# Patient Record
Sex: MALE | Race: WHITE | NOT HISPANIC OR LATINO | Employment: OTHER | URBAN - METROPOLITAN AREA
[De-identification: names, ages, dates, MRNs, and addresses within clinical notes are randomized per-mention and may not be internally consistent; named-entity substitution may affect disease eponyms.]

---

## 2017-01-05 ENCOUNTER — COMMUNICATION - HEALTHEAST (OUTPATIENT)
Dept: INTERNAL MEDICINE | Facility: CLINIC | Age: 77
End: 2017-01-05

## 2017-01-27 ENCOUNTER — RECORDS - HEALTHEAST (OUTPATIENT)
Dept: ADMINISTRATIVE | Facility: OTHER | Age: 77
End: 2017-01-27

## 2017-04-18 ENCOUNTER — COMMUNICATION - HEALTHEAST (OUTPATIENT)
Dept: INTERNAL MEDICINE | Facility: CLINIC | Age: 77
End: 2017-04-18

## 2017-04-25 ENCOUNTER — OFFICE VISIT - HEALTHEAST (OUTPATIENT)
Dept: INTERNAL MEDICINE | Facility: CLINIC | Age: 77
End: 2017-04-25

## 2017-04-25 DIAGNOSIS — B19.10 HEPATITIS B: ICD-10-CM

## 2017-04-25 DIAGNOSIS — J20.9 ACUTE BRONCHITIS: ICD-10-CM

## 2017-04-25 LAB — HBV SURFACE AG SERPL QL IA: NEGATIVE

## 2017-04-25 ASSESSMENT — MIFFLIN-ST. JEOR: SCORE: 1635.25

## 2017-04-26 LAB — HBV CORE AB SERPL QL IA: POSITIVE

## 2017-05-05 ENCOUNTER — RECORDS - HEALTHEAST (OUTPATIENT)
Dept: ADMINISTRATIVE | Facility: OTHER | Age: 77
End: 2017-05-05

## 2017-05-15 ENCOUNTER — RECORDS - HEALTHEAST (OUTPATIENT)
Dept: ADMINISTRATIVE | Facility: OTHER | Age: 77
End: 2017-05-15

## 2017-06-05 ENCOUNTER — RECORDS - HEALTHEAST (OUTPATIENT)
Dept: ADMINISTRATIVE | Facility: OTHER | Age: 77
End: 2017-06-05

## 2017-06-07 ENCOUNTER — OFFICE VISIT - HEALTHEAST (OUTPATIENT)
Dept: INTERNAL MEDICINE | Facility: CLINIC | Age: 77
End: 2017-06-07

## 2017-06-07 ENCOUNTER — AMBULATORY - HEALTHEAST (OUTPATIENT)
Dept: INTERNAL MEDICINE | Facility: CLINIC | Age: 77
End: 2017-06-07
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
## 2017-06-07 ASSESSMENT — MIFFLIN-ST. JEOR: SCORE: 1608.04

## 2017-06-12 ENCOUNTER — COMMUNICATION - HEALTHEAST (OUTPATIENT)
Dept: INTERNAL MEDICINE | Facility: CLINIC | Age: 77
End: 2017-06-12

## 2017-06-21 ENCOUNTER — AMBULATORY - HEALTHEAST (OUTPATIENT)
Dept: LAB | Facility: CLINIC | Age: 77
End: 2017-06-21
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
## 2017-06-22 ENCOUNTER — COMMUNICATION - HEALTHEAST (OUTPATIENT)
Dept: INTERNAL MEDICINE | Facility: CLINIC | Age: 77
End: 2017-06-22

## 2017-06-26 ENCOUNTER — RECORDS - HEALTHEAST (OUTPATIENT)
Dept: ADMINISTRATIVE | Facility: OTHER | Age: 77
End: 2017-06-26

## 2017-07-10 ENCOUNTER — COMMUNICATION - HEALTHEAST (OUTPATIENT)
Dept: INTERNAL MEDICINE | Facility: CLINIC | Age: 77
End: 2017-07-10

## 2017-07-17 ENCOUNTER — RECORDS - HEALTHEAST (OUTPATIENT)
Dept: ADMINISTRATIVE | Facility: OTHER | Age: 77
End: 2017-07-17

## 2017-07-29 ENCOUNTER — RECORDS - HEALTHEAST (OUTPATIENT)
Dept: ADMINISTRATIVE | Facility: OTHER | Age: 77
End: 2017-07-29

## 2017-10-02 ENCOUNTER — RECORDS - HEALTHEAST (OUTPATIENT)
Dept: ADMINISTRATIVE | Facility: OTHER | Age: 77
End: 2017-10-02

## 2017-10-03 ENCOUNTER — RECORDS - HEALTHEAST (OUTPATIENT)
Dept: ADMINISTRATIVE | Facility: OTHER | Age: 77
End: 2017-10-03

## 2017-10-25 ENCOUNTER — COMMUNICATION - HEALTHEAST (OUTPATIENT)
Dept: INTERNAL MEDICINE | Facility: CLINIC | Age: 77
End: 2017-10-25

## 2017-10-25 DIAGNOSIS — E78.2 MIXED HYPERLIPIDEMIA: ICD-10-CM

## 2017-11-07 ENCOUNTER — COMMUNICATION - HEALTHEAST (OUTPATIENT)
Dept: INTERNAL MEDICINE | Facility: CLINIC | Age: 77
End: 2017-11-07

## 2017-11-07 ENCOUNTER — RECORDS - HEALTHEAST (OUTPATIENT)
Dept: ADMINISTRATIVE | Facility: OTHER | Age: 77
End: 2017-11-07

## 2017-11-08 ENCOUNTER — OFFICE VISIT - HEALTHEAST (OUTPATIENT)
Dept: INTERNAL MEDICINE | Facility: CLINIC | Age: 77
End: 2017-11-08

## 2017-11-08 DIAGNOSIS — I10 ESSENTIAL HYPERTENSION: ICD-10-CM

## 2017-11-08 ASSESSMENT — MIFFLIN-ST. JEOR: SCORE: 1608.04

## 2017-11-13 ENCOUNTER — OFFICE VISIT - HEALTHEAST (OUTPATIENT)
Dept: INTERNAL MEDICINE | Facility: CLINIC | Age: 77
End: 2017-11-13

## 2017-11-13 DIAGNOSIS — I10 ESSENTIAL HYPERTENSION: ICD-10-CM

## 2017-11-13 ASSESSMENT — MIFFLIN-ST. JEOR
SCORE: 1603.5
SCORE: 1603.5

## 2017-12-13 ENCOUNTER — OFFICE VISIT - HEALTHEAST (OUTPATIENT)
Dept: INTERNAL MEDICINE | Facility: CLINIC | Age: 77
End: 2017-12-13

## 2017-12-13 DIAGNOSIS — E78.2 MIXED HYPERLIPIDEMIA: ICD-10-CM

## 2017-12-13 DIAGNOSIS — I10 ESSENTIAL HYPERTENSION: ICD-10-CM

## 2017-12-13 ASSESSMENT — MIFFLIN-ST. JEOR: SCORE: 1621.65

## 2017-12-20 ENCOUNTER — RECORDS - HEALTHEAST (OUTPATIENT)
Dept: ADMINISTRATIVE | Facility: OTHER | Age: 77
End: 2017-12-20

## 2017-12-23 ENCOUNTER — COMMUNICATION - HEALTHEAST (OUTPATIENT)
Dept: INTERNAL MEDICINE | Facility: CLINIC | Age: 77
End: 2017-12-23

## 2017-12-23 DIAGNOSIS — K21.9 GERD (GASTROESOPHAGEAL REFLUX DISEASE): ICD-10-CM

## 2018-01-03 ENCOUNTER — OFFICE VISIT - HEALTHEAST (OUTPATIENT)
Dept: INTERNAL MEDICINE | Facility: CLINIC | Age: 78
End: 2018-01-03

## 2018-01-03 DIAGNOSIS — E78.2 MIXED HYPERLIPIDEMIA: ICD-10-CM

## 2018-01-03 DIAGNOSIS — I10 ESSENTIAL HYPERTENSION: ICD-10-CM

## 2018-01-03 ASSESSMENT — MIFFLIN-ST. JEOR: SCORE: 1617.11

## 2018-01-15 ENCOUNTER — COMMUNICATION - HEALTHEAST (OUTPATIENT)
Dept: INTERNAL MEDICINE | Facility: CLINIC | Age: 78
End: 2018-01-15

## 2018-01-15 ENCOUNTER — AMBULATORY - HEALTHEAST (OUTPATIENT)
Dept: INTERNAL MEDICINE | Facility: CLINIC | Age: 78
End: 2018-01-15

## 2018-01-15 ENCOUNTER — OFFICE VISIT - HEALTHEAST (OUTPATIENT)
Dept: INTERNAL MEDICINE | Facility: CLINIC | Age: 78
End: 2018-01-15

## 2018-01-15 DIAGNOSIS — R63.5 WEIGHT GAIN: ICD-10-CM

## 2018-01-15 DIAGNOSIS — E78.2 MIXED HYPERLIPIDEMIA: ICD-10-CM

## 2018-01-15 DIAGNOSIS — I10 ESSENTIAL HYPERTENSION: ICD-10-CM

## 2018-01-15 DIAGNOSIS — G31.84 MILD COGNITIVE IMPAIRMENT: ICD-10-CM

## 2018-01-15 DIAGNOSIS — Z12.5 PROSTATE CANCER SCREENING: ICD-10-CM

## 2018-01-15 DIAGNOSIS — Z00.00 ROUTINE GENERAL MEDICAL EXAMINATION AT A HEALTH CARE FACILITY: ICD-10-CM

## 2018-01-15 DIAGNOSIS — C76.0 HEAD AND NECK CANCER (H): ICD-10-CM

## 2018-01-15 DIAGNOSIS — Z79.899 MEDICATION MANAGEMENT: ICD-10-CM

## 2018-01-15 DIAGNOSIS — D72.819 LEUKOPENIA: ICD-10-CM

## 2018-01-15 LAB
ALBUMIN SERPL-MCNC: 3.9 G/DL (ref 3.5–5)
ALBUMIN UR-MCNC: NEGATIVE MG/DL
ALP SERPL-CCNC: 67 U/L (ref 45–120)
ALT SERPL W P-5'-P-CCNC: 29 U/L (ref 0–45)
ANION GAP SERPL CALCULATED.3IONS-SCNC: 12 MMOL/L (ref 5–18)
APPEARANCE UR: CLEAR
AST SERPL W P-5'-P-CCNC: 22 U/L (ref 0–40)
ATRIAL RATE - MUSE: 72 BPM
BASOPHILS # BLD AUTO: 0 THOU/UL (ref 0–0.2)
BASOPHILS NFR BLD AUTO: 1 % (ref 0–2)
BILIRUB SERPL-MCNC: 0.7 MG/DL (ref 0–1)
BILIRUB UR QL STRIP: NEGATIVE
BUN SERPL-MCNC: 15 MG/DL (ref 8–28)
CALCIUM SERPL-MCNC: 9.4 MG/DL (ref 8.5–10.5)
CHLORIDE BLD-SCNC: 107 MMOL/L (ref 98–107)
CHOLEST SERPL-MCNC: 181 MG/DL
CO2 SERPL-SCNC: 24 MMOL/L (ref 22–31)
COLOR UR AUTO: YELLOW
CREAT SERPL-MCNC: 1.01 MG/DL (ref 0.7–1.3)
DIASTOLIC BLOOD PRESSURE - MUSE: NORMAL MMHG
EOSINOPHIL # BLD AUTO: 0.1 THOU/UL (ref 0–0.4)
EOSINOPHIL NFR BLD AUTO: 2 % (ref 0–6)
ERYTHROCYTE [DISTWIDTH] IN BLOOD BY AUTOMATED COUNT: 13.4 % (ref 11–14.5)
FASTING STATUS PATIENT QL REPORTED: YES
GFR SERPL CREATININE-BSD FRML MDRD: >60 ML/MIN/1.73M2
GLUCOSE BLD-MCNC: 99 MG/DL (ref 70–125)
GLUCOSE UR STRIP-MCNC: NEGATIVE MG/DL
HCT VFR BLD AUTO: 48.7 % (ref 40–54)
HDLC SERPL-MCNC: 53 MG/DL
HGB BLD-MCNC: 15.7 G/DL (ref 14–18)
HGB UR QL STRIP: NEGATIVE
INTERPRETATION ECG - MUSE: NORMAL
KETONES UR STRIP-MCNC: NEGATIVE MG/DL
LDLC SERPL CALC-MCNC: 96 MG/DL
LEUKOCYTE ESTERASE UR QL STRIP: NEGATIVE
LYMPHOCYTES # BLD AUTO: 0.5 THOU/UL (ref 0.8–4.4)
LYMPHOCYTES NFR BLD AUTO: 16 % (ref 20–40)
MCH RBC QN AUTO: 31.1 PG (ref 27–34)
MCHC RBC AUTO-ENTMCNC: 32.2 G/DL (ref 32–36)
MCV RBC AUTO: 96 FL (ref 80–100)
MONOCYTES # BLD AUTO: 0.3 THOU/UL (ref 0–0.9)
MONOCYTES NFR BLD AUTO: 10 % (ref 2–10)
NEUTROPHILS # BLD AUTO: 2.3 THOU/UL (ref 2–7.7)
NEUTROPHILS NFR BLD AUTO: 70 % (ref 50–70)
NITRATE UR QL: NEGATIVE
P AXIS - MUSE: 33 DEGREES
PH UR STRIP: 5.5 [PH] (ref 5–8)
PLATELET # BLD AUTO: 130 THOU/UL (ref 140–440)
PMV BLD AUTO: 11.2 FL (ref 8.5–12.5)
POTASSIUM BLD-SCNC: 4.3 MMOL/L (ref 3.5–5)
PR INTERVAL - MUSE: 206 MS
PROT SERPL-MCNC: 7 G/DL (ref 6–8)
PSA SERPL-MCNC: 1.4 NG/ML (ref 0–6.5)
QRS DURATION - MUSE: 84 MS
QT - MUSE: 386 MS
QTC - MUSE: 422 MS
R AXIS - MUSE: -25 DEGREES
RBC # BLD AUTO: 5.05 MILL/UL (ref 4.4–6.2)
SODIUM SERPL-SCNC: 143 MMOL/L (ref 136–145)
SP GR UR STRIP: 1.02 (ref 1–1.03)
SYSTOLIC BLOOD PRESSURE - MUSE: NORMAL MMHG
T AXIS - MUSE: 14 DEGREES
T4 FREE SERPL-MCNC: 0.7 NG/DL (ref 0.7–1.8)
TRIGL SERPL-MCNC: 160 MG/DL
TSH SERPL DL<=0.005 MIU/L-ACNC: 9.24 UIU/ML (ref 0.3–5)
UROBILINOGEN UR STRIP-ACNC: NORMAL
VENTRICULAR RATE- MUSE: 72 BPM
WBC: 3.3 THOU/UL (ref 4–11)

## 2018-01-15 ASSESSMENT — MIFFLIN-ST. JEOR: SCORE: 1612.57

## 2018-01-22 ENCOUNTER — RECORDS - HEALTHEAST (OUTPATIENT)
Dept: ADMINISTRATIVE | Facility: OTHER | Age: 78
End: 2018-01-22

## 2018-04-11 ENCOUNTER — COMMUNICATION - HEALTHEAST (OUTPATIENT)
Dept: INTERNAL MEDICINE | Facility: CLINIC | Age: 78
End: 2018-04-11

## 2018-04-11 ENCOUNTER — OFFICE VISIT - HEALTHEAST (OUTPATIENT)
Dept: INTERNAL MEDICINE | Facility: CLINIC | Age: 78
End: 2018-04-11

## 2018-04-11 DIAGNOSIS — D72.819 LEUKOPENIA, UNSPECIFIED TYPE: ICD-10-CM

## 2018-04-11 DIAGNOSIS — L72.0 EPIDERMAL CYST: ICD-10-CM

## 2018-04-11 DIAGNOSIS — I10 ESSENTIAL HYPERTENSION: ICD-10-CM

## 2018-04-11 DIAGNOSIS — E03.4 HYPOTHYROIDISM DUE TO ACQUIRED ATROPHY OF THYROID: ICD-10-CM

## 2018-04-11 LAB
BASOPHILS # BLD AUTO: 0 THOU/UL (ref 0–0.2)
BASOPHILS NFR BLD AUTO: 1 % (ref 0–2)
EOSINOPHIL # BLD AUTO: 0.1 THOU/UL (ref 0–0.4)
EOSINOPHIL NFR BLD AUTO: 3 % (ref 0–6)
ERYTHROCYTE [DISTWIDTH] IN BLOOD BY AUTOMATED COUNT: 14.2 % (ref 11–14.5)
HCT VFR BLD AUTO: 45.1 % (ref 40–54)
HGB BLD-MCNC: 15 G/DL (ref 14–18)
LYMPHOCYTES # BLD AUTO: 0.7 THOU/UL (ref 0.8–4.4)
LYMPHOCYTES NFR BLD AUTO: 22 % (ref 20–40)
MCH RBC QN AUTO: 32 PG (ref 27–34)
MCHC RBC AUTO-ENTMCNC: 33.3 G/DL (ref 32–36)
MCV RBC AUTO: 96 FL (ref 80–100)
MONOCYTES # BLD AUTO: 0.3 THOU/UL (ref 0–0.9)
MONOCYTES NFR BLD AUTO: 9 % (ref 2–10)
NEUTROPHILS # BLD AUTO: 2.2 THOU/UL (ref 2–7.7)
NEUTROPHILS NFR BLD AUTO: 66 % (ref 50–70)
PLATELET # BLD AUTO: 132 THOU/UL (ref 140–440)
PMV BLD AUTO: 11.6 FL (ref 8.5–12.5)
RBC # BLD AUTO: 4.69 MILL/UL (ref 4.4–6.2)
TSH SERPL DL<=0.005 MIU/L-ACNC: 3.12 UIU/ML (ref 0.3–5)
WBC: 3.3 THOU/UL (ref 4–11)

## 2018-04-11 ASSESSMENT — MIFFLIN-ST. JEOR: SCORE: 1617.11

## 2018-05-02 ENCOUNTER — RECORDS - HEALTHEAST (OUTPATIENT)
Dept: ADMINISTRATIVE | Facility: OTHER | Age: 78
End: 2018-05-02

## 2018-05-08 ENCOUNTER — RECORDS - HEALTHEAST (OUTPATIENT)
Dept: ADMINISTRATIVE | Facility: OTHER | Age: 78
End: 2018-05-08

## 2018-05-23 ENCOUNTER — RECORDS - HEALTHEAST (OUTPATIENT)
Dept: ADMINISTRATIVE | Facility: OTHER | Age: 78
End: 2018-05-23

## 2018-07-08 ENCOUNTER — COMMUNICATION - HEALTHEAST (OUTPATIENT)
Dept: INTERNAL MEDICINE | Facility: CLINIC | Age: 78
End: 2018-07-08

## 2018-07-08 DIAGNOSIS — E03.4 HYPOTHYROIDISM DUE TO ACQUIRED ATROPHY OF THYROID: ICD-10-CM

## 2018-08-07 ENCOUNTER — RECORDS - HEALTHEAST (OUTPATIENT)
Dept: ADMINISTRATIVE | Facility: OTHER | Age: 78
End: 2018-08-07

## 2018-08-08 ENCOUNTER — RECORDS - HEALTHEAST (OUTPATIENT)
Dept: ADMINISTRATIVE | Facility: OTHER | Age: 78
End: 2018-08-08

## 2018-08-15 ENCOUNTER — OFFICE VISIT - HEALTHEAST (OUTPATIENT)
Dept: INTERNAL MEDICINE | Facility: CLINIC | Age: 78
End: 2018-08-15

## 2018-08-15 ENCOUNTER — COMMUNICATION - HEALTHEAST (OUTPATIENT)
Dept: INTERNAL MEDICINE | Facility: CLINIC | Age: 78
End: 2018-08-15

## 2018-08-15 DIAGNOSIS — E78.2 MIXED HYPERLIPIDEMIA: ICD-10-CM

## 2018-08-15 DIAGNOSIS — I10 ESSENTIAL HYPERTENSION: ICD-10-CM

## 2018-08-15 DIAGNOSIS — E03.4 HYPOTHYROIDISM DUE TO ACQUIRED ATROPHY OF THYROID: ICD-10-CM

## 2018-08-15 LAB — TSH SERPL DL<=0.005 MIU/L-ACNC: 3.43 UIU/ML (ref 0.3–5)

## 2018-08-15 ASSESSMENT — MIFFLIN-ST. JEOR: SCORE: 1603.5

## 2018-08-30 ENCOUNTER — RECORDS - HEALTHEAST (OUTPATIENT)
Dept: ADMINISTRATIVE | Facility: OTHER | Age: 78
End: 2018-08-30

## 2018-09-17 ENCOUNTER — COMMUNICATION - HEALTHEAST (OUTPATIENT)
Dept: INTERNAL MEDICINE | Facility: CLINIC | Age: 78
End: 2018-09-17

## 2018-09-18 ENCOUNTER — AMBULATORY - HEALTHEAST (OUTPATIENT)
Dept: INTERNAL MEDICINE | Facility: CLINIC | Age: 78
End: 2018-09-18

## 2018-09-26 ENCOUNTER — RECORDS - HEALTHEAST (OUTPATIENT)
Dept: ADMINISTRATIVE | Facility: OTHER | Age: 78
End: 2018-09-26

## 2018-10-17 ENCOUNTER — COMMUNICATION - HEALTHEAST (OUTPATIENT)
Dept: INTERNAL MEDICINE | Facility: CLINIC | Age: 78
End: 2018-10-17

## 2018-10-18 ENCOUNTER — RECORDS - HEALTHEAST (OUTPATIENT)
Dept: ADMINISTRATIVE | Facility: OTHER | Age: 78
End: 2018-10-18

## 2018-10-19 ENCOUNTER — OFFICE VISIT - HEALTHEAST (OUTPATIENT)
Dept: INTERNAL MEDICINE | Facility: CLINIC | Age: 78
End: 2018-10-19

## 2018-10-19 ENCOUNTER — COMMUNICATION - HEALTHEAST (OUTPATIENT)
Dept: INTERNAL MEDICINE | Facility: CLINIC | Age: 78
End: 2018-10-19

## 2018-10-19 DIAGNOSIS — I82.409 DVT (DEEP VENOUS THROMBOSIS) (H): ICD-10-CM

## 2018-10-19 ASSESSMENT — MIFFLIN-ST. JEOR: SCORE: 1644.33

## 2018-10-23 ENCOUNTER — RECORDS - HEALTHEAST (OUTPATIENT)
Dept: ADMINISTRATIVE | Facility: OTHER | Age: 78
End: 2018-10-23

## 2018-10-28 ENCOUNTER — RECORDS - HEALTHEAST (OUTPATIENT)
Dept: ADMINISTRATIVE | Facility: OTHER | Age: 78
End: 2018-10-28

## 2018-10-30 ENCOUNTER — COMMUNICATION - HEALTHEAST (OUTPATIENT)
Dept: INTERNAL MEDICINE | Facility: CLINIC | Age: 78
End: 2018-10-30

## 2018-11-09 ENCOUNTER — OFFICE VISIT - HEALTHEAST (OUTPATIENT)
Dept: INTERNAL MEDICINE | Facility: CLINIC | Age: 78
End: 2018-11-09

## 2018-11-09 DIAGNOSIS — Z86.72 HISTORY OF DEEP VEIN THROMBOPHLEBITIS OF LOWER EXTREMITY: ICD-10-CM

## 2018-11-09 DIAGNOSIS — E78.2 MIXED HYPERLIPIDEMIA: ICD-10-CM

## 2018-11-09 DIAGNOSIS — I10 ESSENTIAL HYPERTENSION: ICD-10-CM

## 2018-11-09 DIAGNOSIS — E03.4 HYPOTHYROIDISM DUE TO ACQUIRED ATROPHY OF THYROID: ICD-10-CM

## 2018-11-09 DIAGNOSIS — K21.9 GASTROESOPHAGEAL REFLUX DISEASE WITHOUT ESOPHAGITIS: ICD-10-CM

## 2018-11-09 ASSESSMENT — MIFFLIN-ST. JEOR: SCORE: 1626.18

## 2018-11-21 ENCOUNTER — RECORDS - HEALTHEAST (OUTPATIENT)
Dept: ADMINISTRATIVE | Facility: OTHER | Age: 78
End: 2018-11-21

## 2018-11-29 ENCOUNTER — RECORDS - HEALTHEAST (OUTPATIENT)
Dept: ADMINISTRATIVE | Facility: OTHER | Age: 78
End: 2018-11-29

## 2018-12-05 ENCOUNTER — OFFICE VISIT - HEALTHEAST (OUTPATIENT)
Dept: INTERNAL MEDICINE | Facility: CLINIC | Age: 78
End: 2018-12-05

## 2018-12-05 DIAGNOSIS — I10 ESSENTIAL HYPERTENSION: ICD-10-CM

## 2018-12-05 DIAGNOSIS — Z86.72 HISTORY OF DEEP VEIN THROMBOPHLEBITIS OF LOWER EXTREMITY: ICD-10-CM

## 2018-12-05 ASSESSMENT — MIFFLIN-ST. JEOR: SCORE: 1639.79

## 2018-12-12 ENCOUNTER — RECORDS - HEALTHEAST (OUTPATIENT)
Dept: ADMINISTRATIVE | Facility: OTHER | Age: 78
End: 2018-12-12

## 2018-12-17 ENCOUNTER — PATIENT OUTREACH (OUTPATIENT)
Dept: CARE COORDINATION | Facility: CLINIC | Age: 78
End: 2018-12-17

## 2018-12-17 NOTE — LETTER
[place] CARE COORDINATION  [facility] [address]  [facility] [address]    December 18, 2018    Michael Coombs Joel  [address] [facility] [address]      Dear Michael,    I am a clinic care coordinator who works with Memo Martinez MD at [phone]. I wanted to thank you for spending the time to talk with me.  I wanted to introduce myself and provide you with my contact information so that you can call me with questions or concerns about your health care. Below is a description of clinic care coordination and how I can further assist you.     The clinic care coordinator is a registered nurse and/or  who understand the health care system. The goal of clinic care coordination is to help you manage your health and improve access to the [facility] system in the most efficient manner. The registered nurse can assist you in meeting your health care goals by providing education, coordinating services, and strengthening the communication among your providers. The  can assist you with financial, behavioral, psychosocial, chemical dependency, counseling, and/or psychiatric resources.    Please feel free to contact me at [phone], with any questions or concerns. We at [facility] are focused on providing you with the highest-quality healthcare experience possible and that all starts with you.     Sincerely,     Safia Wong

## 2018-12-18 ENCOUNTER — COMMUNICATION - HEALTHEAST (OUTPATIENT)
Dept: SCHEDULING | Facility: CLINIC | Age: 78
End: 2018-12-18

## 2018-12-18 ASSESSMENT — ACTIVITIES OF DAILY LIVING (ADL): DEPENDENT_IADLS:: INDEPENDENT

## 2018-12-18 NOTE — PROGRESS NOTES
Clinic Care Coordination Contact    Clinic Care Coordination Contact  OUTREACH    Referral Information:  Referral Source: ED Follow-Up   Afebrile.  Vital signs here unremarkable.  Patient is presenting with rash and itching.  This been ongoing for the last several days.  He did visit his dermatologist who told him that he has dermatitis in his leg and chest.  They did not give him any treatment however at home he had triamcinolone cream and he called them and they said to for him to use it.  Physical exam for him here he has erythematous, swollen right lower extremity.  He is known right DVT for which she is on Xarelto which has cleared since previous ultrasounds in this leg.  He is scheduled for a knee replacement.  He has erythematous flat maculopapular rash diffusely on his leg but primarily on the lateral aspect of his knee.  He has excoriations up and down his leg.  His skin appears very dry.  He has not been using any lotions or creams.  No oral medications.  His rash looks to be a dermatitis likely exacerbated by cold.  Discussed with him emollient cream usage, cold showers, pat dry, and Benadryl.  We will give him a dose of oral steroids here to see if this does help with his itching.  We will give Benadryl.  Of note patient's lower extremity is quite erythematous.  He has a known history of phlebitis in this leg in the past.  He did state that his leg is slightly more swollen this could be secondary to his scratching however given his history we will get an ultrasound to make sure he has no DVT.  In terms of her for his rash it is present on his abdomen and chest as well.  Again this rash does seem likely consistent with dermatitis.  Will reevaluate after treatments.  Was given lotions and creams in the emergency department here to try and use.  [JH]             Primary Diagnosis: Other(rash)    Chief Complaint   Patient presents with     Clinic Care Coordination - Post Hospital     [facility]        Universal Utilization: Appropriate.    Clinical Concerns:  Current Medical Concerns:  Has scheduled knee replacement in January.  Fell while mowing his lawn at his cabin this fall and developed a blood clot.  He had to cancel his scheduled knee replacement after that. They also had to cancel a trip to [place] they had planned.  His rash has improved each day. He completed the prednisone.  He took a shower and is not using a wash cloth.  Thinks he will be ok.     Current Behavioral Concerns: None noted.     Education Provided to patient: Reviewed role of care coordinator.   Pain  Pain (GOAL):: No  Health Maintenance Reviewed: Due/Overdue   Clinical Pathway: None    Medication Management:  Independent and no concerns.     Functional Status:  Dependent ADLs:: Independent, Ambulation-no assistive device  Dependent IADLs:: Independent  Bed or wheelchair confined:: No  Mobility Status: Independent  Fallen 2 or more times in the past year?: No  Any fall with injury in the past year?: Yes    Living Situation:  Current living arrangement:: I live in a private home with spouse  Type of residence:: [facility]    Diet/Exercise/Sleep:  Diet:: Regular  Inadequate nutrition (GOAL):: No  Food Insecurity: No  Tube Feeding: No  Exercise:: Currently not exercising. He and his wife used to go to the  in [place] to walk, but with his knee pain, he has not done it for a while.  He may try using a treadmill in their complex.    Inadequate activity/exercise (GOAL):: No  Significant changes in sleep pattern (GOAL): No    Transportation:  Transportation concerns (GOAL):: No  Transportation means:: Regular car     Psychosocial:  Mental health DX:: No  Mental health management concern (GOAL):: No  Informal Support system:: Spouse     Financial/Insurance:   Financial/Insurance concerns (GOAL):: No  UCARE for seniors, no financial concerns.      Resources and Interventions:  Current Resources:    ;   Community Resources:  None  Supplies used at home:: None  Equipment Currently Used at Home: none. He does have crutches from his previous knee replacement.     Advance Care Plan/Directive  Advanced Care Plans/Directives on file:: Yes  Type Advanced Care Plans/Directives: Advanced Directive - On File  Advanced Care Plan/Directive Status: Declined Further Information    Referrals Placed: None     Patient/Caregiver understanding: He hopes that he will discharge to home after knee replacement and not have to go to TCU.  No need for ongoing care coordination.  He appreciated the call.      Plan: Will send letter and no further outreach.    Safia Wong,   [facility]  [email or web address]  [phone]

## 2018-12-31 ENCOUNTER — OFFICE VISIT - HEALTHEAST (OUTPATIENT)
Dept: INTERNAL MEDICINE | Facility: CLINIC | Age: 78
End: 2018-12-31

## 2018-12-31 DIAGNOSIS — Z86.72 HISTORY OF DEEP VEIN THROMBOPHLEBITIS OF LOWER EXTREMITY: ICD-10-CM

## 2018-12-31 DIAGNOSIS — M17.11 PRIMARY OSTEOARTHRITIS OF RIGHT KNEE: ICD-10-CM

## 2018-12-31 DIAGNOSIS — I10 ESSENTIAL HYPERTENSION: ICD-10-CM

## 2018-12-31 DIAGNOSIS — Z01.818 PREOP GENERAL PHYSICAL EXAM: ICD-10-CM

## 2018-12-31 LAB
ANION GAP SERPL CALCULATED.3IONS-SCNC: 8 MMOL/L (ref 5–18)
ATRIAL RATE - MUSE: 66 BPM
BUN SERPL-MCNC: 15 MG/DL (ref 8–28)
CALCIUM SERPL-MCNC: 9.7 MG/DL (ref 8.5–10.5)
CHLORIDE BLD-SCNC: 105 MMOL/L (ref 98–107)
CO2 SERPL-SCNC: 27 MMOL/L (ref 22–31)
CREAT SERPL-MCNC: 1.16 MG/DL (ref 0.7–1.3)
DIASTOLIC BLOOD PRESSURE - MUSE: NORMAL MMHG
ERYTHROCYTE [DISTWIDTH] IN BLOOD BY AUTOMATED COUNT: 11.7 % (ref 11–14.5)
GFR SERPL CREATININE-BSD FRML MDRD: >60 ML/MIN/1.73M2
GLUCOSE BLD-MCNC: 99 MG/DL (ref 70–125)
HCT VFR BLD AUTO: 44.5 % (ref 40–54)
HGB BLD-MCNC: 14.9 G/DL (ref 14–18)
INTERPRETATION ECG - MUSE: NORMAL
MCH RBC QN AUTO: 30.7 PG (ref 27–34)
MCHC RBC AUTO-ENTMCNC: 33.6 G/DL (ref 32–36)
MCV RBC AUTO: 91 FL (ref 80–100)
P AXIS - MUSE: 39 DEGREES
PLATELET # BLD AUTO: 123 THOU/UL (ref 140–440)
PMV BLD AUTO: 8.6 FL (ref 7–10)
POTASSIUM BLD-SCNC: 4 MMOL/L (ref 3.5–5)
PR INTERVAL - MUSE: 218 MS
QRS DURATION - MUSE: 76 MS
QT - MUSE: 384 MS
QTC - MUSE: 402 MS
R AXIS - MUSE: -16 DEGREES
RBC # BLD AUTO: 4.87 MILL/UL (ref 4.4–6.2)
SODIUM SERPL-SCNC: 140 MMOL/L (ref 136–145)
SYSTOLIC BLOOD PRESSURE - MUSE: NORMAL MMHG
T AXIS - MUSE: 15 DEGREES
VENTRICULAR RATE- MUSE: 66 BPM
WBC: 4 THOU/UL (ref 4–11)

## 2018-12-31 ASSESSMENT — MIFFLIN-ST. JEOR: SCORE: 1655.66

## 2019-01-07 ENCOUNTER — SURGERY - HEALTHEAST (OUTPATIENT)
Dept: SURGERY | Facility: CLINIC | Age: 79
End: 2019-01-07

## 2019-01-07 ENCOUNTER — ANESTHESIA - HEALTHEAST (OUTPATIENT)
Dept: SURGERY | Facility: CLINIC | Age: 79
End: 2019-01-07

## 2019-01-11 ENCOUNTER — COMMUNICATION - HEALTHEAST (OUTPATIENT)
Dept: SCHEDULING | Facility: CLINIC | Age: 79
End: 2019-01-11

## 2019-01-11 ENCOUNTER — PATIENT OUTREACH (OUTPATIENT)
Dept: CARE COORDINATION | Facility: CLINIC | Age: 79
End: 2019-01-11

## 2019-01-11 ASSESSMENT — ACTIVITIES OF DAILY LIVING (ADL): DEPENDENT_IADLS:: TRANSPORTATION;CLEANING;COOKING;LAUNDRY;SHOPPING;MEAL PREPARATION;MEDICATION MANAGEMENT

## 2019-01-11 NOTE — PROGRESS NOTES
Clinic Care Coordination Contact    Clinic Care Coordination Contact  OUTREACH    Referral Information:  Referral Source: IP Report    Primary Diagnosis: Orthopedic    Chief Complaint   Patient presents with     Clinic Care Coordination - Post Hospital     DCd from [facility][1] on 1/10/19      From chart review:  Admission Date: 1/7/2019  Admission Diagnoses: OA (osteoarthritis) of knee [M17.10]   Discharge Date:  1/10/19  Post-operative Day:  3 Days Post-Op  Reason for Admission: The patient was admitted for the following:  RIGHT TOTAL KNEE  ARTHROPLASTY (Right)     Brief Hospital Course: This 78 y.o. male underwent the aforementioned procedure with Dr. Page on 1/7/19. There were no intraoperative complications and the patient was transferred to the recovery room and later the orthopedic unit in stable condition. Once the patient reached the orthopedic floor our orthopedic pain protocol was implemented along with the following:  Therapy: Physical Therapy and Occupational Therapy  Anticoagulation Medications: Xarelto      Clinical Concerns:  Current Medical Concerns: Called patient and he handed phone to spouse. He had outpatient therapy this morning and the therapist looked at his knee and no concerns. His pain is under control. Wife is giving pain medication.  This is the second knee replacement he has had, so they know what to expect. Has therapy sessions set up until March. Has OV with PCP and surgeon already scheduled.  He had a dizzy spell when he was up for breakfast, but it resolved and he ate some yogurt before therapy.  She will watch for constipation.        Current Behavioral Concerns: none identified.     Education Provided to patient: none provided  Pain  Pain (GOAL):: No  Health Maintenance Reviewed: Due/Overdue       Medication Management:  Wife is administering. No concerns.      Functional Status:  Dependent ADLs:: Independent, Ambulation-walker  Dependent IADLs:: Transportation, Cleaning,  Cooking, Laundry, Shopping, Meal Preparation, Medication Management  Bed or wheelchair confined:: No  Mobility Status: Independent w/Device  Fallen 2 or more times in the past year?: No  Any fall with injury in the past year?: Yes    Living Situation:  Current living arrangement:: I live in a private home with spouse  Type of residence:: [facility]    Diet/Exercise/Sleep:  Diet:: Regular  Inadequate nutrition (GOAL):: No  Food Insecurity: No  Tube Feeding: No  Exercise:: Currently not exercising  Inadequate activity/exercise (GOAL):: No  Significant changes in sleep pattern (GOAL): No    Transportation:  Transportation concerns (GOAL):: No  Transportation means:: Regular car, Family     Psychosocial:  Mental health DX:: No  Mental health management concern (GOAL):: No  Informal Support system:: Spouse     Financial/Insurance:   Financial/Insurance concerns (GOAL):: No  UCARE for Seniors, no financial concerns.      Resources and Interventions:  Current Resources:    ;   Community Resources: Acute Rehab  Supplies used at home:: None  Equipment Currently Used at Home: walker, rolling    Advance Care Plan/Directive  Advanced Care Plans/Directives on file:: Yes  Type Advanced Care Plans/Directives: Advanced Directive - On File  Advanced Care Plan/Directive Status: Declined Further Information    Referrals Placed: None    Patient/Caregiver understanding: Patient and his wife have things under control. They will call [place] or PCP with any concerns.  No need for ongoing care coordination.  They received letter from CC one month ago and still have it. Will call CC if needs arise.     Plan: No further outreach by this CC.     Safia Wong,   [facility]  [email or web address]  [phone]

## 2019-01-13 ENCOUNTER — RECORDS - HEALTHEAST (OUTPATIENT)
Dept: ADMINISTRATIVE | Facility: OTHER | Age: 79
End: 2019-01-13

## 2019-01-14 ENCOUNTER — OFFICE VISIT - HEALTHEAST (OUTPATIENT)
Dept: INTERNAL MEDICINE | Facility: CLINIC | Age: 79
End: 2019-01-14

## 2019-01-14 ENCOUNTER — COMMUNICATION - HEALTHEAST (OUTPATIENT)
Dept: CARE COORDINATION | Facility: CLINIC | Age: 79
End: 2019-01-14

## 2019-01-14 DIAGNOSIS — Z96.651 STATUS POST TOTAL KNEE REPLACEMENT, RIGHT: ICD-10-CM

## 2019-01-14 DIAGNOSIS — G89.18 POSTOPERATIVE PAIN: ICD-10-CM

## 2019-01-14 DIAGNOSIS — I10 ESSENTIAL HYPERTENSION: ICD-10-CM

## 2019-01-15 ENCOUNTER — RECORDS - HEALTHEAST (OUTPATIENT)
Dept: ADMINISTRATIVE | Facility: OTHER | Age: 79
End: 2019-01-15

## 2019-01-16 ENCOUNTER — COMMUNICATION - HEALTHEAST (OUTPATIENT)
Dept: PHARMACY | Facility: CLINIC | Age: 79
End: 2019-01-16

## 2019-01-16 DIAGNOSIS — E03.4 HYPOTHYROIDISM DUE TO ACQUIRED ATROPHY OF THYROID: ICD-10-CM

## 2019-01-16 DIAGNOSIS — Z96.651 STATUS POST TOTAL KNEE REPLACEMENT, RIGHT: ICD-10-CM

## 2019-01-16 DIAGNOSIS — I10 ESSENTIAL HYPERTENSION: ICD-10-CM

## 2019-01-16 DIAGNOSIS — E78.2 MIXED HYPERLIPIDEMIA: ICD-10-CM

## 2019-01-23 ENCOUNTER — HOME CARE/HOSPICE - HEALTHEAST (OUTPATIENT)
Dept: HOME HEALTH SERVICES | Facility: HOME HEALTH | Age: 79
End: 2019-01-23

## 2019-01-26 ENCOUNTER — HOME CARE/HOSPICE - HEALTHEAST (OUTPATIENT)
Dept: HOME HEALTH SERVICES | Facility: HOME HEALTH | Age: 79
End: 2019-01-26

## 2019-01-26 ENCOUNTER — COMMUNICATION - HEALTHEAST (OUTPATIENT)
Dept: INTERNAL MEDICINE | Facility: CLINIC | Age: 79
End: 2019-01-26

## 2019-01-26 ENCOUNTER — COMMUNICATION - HEALTHEAST (OUTPATIENT)
Dept: HOME HEALTH SERVICES | Facility: HOME HEALTH | Age: 79
End: 2019-01-26

## 2019-01-27 ENCOUNTER — COMMUNICATION - HEALTHEAST (OUTPATIENT)
Dept: INTERNAL MEDICINE | Facility: CLINIC | Age: 79
End: 2019-01-27

## 2019-01-27 ENCOUNTER — HOME CARE/HOSPICE - HEALTHEAST (OUTPATIENT)
Dept: HOME HEALTH SERVICES | Facility: HOME HEALTH | Age: 79
End: 2019-01-27

## 2019-01-28 ENCOUNTER — RECORDS - HEALTHEAST (OUTPATIENT)
Dept: ADMINISTRATIVE | Facility: OTHER | Age: 79
End: 2019-01-28

## 2019-01-28 ENCOUNTER — HOME CARE/HOSPICE - HEALTHEAST (OUTPATIENT)
Dept: HOME HEALTH SERVICES | Facility: HOME HEALTH | Age: 79
End: 2019-01-28

## 2019-01-28 ENCOUNTER — OFFICE VISIT - HEALTHEAST (OUTPATIENT)
Dept: INTERNAL MEDICINE | Facility: CLINIC | Age: 79
End: 2019-01-28

## 2019-01-28 ENCOUNTER — COMMUNICATION - HEALTHEAST (OUTPATIENT)
Dept: CARE COORDINATION | Facility: CLINIC | Age: 79
End: 2019-01-28

## 2019-01-28 DIAGNOSIS — Z96.651 STATUS POST TOTAL KNEE REPLACEMENT, RIGHT: ICD-10-CM

## 2019-01-28 DIAGNOSIS — I95.2 HYPOTENSION DUE TO DRUGS: ICD-10-CM

## 2019-01-28 DIAGNOSIS — F41.9 ANXIETY: ICD-10-CM

## 2019-01-28 ASSESSMENT — MIFFLIN-ST. JEOR: SCORE: 1610.3

## 2019-01-29 ENCOUNTER — HOME CARE/HOSPICE - HEALTHEAST (OUTPATIENT)
Dept: HOME HEALTH SERVICES | Facility: HOME HEALTH | Age: 79
End: 2019-01-29

## 2019-01-30 ENCOUNTER — HOME CARE/HOSPICE - HEALTHEAST (OUTPATIENT)
Dept: HOME HEALTH SERVICES | Facility: HOME HEALTH | Age: 79
End: 2019-01-30

## 2019-02-01 ENCOUNTER — HOME CARE/HOSPICE - HEALTHEAST (OUTPATIENT)
Dept: HOME HEALTH SERVICES | Facility: HOME HEALTH | Age: 79
End: 2019-02-01

## 2019-02-04 ENCOUNTER — HOME CARE/HOSPICE - HEALTHEAST (OUTPATIENT)
Dept: HOME HEALTH SERVICES | Facility: HOME HEALTH | Age: 79
End: 2019-02-04

## 2019-02-06 ENCOUNTER — HOME CARE/HOSPICE - HEALTHEAST (OUTPATIENT)
Dept: HOME HEALTH SERVICES | Facility: HOME HEALTH | Age: 79
End: 2019-02-06

## 2019-02-08 ENCOUNTER — HOME CARE/HOSPICE - HEALTHEAST (OUTPATIENT)
Dept: HOME HEALTH SERVICES | Facility: HOME HEALTH | Age: 79
End: 2019-02-08

## 2019-02-11 ENCOUNTER — HOME CARE/HOSPICE - HEALTHEAST (OUTPATIENT)
Dept: HOME HEALTH SERVICES | Facility: HOME HEALTH | Age: 79
End: 2019-02-11

## 2019-02-11 ENCOUNTER — OFFICE VISIT - HEALTHEAST (OUTPATIENT)
Dept: INTERNAL MEDICINE | Facility: CLINIC | Age: 79
End: 2019-02-11

## 2019-02-11 DIAGNOSIS — Z96.651 STATUS POST TOTAL KNEE REPLACEMENT, RIGHT: ICD-10-CM

## 2019-02-11 DIAGNOSIS — R42 ORTHOSTATIC DIZZINESS: ICD-10-CM

## 2019-02-11 ASSESSMENT — MIFFLIN-ST. JEOR: SCORE: 1605.76

## 2019-02-13 ENCOUNTER — HOME CARE/HOSPICE - HEALTHEAST (OUTPATIENT)
Dept: HOME HEALTH SERVICES | Facility: HOME HEALTH | Age: 79
End: 2019-02-13

## 2019-02-13 ENCOUNTER — RECORDS - HEALTHEAST (OUTPATIENT)
Dept: ADMINISTRATIVE | Facility: OTHER | Age: 79
End: 2019-02-13

## 2019-02-15 ENCOUNTER — HOME CARE/HOSPICE - HEALTHEAST (OUTPATIENT)
Dept: HOME HEALTH SERVICES | Facility: HOME HEALTH | Age: 79
End: 2019-02-15

## 2019-03-11 ENCOUNTER — RECORDS - HEALTHEAST (OUTPATIENT)
Dept: ADMINISTRATIVE | Facility: OTHER | Age: 79
End: 2019-03-11

## 2019-03-11 ENCOUNTER — OFFICE VISIT - HEALTHEAST (OUTPATIENT)
Dept: INTERNAL MEDICINE | Facility: CLINIC | Age: 79
End: 2019-03-11

## 2019-03-11 DIAGNOSIS — E03.4 HYPOTHYROIDISM DUE TO ACQUIRED ATROPHY OF THYROID: ICD-10-CM

## 2019-03-11 DIAGNOSIS — I10 ESSENTIAL HYPERTENSION: ICD-10-CM

## 2019-03-11 DIAGNOSIS — Z96.651 STATUS POST TOTAL KNEE REPLACEMENT, RIGHT: ICD-10-CM

## 2019-03-11 ASSESSMENT — MIFFLIN-ST. JEOR: SCORE: 1605.76

## 2019-03-24 ENCOUNTER — COMMUNICATION - HEALTHEAST (OUTPATIENT)
Dept: INTERNAL MEDICINE | Facility: CLINIC | Age: 79
End: 2019-03-24

## 2019-03-24 DIAGNOSIS — F41.1 GAD (GENERALIZED ANXIETY DISORDER): ICD-10-CM

## 2019-04-01 ENCOUNTER — RECORDS - HEALTHEAST (OUTPATIENT)
Dept: ADMINISTRATIVE | Facility: OTHER | Age: 79
End: 2019-04-01

## 2019-04-08 ENCOUNTER — OFFICE VISIT - HEALTHEAST (OUTPATIENT)
Dept: INTERNAL MEDICINE | Facility: CLINIC | Age: 79
End: 2019-04-08

## 2019-04-08 DIAGNOSIS — I10 ESSENTIAL HYPERTENSION: ICD-10-CM

## 2019-04-08 DIAGNOSIS — R91.1 LUNG NODULE: ICD-10-CM

## 2019-04-08 DIAGNOSIS — Z96.651 STATUS POST TOTAL KNEE REPLACEMENT, RIGHT: ICD-10-CM

## 2019-04-08 ASSESSMENT — MIFFLIN-ST. JEOR: SCORE: 1614.84

## 2019-04-15 ENCOUNTER — COMMUNICATION - HEALTHEAST (OUTPATIENT)
Dept: INTERNAL MEDICINE | Facility: CLINIC | Age: 79
End: 2019-04-15

## 2019-04-15 ENCOUNTER — HOSPITAL ENCOUNTER (OUTPATIENT)
Dept: CT IMAGING | Facility: CLINIC | Age: 79
Discharge: HOME | End: 2019-04-15
Attending: INTERNAL MEDICINE

## 2019-04-15 DIAGNOSIS — R91.1 LUNG NODULE: ICD-10-CM

## 2019-05-13 ENCOUNTER — RECORDS - HEALTHEAST (OUTPATIENT)
Dept: ADMINISTRATIVE | Facility: OTHER | Age: 79
End: 2019-05-13

## 2019-05-20 ENCOUNTER — OFFICE VISIT - HEALTHEAST (OUTPATIENT)
Dept: INTERNAL MEDICINE | Facility: CLINIC | Age: 79
End: 2019-05-20

## 2019-05-20 DIAGNOSIS — Z86.72 HISTORY OF DEEP VEIN THROMBOPHLEBITIS OF LOWER EXTREMITY: ICD-10-CM

## 2019-05-20 DIAGNOSIS — I10 ESSENTIAL HYPERTENSION: ICD-10-CM

## 2019-05-20 DIAGNOSIS — Z96.651 STATUS POST TOTAL KNEE REPLACEMENT, RIGHT: ICD-10-CM

## 2019-05-20 DIAGNOSIS — I25.10 ASHD (ARTERIOSCLEROTIC HEART DISEASE): ICD-10-CM

## 2019-05-20 ASSESSMENT — MIFFLIN-ST. JEOR: SCORE: 1632.98

## 2019-06-24 ENCOUNTER — COMMUNICATION - HEALTHEAST (OUTPATIENT)
Dept: INTERNAL MEDICINE | Facility: CLINIC | Age: 79
End: 2019-06-24

## 2019-07-01 ENCOUNTER — COMMUNICATION - HEALTHEAST (OUTPATIENT)
Dept: INTERNAL MEDICINE | Facility: CLINIC | Age: 79
End: 2019-07-01

## 2019-07-01 ENCOUNTER — OFFICE VISIT - HEALTHEAST (OUTPATIENT)
Dept: INTERNAL MEDICINE | Facility: CLINIC | Age: 79
End: 2019-07-01

## 2019-07-01 DIAGNOSIS — E78.2 MIXED HYPERLIPIDEMIA: ICD-10-CM

## 2019-07-01 DIAGNOSIS — Z86.72 HISTORY OF DEEP VEIN THROMBOPHLEBITIS OF LOWER EXTREMITY: ICD-10-CM

## 2019-07-01 DIAGNOSIS — Z96.651 STATUS POST TOTAL KNEE REPLACEMENT, RIGHT: ICD-10-CM

## 2019-07-01 DIAGNOSIS — I10 ESSENTIAL HYPERTENSION: ICD-10-CM

## 2019-07-01 DIAGNOSIS — Z79.899 MEDICATION MANAGEMENT: ICD-10-CM

## 2019-07-01 LAB
ALBUMIN SERPL-MCNC: 4.1 G/DL (ref 3.5–5)
ALP SERPL-CCNC: 76 U/L (ref 45–120)
ALT SERPL W P-5'-P-CCNC: 21 U/L (ref 0–45)
ANION GAP SERPL CALCULATED.3IONS-SCNC: 10 MMOL/L (ref 5–18)
AST SERPL W P-5'-P-CCNC: 19 U/L (ref 0–40)
BILIRUB SERPL-MCNC: 0.8 MG/DL (ref 0–1)
BUN SERPL-MCNC: 17 MG/DL (ref 8–28)
CALCIUM SERPL-MCNC: 9.8 MG/DL (ref 8.5–10.5)
CHLORIDE BLD-SCNC: 108 MMOL/L (ref 98–107)
CHOLEST SERPL-MCNC: 152 MG/DL
CK SERPL-CCNC: 244 U/L (ref 30–190)
CO2 SERPL-SCNC: 25 MMOL/L (ref 22–31)
CREAT SERPL-MCNC: 1.15 MG/DL (ref 0.7–1.3)
FASTING STATUS PATIENT QL REPORTED: YES
GFR SERPL CREATININE-BSD FRML MDRD: >60 ML/MIN/1.73M2
GLUCOSE BLD-MCNC: 85 MG/DL (ref 70–125)
HDLC SERPL-MCNC: 52 MG/DL
LDLC SERPL CALC-MCNC: 72 MG/DL
POTASSIUM BLD-SCNC: 4.2 MMOL/L (ref 3.5–5)
PROT SERPL-MCNC: 6.5 G/DL (ref 6–8)
SODIUM SERPL-SCNC: 143 MMOL/L (ref 136–145)
TRIGL SERPL-MCNC: 140 MG/DL

## 2019-07-01 ASSESSMENT — MIFFLIN-ST. JEOR: SCORE: 1628.44

## 2019-08-12 ENCOUNTER — RECORDS - HEALTHEAST (OUTPATIENT)
Dept: ADMINISTRATIVE | Facility: OTHER | Age: 79
End: 2019-08-12

## 2019-10-14 ENCOUNTER — AMBULATORY - HEALTHEAST (OUTPATIENT)
Dept: OTHER | Facility: CLINIC | Age: 79
End: 2019-10-14

## 2019-10-14 ENCOUNTER — DOCUMENTATION ONLY (OUTPATIENT)
Dept: OTHER | Facility: CLINIC | Age: 79
End: 2019-10-14

## 2019-11-09 ENCOUNTER — COMMUNICATION - HEALTHEAST (OUTPATIENT)
Dept: INTERNAL MEDICINE | Facility: CLINIC | Age: 79
End: 2019-11-09

## 2019-11-09 DIAGNOSIS — K21.9 GASTROESOPHAGEAL REFLUX DISEASE WITHOUT ESOPHAGITIS: ICD-10-CM

## 2019-11-21 ENCOUNTER — COMMUNICATION - HEALTHEAST (OUTPATIENT)
Dept: INTERNAL MEDICINE | Facility: CLINIC | Age: 79
End: 2019-11-21

## 2019-11-21 DIAGNOSIS — I10 ESSENTIAL HYPERTENSION: ICD-10-CM

## 2019-11-25 ENCOUNTER — RECORDS - HEALTHEAST (OUTPATIENT)
Dept: ADMINISTRATIVE | Facility: OTHER | Age: 79
End: 2019-11-25

## 2019-12-10 ENCOUNTER — OFFICE VISIT - HEALTHEAST (OUTPATIENT)
Dept: FAMILY MEDICINE | Facility: CLINIC | Age: 79
End: 2019-12-10

## 2019-12-10 DIAGNOSIS — A08.4 VIRAL GASTROENTERITIS: ICD-10-CM

## 2020-01-13 ENCOUNTER — RECORDS - HEALTHEAST (OUTPATIENT)
Dept: ADMINISTRATIVE | Facility: OTHER | Age: 80
End: 2020-01-13

## 2020-01-14 ENCOUNTER — COMMUNICATION - HEALTHEAST (OUTPATIENT)
Dept: INTERNAL MEDICINE | Facility: CLINIC | Age: 80
End: 2020-01-14

## 2020-01-14 DIAGNOSIS — E03.4 HYPOTHYROIDISM DUE TO ACQUIRED ATROPHY OF THYROID: ICD-10-CM

## 2020-01-14 DIAGNOSIS — I10 ESSENTIAL HYPERTENSION: ICD-10-CM

## 2020-01-21 ENCOUNTER — RECORDS - HEALTHEAST (OUTPATIENT)
Dept: ADMINISTRATIVE | Facility: OTHER | Age: 80
End: 2020-01-21

## 2020-01-22 ENCOUNTER — OFFICE VISIT - HEALTHEAST (OUTPATIENT)
Dept: INTERNAL MEDICINE | Facility: CLINIC | Age: 80
End: 2020-01-22

## 2020-01-22 DIAGNOSIS — C76.0 HEAD AND NECK CANCER (H): ICD-10-CM

## 2020-01-22 DIAGNOSIS — Z96.651 STATUS POST TOTAL KNEE REPLACEMENT, RIGHT: ICD-10-CM

## 2020-01-22 DIAGNOSIS — Z79.899 MEDICATION MANAGEMENT: ICD-10-CM

## 2020-01-22 DIAGNOSIS — E03.4 HYPOTHYROIDISM DUE TO ACQUIRED ATROPHY OF THYROID: ICD-10-CM

## 2020-01-22 DIAGNOSIS — E78.2 MIXED HYPERLIPIDEMIA: ICD-10-CM

## 2020-01-22 DIAGNOSIS — I10 ESSENTIAL HYPERTENSION: ICD-10-CM

## 2020-01-22 DIAGNOSIS — Z00.00 ROUTINE GENERAL MEDICAL EXAMINATION AT A HEALTH CARE FACILITY: ICD-10-CM

## 2020-01-22 LAB
ALBUMIN SERPL-MCNC: 4.2 G/DL (ref 3.5–5)
ALBUMIN UR-MCNC: NEGATIVE MG/DL
ALP SERPL-CCNC: 70 U/L (ref 45–120)
ALT SERPL W P-5'-P-CCNC: 24 U/L (ref 0–45)
ANION GAP SERPL CALCULATED.3IONS-SCNC: 12 MMOL/L (ref 5–18)
APPEARANCE UR: CLEAR
AST SERPL W P-5'-P-CCNC: 23 U/L (ref 0–40)
BASOPHILS # BLD AUTO: 0 THOU/UL (ref 0–0.2)
BASOPHILS NFR BLD AUTO: 1 % (ref 0–2)
BILIRUB SERPL-MCNC: 0.8 MG/DL (ref 0–1)
BILIRUB UR QL STRIP: NEGATIVE
BUN SERPL-MCNC: 14 MG/DL (ref 8–28)
CALCIUM SERPL-MCNC: 9.3 MG/DL (ref 8.5–10.5)
CHLORIDE BLD-SCNC: 106 MMOL/L (ref 98–107)
CHOLEST SERPL-MCNC: 176 MG/DL
CO2 SERPL-SCNC: 24 MMOL/L (ref 22–31)
COLOR UR AUTO: YELLOW
CREAT SERPL-MCNC: 1.14 MG/DL (ref 0.7–1.3)
EOSINOPHIL # BLD AUTO: 0.1 THOU/UL (ref 0–0.4)
EOSINOPHIL NFR BLD AUTO: 4 % (ref 0–6)
ERYTHROCYTE [DISTWIDTH] IN BLOOD BY AUTOMATED COUNT: 14.6 % (ref 11–14.5)
FASTING STATUS PATIENT QL REPORTED: YES
GFR SERPL CREATININE-BSD FRML MDRD: >60 ML/MIN/1.73M2
GLUCOSE BLD-MCNC: 91 MG/DL (ref 70–125)
GLUCOSE UR STRIP-MCNC: NEGATIVE MG/DL
HCT VFR BLD AUTO: 47.1 % (ref 40–54)
HDLC SERPL-MCNC: 62 MG/DL
HGB BLD-MCNC: 15.3 G/DL (ref 14–18)
HGB UR QL STRIP: NEGATIVE
KETONES UR STRIP-MCNC: NEGATIVE MG/DL
LDLC SERPL CALC-MCNC: 83 MG/DL
LEUKOCYTE ESTERASE UR QL STRIP: NEGATIVE
LYMPHOCYTES # BLD AUTO: 0.7 THOU/UL (ref 0.8–4.4)
LYMPHOCYTES NFR BLD AUTO: 20 % (ref 20–40)
MCH RBC QN AUTO: 30.8 PG (ref 27–34)
MCHC RBC AUTO-ENTMCNC: 32.5 G/DL (ref 32–36)
MCV RBC AUTO: 95 FL (ref 80–100)
MONOCYTES # BLD AUTO: 0.3 THOU/UL (ref 0–0.9)
MONOCYTES NFR BLD AUTO: 10 % (ref 2–10)
NEUTROPHILS # BLD AUTO: 2.2 THOU/UL (ref 2–7.7)
NEUTROPHILS NFR BLD AUTO: 66 % (ref 50–70)
NITRATE UR QL: NEGATIVE
PH UR STRIP: 7 [PH] (ref 5–8)
PLATELET # BLD AUTO: 128 THOU/UL (ref 140–440)
PMV BLD AUTO: 11.9 FL (ref 8.5–12.5)
POTASSIUM BLD-SCNC: 4.2 MMOL/L (ref 3.5–5)
PROT SERPL-MCNC: 6.8 G/DL (ref 6–8)
RBC # BLD AUTO: 4.96 MILL/UL (ref 4.4–6.2)
SODIUM SERPL-SCNC: 142 MMOL/L (ref 136–145)
SP GR UR STRIP: 1.02 (ref 1–1.03)
TRIGL SERPL-MCNC: 153 MG/DL
TSH SERPL DL<=0.005 MIU/L-ACNC: 5 UIU/ML (ref 0.3–5)
UROBILINOGEN UR STRIP-ACNC: NORMAL
WBC: 3.4 THOU/UL (ref 4–11)

## 2020-01-22 ASSESSMENT — MIFFLIN-ST. JEOR: SCORE: 1637.52

## 2020-01-27 ENCOUNTER — COMMUNICATION - HEALTHEAST (OUTPATIENT)
Dept: INTERNAL MEDICINE | Facility: CLINIC | Age: 80
End: 2020-01-27

## 2020-02-06 ENCOUNTER — OFFICE VISIT - HEALTHEAST (OUTPATIENT)
Dept: INTERNAL MEDICINE | Facility: CLINIC | Age: 80
End: 2020-02-06

## 2020-02-06 DIAGNOSIS — I10 ESSENTIAL HYPERTENSION: ICD-10-CM

## 2020-02-06 DIAGNOSIS — C76.0 HEAD AND NECK CANCER (H): ICD-10-CM

## 2020-02-06 DIAGNOSIS — Z01.818 PREOP GENERAL PHYSICAL EXAM: ICD-10-CM

## 2020-02-06 LAB
ATRIAL RATE - MUSE: 78 BPM
DIASTOLIC BLOOD PRESSURE - MUSE: NORMAL
INTERPRETATION ECG - MUSE: NORMAL
P AXIS - MUSE: 48 DEGREES
PR INTERVAL - MUSE: 218 MS
QRS DURATION - MUSE: 78 MS
QT - MUSE: 370 MS
QTC - MUSE: 421 MS
R AXIS - MUSE: -21 DEGREES
SYSTOLIC BLOOD PRESSURE - MUSE: NORMAL
T AXIS - MUSE: 29 DEGREES
VENTRICULAR RATE- MUSE: 78 BPM

## 2020-02-06 ASSESSMENT — MIFFLIN-ST. JEOR: SCORE: 1655.66

## 2020-03-03 ENCOUNTER — COMMUNICATION - HEALTHEAST (OUTPATIENT)
Dept: INTERNAL MEDICINE | Facility: CLINIC | Age: 80
End: 2020-03-03

## 2020-03-03 DIAGNOSIS — I10 ESSENTIAL HYPERTENSION: ICD-10-CM

## 2020-03-04 ENCOUNTER — COMMUNICATION - HEALTHEAST (OUTPATIENT)
Dept: INTERNAL MEDICINE | Facility: CLINIC | Age: 80
End: 2020-03-04

## 2020-03-04 DIAGNOSIS — I10 ESSENTIAL HYPERTENSION: ICD-10-CM

## 2020-03-09 ENCOUNTER — RECORDS - HEALTHEAST (OUTPATIENT)
Dept: ADMINISTRATIVE | Facility: OTHER | Age: 80
End: 2020-03-09

## 2020-05-05 ENCOUNTER — COMMUNICATION - HEALTHEAST (OUTPATIENT)
Dept: INTERNAL MEDICINE | Facility: CLINIC | Age: 80
End: 2020-05-05

## 2020-05-05 DIAGNOSIS — I25.10 ASHD (ARTERIOSCLEROTIC HEART DISEASE): ICD-10-CM

## 2020-05-06 ENCOUNTER — COMMUNICATION - HEALTHEAST (OUTPATIENT)
Dept: INTERNAL MEDICINE | Facility: CLINIC | Age: 80
End: 2020-05-06

## 2020-05-06 DIAGNOSIS — I10 ESSENTIAL HYPERTENSION: ICD-10-CM

## 2020-06-06 ENCOUNTER — COMMUNICATION - HEALTHEAST (OUTPATIENT)
Dept: INTERNAL MEDICINE | Facility: CLINIC | Age: 80
End: 2020-06-06

## 2020-06-06 DIAGNOSIS — I10 ESSENTIAL HYPERTENSION: ICD-10-CM

## 2020-06-09 ENCOUNTER — RECORDS - HEALTHEAST (OUTPATIENT)
Dept: ADMINISTRATIVE | Facility: OTHER | Age: 80
End: 2020-06-09

## 2020-06-30 ENCOUNTER — RECORDS - HEALTHEAST (OUTPATIENT)
Dept: ADMINISTRATIVE | Facility: OTHER | Age: 80
End: 2020-06-30

## 2020-08-19 ENCOUNTER — COMMUNICATION - HEALTHEAST (OUTPATIENT)
Dept: INTERNAL MEDICINE | Facility: CLINIC | Age: 80
End: 2020-08-19

## 2020-08-31 ENCOUNTER — OFFICE VISIT - HEALTHEAST (OUTPATIENT)
Dept: INTERNAL MEDICINE | Facility: CLINIC | Age: 80
End: 2020-08-31

## 2020-08-31 DIAGNOSIS — I10 ESSENTIAL HYPERTENSION: ICD-10-CM

## 2020-08-31 DIAGNOSIS — D69.6 THROMBOCYTOPENIA (H): ICD-10-CM

## 2020-08-31 DIAGNOSIS — Z01.818 PREOPERATIVE EXAMINATION: ICD-10-CM

## 2020-08-31 LAB
CREAT SERPL-MCNC: 1.34 MG/DL (ref 0.7–1.3)
GFR SERPL CREATININE-BSD FRML MDRD: 51 ML/MIN/1.73M2
PLATELET # BLD AUTO: 148 THOU/UL (ref 140–440)

## 2020-09-08 ENCOUNTER — COMMUNICATION - HEALTHEAST (OUTPATIENT)
Dept: INTERNAL MEDICINE | Facility: CLINIC | Age: 80
End: 2020-09-08

## 2020-09-14 ENCOUNTER — RECORDS - HEALTHEAST (OUTPATIENT)
Dept: ADMINISTRATIVE | Facility: OTHER | Age: 80
End: 2020-09-14

## 2020-11-12 ENCOUNTER — OFFICE VISIT - HEALTHEAST (OUTPATIENT)
Dept: INTERNAL MEDICINE | Facility: CLINIC | Age: 80
End: 2020-11-12

## 2020-11-12 DIAGNOSIS — I10 ESSENTIAL HYPERTENSION: ICD-10-CM

## 2020-11-12 DIAGNOSIS — E03.4 HYPOTHYROIDISM DUE TO ACQUIRED ATROPHY OF THYROID: ICD-10-CM

## 2020-11-12 DIAGNOSIS — Z96.651 STATUS POST TOTAL KNEE REPLACEMENT, RIGHT: ICD-10-CM

## 2020-11-12 DIAGNOSIS — Z85.89 HISTORY OF HEAD AND NECK CANCER: ICD-10-CM

## 2020-11-12 DIAGNOSIS — E78.2 MIXED HYPERLIPIDEMIA: ICD-10-CM

## 2020-11-12 DIAGNOSIS — Z86.72 HISTORY OF DEEP VEIN THROMBOPHLEBITIS OF LOWER EXTREMITY: ICD-10-CM

## 2020-11-12 LAB
ALBUMIN SERPL-MCNC: 4.3 G/DL (ref 3.5–5)
ALP SERPL-CCNC: 75 U/L (ref 45–120)
ALT SERPL W P-5'-P-CCNC: 27 U/L (ref 0–45)
ANION GAP SERPL CALCULATED.3IONS-SCNC: 8 MMOL/L (ref 5–18)
AST SERPL W P-5'-P-CCNC: 22 U/L (ref 0–40)
BILIRUB SERPL-MCNC: 0.5 MG/DL (ref 0–1)
BUN SERPL-MCNC: 19 MG/DL (ref 8–28)
CALCIUM SERPL-MCNC: 9.4 MG/DL (ref 8.5–10.5)
CHLORIDE BLD-SCNC: 107 MMOL/L (ref 98–107)
CO2 SERPL-SCNC: 25 MMOL/L (ref 22–31)
CREAT SERPL-MCNC: 1.28 MG/DL (ref 0.7–1.3)
ERYTHROCYTE [DISTWIDTH] IN BLOOD BY AUTOMATED COUNT: 12.9 % (ref 11–14.5)
GFR SERPL CREATININE-BSD FRML MDRD: 54 ML/MIN/1.73M2
GLUCOSE BLD-MCNC: 101 MG/DL (ref 70–125)
HCT VFR BLD AUTO: 46.9 % (ref 40–54)
HGB BLD-MCNC: 15.6 G/DL (ref 14–18)
MCH RBC QN AUTO: 31 PG (ref 27–34)
MCHC RBC AUTO-ENTMCNC: 33.2 G/DL (ref 32–36)
MCV RBC AUTO: 93 FL (ref 80–100)
PLATELET # BLD AUTO: 133 THOU/UL (ref 140–440)
PMV BLD AUTO: 8.5 FL (ref 7–10)
POTASSIUM BLD-SCNC: 4 MMOL/L (ref 3.5–5)
PROT SERPL-MCNC: 6.9 G/DL (ref 6–8)
RBC # BLD AUTO: 5.03 MILL/UL (ref 4.4–6.2)
SODIUM SERPL-SCNC: 140 MMOL/L (ref 136–145)
TSH SERPL DL<=0.005 MIU/L-ACNC: 4.63 UIU/ML (ref 0.3–5)
WBC: 4.5 THOU/UL (ref 4–11)

## 2020-11-13 ENCOUNTER — COMMUNICATION - HEALTHEAST (OUTPATIENT)
Dept: INTERNAL MEDICINE | Facility: CLINIC | Age: 80
End: 2020-11-13

## 2020-12-03 ENCOUNTER — RECORDS - HEALTHEAST (OUTPATIENT)
Dept: ADMINISTRATIVE | Facility: OTHER | Age: 80
End: 2020-12-03

## 2021-01-07 ENCOUNTER — COMMUNICATION - HEALTHEAST (OUTPATIENT)
Dept: INTERNAL MEDICINE | Facility: CLINIC | Age: 81
End: 2021-01-07

## 2021-01-07 DIAGNOSIS — I10 ESSENTIAL HYPERTENSION: ICD-10-CM

## 2021-01-26 ENCOUNTER — COMMUNICATION - HEALTHEAST (OUTPATIENT)
Dept: ADMINISTRATIVE | Facility: CLINIC | Age: 81
End: 2021-01-26

## 2021-01-26 DIAGNOSIS — K21.9 GASTROESOPHAGEAL REFLUX DISEASE WITHOUT ESOPHAGITIS: ICD-10-CM

## 2021-04-04 ENCOUNTER — COMMUNICATION - HEALTHEAST (OUTPATIENT)
Dept: INTERNAL MEDICINE | Facility: CLINIC | Age: 81
End: 2021-04-04

## 2021-04-04 DIAGNOSIS — E03.4 HYPOTHYROIDISM DUE TO ACQUIRED ATROPHY OF THYROID: ICD-10-CM

## 2021-04-06 ENCOUNTER — RECORDS - HEALTHEAST (OUTPATIENT)
Dept: ADMINISTRATIVE | Facility: OTHER | Age: 81
End: 2021-04-06

## 2021-04-07 ENCOUNTER — COMMUNICATION - HEALTHEAST (OUTPATIENT)
Dept: ADMINISTRATIVE | Facility: CLINIC | Age: 81
End: 2021-04-07

## 2021-04-17 ENCOUNTER — COMMUNICATION - HEALTHEAST (OUTPATIENT)
Dept: INTERNAL MEDICINE | Facility: CLINIC | Age: 81
End: 2021-04-17

## 2021-04-17 DIAGNOSIS — I25.10 ASHD (ARTERIOSCLEROTIC HEART DISEASE): ICD-10-CM

## 2021-04-28 ENCOUNTER — RECORDS - HEALTHEAST (OUTPATIENT)
Dept: ADMINISTRATIVE | Facility: OTHER | Age: 81
End: 2021-04-28

## 2021-05-22 ENCOUNTER — HEALTH MAINTENANCE LETTER (OUTPATIENT)
Age: 81
End: 2021-05-22

## 2021-05-26 ENCOUNTER — RECORDS - HEALTHEAST (OUTPATIENT)
Dept: ADMINISTRATIVE | Facility: CLINIC | Age: 81
End: 2021-05-26

## 2021-05-27 NOTE — PROGRESS NOTES
"OFFICE VISIT NOTE    Subjective:   Chief Complaint:  Follow-up    78-year-old hypertension, right total knee replacement, postop anemia.  He continues to do well.  Physical therapy is going well.  Almost back to complete normal range of motion.  No longer requires any pain medicines.  No cardiopulmonary symptoms.    Current Outpatient Medications   Medication Sig     amLODIPine (NORVASC) 5 MG tablet Take 1 tablet (5 mg total) by mouth daily.     carboxymethylcellulose-glycerin (REFRESH OPTIVE) 0.5-0.9 % Drop Administer 1 drop to both eyes 2 (two) times a day as needed.     levothyroxine (SYNTHROID, LEVOTHROID) 75 MCG tablet Take 1 tablet (75 mcg total) by mouth daily.     losartan (COZAAR) 50 MG tablet Take 1 tablet (50 mg total) by mouth daily.     omeprazole (PRILOSEC) 20 MG capsule Take 20 mg by mouth 2 (two) times a day before meals.     simvastatin (ZOCOR) 20 MG tablet Take 1 tablet (20 mg total) by mouth at bedtime.     naproxen (NAPROSYN) 500 MG tablet Take 500 mg by mouth 2 (two) times a day with meals.       PSFHx: Tobacco Status:  He  reports that he quit smoking about 19 years ago. His smoking use included cigars. He has never used smokeless tobacco.    Review of Systems:  A comprehensive review of systems is negative except for the comments above    Objective:    Pulse 69   Ht 5' 10\" (1.778 m)   Wt 197 lb (89.4 kg)   SpO2 98%   BMI 28.27 kg/m    GENERAL: No acute distress.  Weight is stable.  Today's blood pressure 128/82.  Pulse 69 and regular.  Good flexion extension of the right knee.  No edema.  Lungs are clear.  Heart shows a regular rhythm without murmur gallop or rub.  No carotid bruits.  Neurologic exam remains normal.    Assessment & Plan   Michael Maldonado is a 78 y.o. male.    Medically he is stable.  Were going to continue off blood pressure medications for now.  Recheck blood pressure in 6 weeks.  If pressure gets over 140 systolic, restart losartan.  He needs a follow-up CT scan of the " chest to evaluate pulmonary nodule.  If this is normal, I would stop doing follow-ups.  He is a non-smoker.    Diagnoses and all orders for this visit:    Essential hypertension    Status post total knee replacement, right        The following high BMI interventions were performed this visit: encouragement to exercise    Memo Martinez MD  Transcription using voice recognition software, may contain typographical errors.

## 2021-05-27 NOTE — TELEPHONE ENCOUNTER
Refill Approved    Rx renewed per Medication Renewal Policy. Medication was last renewed on 1/24/19.    Yeimi Turner, Care Connection Triage/Med Refill 3/24/2019     Requested Prescriptions   Pending Prescriptions Disp Refills     citalopram (CELEXA) 10 MG tablet 60 tablet 0     Sig: Take 1 tablet (10 mg total) by mouth daily.    SSRI Refill Protocol  Passed - 3/24/2019  4:44 PM       Passed - PCP or prescribing provider visit in last year    Last office visit with prescriber/PCP: 3/11/2019 Memo Martinez MD OR same dept: 3/11/2019 Memo Martinez MD OR same specialty: 3/11/2019 Memo Martinez MD  Last physical: 12/31/2018 Last MTM visit: Visit date not found   Next visit within 3 mo: Visit date not found  Next physical within 3 mo: Visit date not found  Prescriber OR PCP: Memo Martinez MD  Last diagnosis associated with med order: 1. BERRY (generalized anxiety disorder)  - citalopram (CELEXA) 10 MG tablet; Take 1 tablet (10 mg total) by mouth daily.  Dispense: 60 tablet; Refill: 0    If protocol passes may refill for 12 months if within 3 months of last provider visit (or a total of 15 months).

## 2021-05-28 NOTE — PROGRESS NOTES
"OFFICE VISIT NOTE    Subjective:   Chief Complaint:  Follow-up    78-year-old man in for follow-up regarding hypertension as well as history of osteoarthritis with right total knee replacement.  He is doing well.  Recent scan showed stable pulmonary does have stents of coronary artery calcifications.    Current Outpatient Medications   Medication Sig     carboxymethylcellulose-glycerin (REFRESH OPTIVE) 0.5-0.9 % Drop Administer 1 drop to both eyes 2 (two) times a day as needed.     levothyroxine (SYNTHROID, LEVOTHROID) 75 MCG tablet Take 1 tablet (75 mcg total) by mouth daily.     losartan (COZAAR) 50 MG tablet Take 1 tablet (50 mg total) by mouth daily.     omeprazole (PRILOSEC) 20 MG capsule Take 20 mg by mouth 2 (two) times a day before meals.     simvastatin (ZOCOR) 20 MG tablet Take 1 tablet (20 mg total) by mouth at bedtime.     amLODIPine (NORVASC) 5 MG tablet Take 1 tablet (5 mg total) by mouth daily.       PSFHx: Tobacco Status:  He  reports that he quit smoking about 19 years ago. His smoking use included cigars. He has never used smokeless tobacco.    Review of Systems:  A comprehensive review of systems is negative except for the comments above    Objective:    Pulse 78   Ht 5' 10\" (1.778 m)   Wt 201 lb (91.2 kg)   SpO2 98%   BMI 28.84 kg/m    GENERAL: No acute distress.  Today's blood pressure is 130/72.  No change on standing.  Pulse 78 and regular.  Gait seems normal.  Fairly good range of motion of the knees at this time.  Carotid arteries seem normal.  No evidence of any ongoing phlebitis.    Assessment & Plan   Michael Maldonado is a 78 y.o. male.    He is stable.  Blood pressure is at goal.  Needs to increase his simvastatin to 40 mg daily to help with a goal getting LDL to 70.  Return to clinic in 4 weeks for blood pressure check as well as a lipid profile, hepatic profile, CPK.  25 minutes spent with this patient and wife answering questions.  Majority of the visit was counseling and " coordination of care.    Diagnoses and all orders for this visit:    History of deep vein thrombophlebitis of lower extremity    Essential hypertension    Status post total knee replacement, right        The following high BMI interventions were performed this visit: encouragement to exercise    Memo Martinez MD  Transcription using voice recognition software, may contain typographical errors.

## 2021-05-29 NOTE — TELEPHONE ENCOUNTER
Pharmacy stated patient picked up prescription on May 20th. Spoke to patient she will look around her house to see if she has it somewhere at her house, she will call back if she needs prescription sent to pharmacy.   Roseanne Lambert, CMA

## 2021-05-29 NOTE — TELEPHONE ENCOUNTER
Medication Request  Medication name:  simvastatin (ZOCOR) 40 MG tablet 90 tablet 3 5/20/2019     Sig - Route: Take 1 tablet (40 mg total) by mouth every evening. - Oral    Sent to pharmacy as: simvastatin (ZOCOR) 40 MG tablet    E-Prescribing Status: Receipt confirmed by pharmacy (5/20/2019 10:07 AM CDT)        Pharmacy Name and Location: [id number]  Reason for request:   Patient's wife states Pharmacy will not fill above prescription until 7/24/19.  Please send new prescription of above medication as patient is out of medication.  When did you use medication last?:  Patient's wife states a couple days ago.  Patient offered appointment:  No  Okay to leave a detailed message: yes

## 2021-05-30 VITALS — WEIGHT: 205 LBS | BODY MASS INDEX: 30.36 KG/M2 | HEIGHT: 69 IN

## 2021-05-30 NOTE — PROGRESS NOTES
"OFFICE VISIT NOTE    Subjective:   Chief Complaint:  Follow-up    This is a 78-year-old man who is in for follow-up regarding hyperlipidemia hypertension.  He also had a knee replacement this past January with complicated by a deep vein thrombophlebitis.  He is now off of all blood thinners.  He is doing quite well.  I recently increased his simvastatin to 40 mg daily.  He has calcified plaque in the coronary arteries on scans.  No angina reported.    Current Outpatient Medications   Medication Sig     amLODIPine (NORVASC) 5 MG tablet Take 1 tablet (5 mg total) by mouth daily.     carboxymethylcellulose-glycerin (REFRESH OPTIVE) 0.5-0.9 % Drop Administer 1 drop to both eyes 2 (two) times a day as needed.     levothyroxine (SYNTHROID, LEVOTHROID) 75 MCG tablet Take 1 tablet (75 mcg total) by mouth daily.     losartan (COZAAR) 50 MG tablet Take 1 tablet (50 mg total) by mouth daily.     omeprazole (PRILOSEC) 20 MG capsule Take 20 mg by mouth 2 (two) times a day before meals.     simvastatin (ZOCOR) 40 MG tablet Take 1 tablet (40 mg total) by mouth every evening.       PSFHx: Tobacco Status:  He  reports that he quit smoking about 19 years ago. His smoking use included cigars. He has never used smokeless tobacco.    Review of Systems:  A comprehensive review of systems is negative except for the comments above    Objective:    Pulse 70   Ht 5' 10\" (1.778 m)   Wt 200 lb (90.7 kg)   SpO2 96%   BMI 28.70 kg/m    GENERAL: No acute distress.  Blood pressure is 138/76.  Pulse is 72.  Oxygen saturations 90% on room air.  No carotid bruits.  Lungs are clear.  Heart shows a regular rhythm without murmur gallop or rub.  No peripheral edema.  No muscle tenderness.  Joints look okay.  Knee replacement is well-healed and he has good range of motion to it.    Assessment & Plan   Michael Maldonado is a 78 y.o. male.    He is stable.  We will check his lipid profile along today with a CPK and a liver profile.  Consider switching him " to Lipitor though I think in the past he had trouble with that drug.  May have to discontinue amlodipine with higher dosages of Zocor.  Plan to see him again in about 3 months or sooner if we need to adjust labs.    Diagnoses and all orders for this visit:    Status post total knee replacement, right    Mixed hyperlipidemia  -     Lipid Cascade    Essential hypertension    History of deep vein thrombophlebitis of lower extremity    Medication management  -     Comprehensive Metabolic Panel  -     CK Total            Memo Martinez MD  Transcription using voice recognition software, may contain typographical errors.

## 2021-05-31 ENCOUNTER — RECORDS - HEALTHEAST (OUTPATIENT)
Dept: ADMINISTRATIVE | Facility: CLINIC | Age: 81
End: 2021-05-31

## 2021-05-31 VITALS — BODY MASS INDEX: 29.62 KG/M2 | WEIGHT: 200 LBS | HEIGHT: 69 IN

## 2021-05-31 VITALS — BODY MASS INDEX: 29.47 KG/M2 | WEIGHT: 199 LBS | HEIGHT: 69 IN

## 2021-05-31 VITALS — BODY MASS INDEX: 29.77 KG/M2 | WEIGHT: 201 LBS | HEIGHT: 69 IN

## 2021-05-31 VITALS — BODY MASS INDEX: 29.33 KG/M2 | WEIGHT: 198 LBS | HEIGHT: 69 IN

## 2021-05-31 VITALS — BODY MASS INDEX: 29.92 KG/M2 | WEIGHT: 202 LBS | HEIGHT: 69 IN

## 2021-05-31 VITALS — HEIGHT: 69 IN | WEIGHT: 199 LBS | BODY MASS INDEX: 29.47 KG/M2

## 2021-06-01 VITALS — WEIGHT: 201 LBS | BODY MASS INDEX: 29.77 KG/M2 | HEIGHT: 69 IN

## 2021-06-01 VITALS — BODY MASS INDEX: 29.33 KG/M2 | WEIGHT: 198 LBS | HEIGHT: 69 IN

## 2021-06-02 VITALS — WEIGHT: 197 LBS | HEIGHT: 70 IN | BODY MASS INDEX: 28.2 KG/M2

## 2021-06-02 VITALS — BODY MASS INDEX: 27.92 KG/M2 | HEIGHT: 70 IN | WEIGHT: 195 LBS

## 2021-06-02 VITALS — WEIGHT: 206 LBS | HEIGHT: 69 IN | BODY MASS INDEX: 30.51 KG/M2

## 2021-06-02 VITALS — BODY MASS INDEX: 30.07 KG/M2 | WEIGHT: 203 LBS | HEIGHT: 69 IN

## 2021-06-02 VITALS — HEIGHT: 70 IN | WEIGHT: 195 LBS | BODY MASS INDEX: 27.92 KG/M2

## 2021-06-02 VITALS — HEIGHT: 70 IN | WEIGHT: 196 LBS | BODY MASS INDEX: 28.06 KG/M2

## 2021-06-02 VITALS — WEIGHT: 207 LBS | BODY MASS INDEX: 30.66 KG/M2 | HEIGHT: 69 IN

## 2021-06-02 VITALS — WEIGHT: 206 LBS | BODY MASS INDEX: 29.49 KG/M2 | HEIGHT: 70 IN

## 2021-06-02 VITALS — BODY MASS INDEX: 28.49 KG/M2 | WEIGHT: 198.56 LBS

## 2021-06-03 VITALS — WEIGHT: 201 LBS | BODY MASS INDEX: 28.77 KG/M2 | HEIGHT: 70 IN

## 2021-06-03 VITALS — WEIGHT: 200 LBS | HEIGHT: 70 IN | BODY MASS INDEX: 28.63 KG/M2

## 2021-06-03 NOTE — TELEPHONE ENCOUNTER
RN cannot approve Refill Request    RN can NOT refill this medication historical medication requested. Last office visit: 7/1/2019 Memo Martinez MD Last Physical: 12/31/2018 Last MTM visit: Visit date not found Last visit same specialty: 7/1/2019 Memo Martinez MD.  Next visit within 3 mo: Visit date not found  Next physical within 3 mo: Visit date not found      Maryana Doty, [facility] Connection Triage/Med Refill 11/9/2019    Requested Prescriptions   Pending Prescriptions Disp Refills     omeprazole (PRILOSEC) 20 MG capsule [Pharmacy Med Name: OMEPRAZOLE DR 20 MG CAPSULE] 90 capsule 3     Sig: TAKE 1 CAPSULE BY MOUTH EVERY DAY       GI Medications Refill Protocol Passed - 11/9/2019  1:39 AM        Passed - PCP or prescribing provider visit in last 12 or next 3 months.     Last office visit with prescriber/PCP: 7/1/2019 Memo Martinez MD OR same dept: 7/1/2019 Memo Martinez MD OR same specialty: 7/1/2019 Memo Martinez MD  Last physical: 12/31/2018 Last MTM visit: Visit date not found   Next visit within 3 mo: Visit date not found  Next physical within 3 mo: Visit date not found  Prescriber OR PCP: Memo Martinez MD  Last diagnosis associated with med order: 1. Gastroesophageal reflux disease without esophagitis  - omeprazole (PRILOSEC) 20 MG capsule [Pharmacy Med Name: OMEPRAZOLE DR 20 MG CAPSULE]; TAKE 1 CAPSULE BY MOUTH EVERY DAY  Dispense: 90 capsule; Refill: 3    If protocol passes may refill for 12 months if within 3 months of last provider visit (or a total of 15 months).

## 2021-06-03 NOTE — TELEPHONE ENCOUNTER
Refill Approved    Rx renewed per Medication Renewal Policy. Medication was last renewed on 11/19/18.    Beti Perez, [facility] Triage/Med Refill 11/22/2019     Requested Prescriptions   Pending Prescriptions Disp Refills     losartan (COZAAR) 50 MG tablet [Pharmacy Med Name: LOSARTAN POTASSIUM 50 MG TAB] 90 tablet 3     Sig: TAKE 1 TABLET BY MOUTH EVERY DAY       Angiotensin Receptor Blocker Protocol Passed - 11/21/2019  4:17 AM        Passed - PCP or prescribing provider visit in past 12 months       Last office visit with prescriber/PCP: 7/1/2019 Memo Martinez MD OR same dept: 7/1/2019 Memo Martinez MD OR same specialty: 7/1/2019 Memo Martinez MD  Last physical: 12/31/2018 Last MTM visit: Visit date not found   Next visit within 3 mo: Visit date not found  Next physical within 3 mo: Visit date not found  Prescriber OR PCP: Memo Martinez MD  Last diagnosis associated with med order: 1. Essential hypertension  - losartan (COZAAR) 50 MG tablet [Pharmacy Med Name: LOSARTAN POTASSIUM 50 MG TAB]; TAKE 1 TABLET BY MOUTH EVERY DAY  Dispense: 90 tablet; Refill: 3    If protocol passes may refill for 12 months if within 3 months of last provider visit (or a total of 15 months).             Passed - Serum potassium within the past 12 months     Lab Results   Component Value Date    Potassium 4.2 07/01/2019             Passed - Blood pressure filed in past 12 months     BP Readings from Last 1 Encounters:   05/20/19 132/70             Passed - Serum creatinine within the past 12 months     Creatinine   Date Value Ref Range Status   07/01/2019 1.15 0.70 - 1.30 mg/dL Final

## 2021-06-04 VITALS
HEIGHT: 70 IN | OXYGEN SATURATION: 98 % | SYSTOLIC BLOOD PRESSURE: 140 MMHG | WEIGHT: 206 LBS | HEART RATE: 85 BPM | BODY MASS INDEX: 29.49 KG/M2 | DIASTOLIC BLOOD PRESSURE: 78 MMHG

## 2021-06-04 VITALS
RESPIRATION RATE: 16 BRPM | HEART RATE: 84 BPM | DIASTOLIC BLOOD PRESSURE: 72 MMHG | WEIGHT: 202.8 LBS | TEMPERATURE: 97.4 F | SYSTOLIC BLOOD PRESSURE: 130 MMHG | BODY MASS INDEX: 29.1 KG/M2 | OXYGEN SATURATION: 98 %

## 2021-06-04 VITALS
HEART RATE: 82 BPM | WEIGHT: 210 LBS | SYSTOLIC BLOOD PRESSURE: 138 MMHG | OXYGEN SATURATION: 98 % | DIASTOLIC BLOOD PRESSURE: 68 MMHG | BODY MASS INDEX: 30.13 KG/M2

## 2021-06-04 VITALS
HEART RATE: 64 BPM | HEIGHT: 70 IN | BODY MASS INDEX: 28.92 KG/M2 | WEIGHT: 202 LBS | SYSTOLIC BLOOD PRESSURE: 138 MMHG | OXYGEN SATURATION: 98 % | DIASTOLIC BLOOD PRESSURE: 84 MMHG

## 2021-06-05 VITALS
OXYGEN SATURATION: 98 % | HEART RATE: 74 BPM | DIASTOLIC BLOOD PRESSURE: 66 MMHG | BODY MASS INDEX: 30.4 KG/M2 | TEMPERATURE: 97.5 F | SYSTOLIC BLOOD PRESSURE: 128 MMHG | WEIGHT: 211.9 LBS

## 2021-06-05 NOTE — TELEPHONE ENCOUNTER
RN cannot approve Refill Request    RN can NOT refill this medication Protocol failed and NO refill given.      Beti Perez, [facility] Triage/Med Refill 1/16/2020    Requested Prescriptions   Pending Prescriptions Disp Refills     levothyroxine (SYNTHROID, LEVOTHROID) 75 MCG tablet [Pharmacy Med Name: LEVOTHYROXINE 75 MCG TABLET] 90 tablet 3     Sig: TAKE 1 TABLET BY MOUTH EVERY DAY       Thyroid Hormones Protocol Failed - 1/14/2020 10:35 AM        Failed - TSH on file in past 12 months for patient age 12 & older     TSH   Date Value Ref Range Status   08/15/2018 3.43 0.30 - 5.00 uIU/mL Final                   Passed - Provider visit in past 12 months or next 3 months     Last office visit with prescriber/PCP: 7/1/2019 Memo Martinez MD OR same dept: 7/1/2019 Memo Martinez MD OR same specialty: 7/1/2019 Memo Martinez MD  Last physical: 12/31/2018 Last MTM visit: Visit date not found   Next visit within 3 mo: Visit date not found  Next physical within 3 mo: Visit date not found  Prescriber OR PCP: Memo Martinez MD  Last diagnosis associated with med order: 1. Essential hypertension  - amLODIPine (NORVASC) 5 MG tablet; TAKE 1 TABLET BY MOUTH EVERY DAY  Dispense: 90 tablet; Refill: 1    2. Hypothyroidism due to acquired atrophy of thyroid  - levothyroxine (SYNTHROID, LEVOTHROID) 75 MCG tablet [Pharmacy Med Name: LEVOTHYROXINE 75 MCG TABLET]; TAKE 1 TABLET BY MOUTH EVERY DAY  Dispense: 90 tablet; Refill: 3    If protocol passes may refill for 12 months if within 3 months of last provider visit (or a total of 15 months).           Signed Prescriptions Disp Refills    amLODIPine (NORVASC) 5 MG tablet 90 tablet 1     Sig: TAKE 1 TABLET BY MOUTH EVERY DAY       Calcium-Channel Blockers Protocol Passed - 1/14/2020 10:35 AM        Passed - PCP or prescribing provider visit in past 12 months or next 3 months     Last office visit with prescriber/PCP: 7/1/2019 Memo Martinez MD OR same dept:  7/1/2019 Memo Martinez MD OR same specialty: 7/1/2019 Memo Martinez MD  Last physical: 12/31/2018 Last MTM visit: Visit date not found   Next visit within 3 mo: Visit date not found  Next physical within 3 mo: Visit date not found  Prescriber OR PCP: Memo Martinez MD  Last diagnosis associated with med order: 1. Essential hypertension  - amLODIPine (NORVASC) 5 MG tablet; TAKE 1 TABLET BY MOUTH EVERY DAY  Dispense: 90 tablet; Refill: 1    2. Hypothyroidism due to acquired atrophy of thyroid  - levothyroxine (SYNTHROID, LEVOTHROID) 75 MCG tablet [Pharmacy Med Name: LEVOTHYROXINE 75 MCG TABLET]; TAKE 1 TABLET BY MOUTH EVERY DAY  Dispense: 90 tablet; Refill: 3    If protocol passes may refill for 12 months if within 3 months of last provider visit (or a total of 15 months).             Passed - Blood pressure filed in past 12 months     BP Readings from Last 1 Encounters:   12/10/19 130/72

## 2021-06-05 NOTE — TELEPHONE ENCOUNTER
Refill Approved    Rx renewed per Medication Renewal Policy. Medication was last renewed on 11/9/18.    Beti Perez, [facility] Triage/Med Refill 1/16/2020     Requested Prescriptions   Pending Prescriptions Disp Refills     amLODIPine (NORVASC) 5 MG tablet [Pharmacy Med Name: AMLODIPINE BESYLATE 5 MG TAB] 90 tablet 3     Sig: TAKE 1 TABLET BY MOUTH EVERY DAY       Calcium-Channel Blockers Protocol Passed - 1/14/2020 10:35 AM        Passed - PCP or prescribing provider visit in past 12 months or next 3 months     Last office visit with prescriber/PCP: 7/1/2019 Memo Martinez MD OR same dept: 7/1/2019 Memo Martinez MD OR same specialty: 7/1/2019 Memo Martinez MD  Last physical: 12/31/2018 Last MTM visit: Visit date not found   Next visit within 3 mo: Visit date not found  Next physical within 3 mo: Visit date not found  Prescriber OR PCP: Memo Martinez MD  Last diagnosis associated with med order: 1. Essential hypertension  - amLODIPine (NORVASC) 5 MG tablet [Pharmacy Med Name: AMLODIPINE BESYLATE 5 MG TAB]; TAKE 1 TABLET BY MOUTH EVERY DAY  Dispense: 90 tablet; Refill: 3    2. Hypothyroidism due to acquired atrophy of thyroid  - levothyroxine (SYNTHROID, LEVOTHROID) 75 MCG tablet [Pharmacy Med Name: LEVOTHYROXINE 75 MCG TABLET]; TAKE 1 TABLET BY MOUTH EVERY DAY  Dispense: 90 tablet; Refill: 3    If protocol passes may refill for 12 months if within 3 months of last provider visit (or a total of 15 months).             Passed - Blood pressure filed in past 12 months     BP Readings from Last 1 Encounters:   12/10/19 130/72             levothyroxine (SYNTHROID, LEVOTHROID) 75 MCG tablet [Pharmacy Med Name: LEVOTHYROXINE 75 MCG TABLET] 90 tablet 3     Sig: TAKE 1 TABLET BY MOUTH EVERY DAY       Thyroid Hormones Protocol Failed - 1/14/2020 10:35 AM        Failed - TSH on file in past 12 months for patient age 12 & older     TSH   Date Value Ref Range Status   08/15/2018 3.43 0.30 - 5.00 uIU/mL  Final                   Passed - Provider visit in past 12 months or next 3 months     Last office visit with prescriber/PCP: 7/1/2019 Memo Martinez MD OR same dept: 7/1/2019 Memo Martinez MD OR same specialty: 7/1/2019 Memo Martinez MD  Last physical: 12/31/2018 Last MTM visit: Visit date not found   Next visit within 3 mo: Visit date not found  Next physical within 3 mo: Visit date not found  Prescriber OR PCP: Memo Martinez MD  Last diagnosis associated with med order: 1. Essential hypertension  - amLODIPine (NORVASC) 5 MG tablet [Pharmacy Med Name: AMLODIPINE BESYLATE 5 MG TAB]; TAKE 1 TABLET BY MOUTH EVERY DAY  Dispense: 90 tablet; Refill: 3    2. Hypothyroidism due to acquired atrophy of thyroid  - levothyroxine (SYNTHROID, LEVOTHROID) 75 MCG tablet [Pharmacy Med Name: LEVOTHYROXINE 75 MCG TABLET]; TAKE 1 TABLET BY MOUTH EVERY DAY  Dispense: 90 tablet; Refill: 3    If protocol passes may refill for 12 months if within 3 months of last provider visit (or a total of 15 months).

## 2021-06-05 NOTE — PROGRESS NOTES
Assessment and Plan:   79-year-old man in for annual wellness visit and exam.    1. Routine general medical examination at a health care facility  Stable examination today.  - Urinalysis-UC if Indicated    2. Mixed hyperlipidemia  No angina TIAs or claudication.  - Lipid Cascade    3. Hypothyroidism due to acquired atrophy of thyroid  Takes his thyroid daily.  We will check a TSH.  - Thyroid Stimulating Hormone (TSH)    4. Status post total knee replacement, right  Right knee was done about 1 year ago.  Left knee about 7 years ago.    5. Head and neck cancer (H)  This was treated back in 2011.  No recurrence.    6. Medication management  No trouble with current medications  - HM1(CBC and Differential)  - Comprehensive Metabolic Panel  - HM1 (CBC with Diff)    7. Essential hypertension  Today's blood pressure 138/86.  He should restart his amlodipine.  Continues to take the losartan 50 mg.        The patient's current medical problems were reviewed.    I have had an Advance Directives discussion with the patient.  The following health maintenance schedule was reviewed with the patient and provided in printed form in the after visit summary:   Health Maintenance   Topic Date Due     MEDICARE ANNUAL WELLNESS VISIT  01/15/2019     FALL RISK ASSESSMENT  12/05/2019     ZOSTER VACCINES (3 of 3) 03/21/2019     TD 18+ HE  08/10/2022     LIPID  07/01/2024     ADVANCE CARE PLANNING  10/14/2024     PNEUMOCOCCAL IMMUNIZATION 65+ LOW/MEDIUM RISK  Completed     INFLUENZA VACCINE RULE BASED  Completed        Subjective:   Chief Complaint: Michael Maldonado is an 79 y.o. male here for an Annual Wellness visit.   HPI: Stents of system review done.  Basically negative except for the following  Some issues with poor balance after knee replacements.  Decreased hearing in the right ear.  Needs cataract surgery sometime in the future.  Specifically denies chest pain shortness of breath or any orthopnea.  Denies dysphasia or any change in  bowel habits.  Nocturia x1 no hematuria or stones.  No epilepsy seizures neuropathy stroke etc.  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  Sees a dermatologist on a regular basis.  He has had some Mohs surgeries because of skin cancers in the past.      Review of Systems:    Please see above.  The rest of the review of systems are negative for all systems.    Patient Care Team:  Memo Martinez MD as PCP - General (Internal Medicine)  Memo Martinez MD as Assigned PCP  Elke Rico PharmD as Pharmacist (Pharmacist)     Patient Active Problem List   Diagnosis     Hyperlipidemia     Head and neck cancer (H)     Hypertension     Leukopenia     Hypothyroidism due to acquired atrophy of thyroid     History of deep vein thrombophlebitis of lower extremity     Status post total knee replacement, right     Exertional dyspnea     Orthostatic dizziness     Anxiety     Past Medical History:   Diagnosis Date     BPH (benign prostatic hyperplasia)      Hyperlipidemia      Hypertension      Leukopenia     mild     Osteoarthritis      Stroke (H)       Past Surgical History:   Procedure Laterality Date     CRANIOTOMY FOR AVM  1970    clipped     JOINT REPLACEMENT       MO TOTAL KNEE ARTHROPLASTY Right 1/7/2019    Procedure: RIGHT TOTAL KNEE  ARTHROPLASTY;  Surgeon: Darrel Page DO;  Location: [facility] Main OR;  Service: Orthopedics     SALIVARY GLAND SURGERY Right 2011    squamous cell cancer grade 3     TOTAL KNEE ARTHROPLASTY Left 2013      Family History   Problem Relation Age of Onset     Cirrhosis Father      Diabetes Father       Social History     Socioeconomic History     Marital status:      Spouse name: Not on file     Number of children: Not on file     Years of education: Not on file     Highest education level: Not on file   Occupational History     Not on file   Social Needs     Financial resource strain: Not on file     Food insecurity:     Worry: Not on file     Inability: Not on  file     Transportation needs:     Medical: Not on file     Non-medical: Not on file   Tobacco Use     Smoking status: Former Smoker     Types: Cigars     Last attempt to quit: 2000     Years since quittin.0     Smokeless tobacco: Never Used   Substance and Sexual Activity     Alcohol use: Yes     Alcohol/week: 1.0 standard drinks     Types: 1 Shots of liquor per week     Comment: nightly     Drug use: No     Sexual activity: Not on file   Lifestyle     Physical activity:     Days per week: Not on file     Minutes per session: Not on file     Stress: Not on file   Relationships     Social connections:     Talks on phone: Not on file     Gets together: Not on file     Attends Voodoo service: Not on file     Active member of club or organization: Not on file     Attends meetings of clubs or organizations: Not on file     Relationship status: Not on file     Intimate partner violence:     Fear of current or ex partner: Not on file     Emotionally abused: Not on file     Physically abused: Not on file     Forced sexual activity: Not on file   Other Topics Concern     Not on file   Social History Narrative    , retired [employer]      Current Outpatient Medications   Medication Sig Dispense Refill     amLODIPine (NORVASC) 5 MG tablet TAKE 1 TABLET BY MOUTH EVERY DAY 90 tablet 1     carboxymethylcellulose-glycerin (REFRESH OPTIVE) 0.5-0.9 % Drop Administer 1 drop to both eyes 2 (two) times a day as needed.       levothyroxine (SYNTHROID, LEVOTHROID) 75 MCG tablet TAKE 1 TABLET BY MOUTH EVERY DAY 90 tablet 3     losartan (COZAAR) 50 MG tablet TAKE 1 TABLET BY MOUTH EVERY DAY 90 tablet 1     omeprazole (PRILOSEC) 20 MG capsule Take 20 mg by mouth 2 (two) times a day before meals.       omeprazole (PRILOSEC) 20 MG capsule TAKE 1 CAPSULE BY MOUTH EVERY DAY 90 capsule 3     simvastatin (ZOCOR) 40 MG tablet Take 1 tablet (40 mg total) by mouth every evening. 90 tablet 3     No current facility-administered  medications for this visit.       Objective:   Vital Signs: There were no vitals taken for this visit.     VisionScreening:  No exam data present     PHYSICAL EXAM  Pleasant man who is somewhat hard of hearing from the right ear.  He has had extensive surgery in the right side of his neck because of head and neck cancer.  Therefore decreased hearing on that side.  Blood pressure 138/86.  Pulse 64 oxygen saturation is 98%.  EYES: Eyelids, conjunctiva, and sclera were normal. Pupils were normal. Cornea, iris, and lens were normal bilaterally.  Bilateral cataracts.  HEAD, EARS, NOSE, MOUTH, AND THROAT: Head and face were normal. Hearing was normal to voice and the ears were normal to external exam. Nose appearance was normal and there was no discharge. Oropharynx was normal.  Significant deformity right side of the ear and neck from previous head neck cancer surgery.  No evidence of any recurrence.  NECK: Neck appearance was normal. There were no neck masses and the thyroid was not enlarged.  No bruits.  RESPIRATORY: Breathing pattern was normal and the chest moved symmetrically.  Percussion/auscultatory percussion was normal.  Lung sounds were normal and there were no abnormal sounds.  CARDIOVASCULAR: Heart rate and rhythm were normal.  S1 and S2 were normal and there were no extra sounds or murmurs. Peripheral pulses in arms and legs were normal.  Jugular venous pressure was normal.  There was no peripheral edema.  GASTROINTESTINAL: The abdomen was normal in contour.  Bowel sounds were present.  Percussion detected no organ enlargement or tenderness.  Palpation detected no tenderness, mass, or enlarged organs.   MUSCULOSKELETAL: Skeletal configuration was normal and muscle mass was normal for age. Joint appearance was overall normal.  OA changes.  Bilateral knee replacements in the past.  LYMPHATIC: There were no enlarged nodes.  SKIN/HAIR/NAILS: Skin color was normal.  There were no skin lesions.  Hair and nails  were normal.  Lots of sun damaged skin.  No obvious cancers.  NEUROLOGIC: The patient was alert and oriented to person, place, time, and circumstance. Speech was normal. Cranial nerves were normal. Motor strength was normal for age. The patient was normally coordinated.  PSYCHIATRIC:  Mood and affect were normal and the patient had normal recent and remote memory. The patient's judgment and insight were normal.    ADDITIONAL VITAL SIGNS: Oxygen saturation 98%  CHEST WALL/BREASTS: Normal male  RECTAL: Deferred today.  GENITAL/URINARY: Normal male        Assessment Results 1/15/2018   Activities of Daily Living No help needed   Instrumental Activities of Daily Living No help needed   Get Up and Go Score Less than 12 seconds   Mini Cog Total Score 0   Some recent data might be hidden     A Mini-Cog score of 0-2 suggests the possibility of dementia, score of 3-5 suggests no dementia    Identified Health Risks:     The patient reports that he drinks more than one alcoholic drink per day but denies binge or excessive drinking. He was counseled and given information about possible harmful effects of excessive alcohol intake.  The patient was counseled and encouraged to consider modifying their diet and eating habits. He was provided with information on recommended healthy diet options.  Patient's advanced directive was discussed and I am comfortable with the patient's wishes.        The patient was provided with appropriate referrals to address his memory problem.

## 2021-06-05 NOTE — PROGRESS NOTES
Preoperative Exam    Scheduled Procedure: Phaco IOL R eye 2/13/20 NS l EYE 3/5/20  Surgery Date:  See above  Surgery Location: [facility], [place], fax [phone]    Surgeon:      Assessment/Plan:     1. Preop general physical exam  His examination is satisfactory today.  He is scheduled for cataract surgery in a week.  He has no symptoms or signs of unstable cardiac or lung disease.  No history of difficulty with anesthesia and no recent symptoms of infection.  We will get an EKG today.  He had blood work done couple weeks ago that was satisfactory.  - Electrocardiogram Perform - Clinic    2. Essential hypertension  Blood pressure is well controlled.    3. Head and neck cancer (H)  He has no history of recurrence.    Surgical Procedure Risk: Low (reported cardiac risk generally < 1%)  Have you had prior anesthesia?: Yes  Have you or any family members had a previous anesthesia reaction:  No  Do you or any family members have a history of a clotting or bleeding disorder?: No  Cardiac Risk Assessment: no increased risk for major cardiac complications    Pending review of diagnostic evaluation, APPROVAL GIVEN to proceed with proposed procedure, without further diagnostic evaluation.        Functional Status: Independent  Patient plans to recover at home with family.     Subjective:      Michael Maldonado is a 79 y.o. male who presents for a preoperative consultation.  For bilateral cataracts     All other systems reviewed and are negative, other than those listed in the HPI.    Pertinent History  Do you have difficulty breathing or chest pain after walking up a flight of stairs: No  History of obstructive sleep apnea: No  Steroid use in the last 6 months: No  Frequent Aspirin/NSAID use:ASA 81mg daily   Prior Blood Transfusion: No  Prior Blood Transfusion Reaction: No  If for some reason prior to, during or after the procedure, if it is medically indicated, would you be willing to have a blood transfusion?:   There is no transfusion refusal.    Current Outpatient Medications   Medication Sig Dispense Refill     amLODIPine (NORVASC) 5 MG tablet TAKE 1 TABLET BY MOUTH EVERY DAY 90 tablet 1     carboxymethylcellulose-glycerin (REFRESH OPTIVE) 0.5-0.9 % Drop Administer 1 drop to both eyes 2 (two) times a day as needed.       levothyroxine (SYNTHROID, LEVOTHROID) 75 MCG tablet TAKE 1 TABLET BY MOUTH EVERY DAY 90 tablet 3     losartan (COZAAR) 50 MG tablet TAKE 1 TABLET BY MOUTH EVERY DAY 90 tablet 1     omeprazole (PRILOSEC) 20 MG capsule Take 20 mg by mouth 2 (two) times a day before meals.       omeprazole (PRILOSEC) 20 MG capsule TAKE 1 CAPSULE BY MOUTH EVERY DAY 90 capsule 3     simvastatin (ZOCOR) 40 MG tablet Take 1 tablet (40 mg total) by mouth every evening. 90 tablet 3     No current facility-administered medications for this visit.         No Known Allergies    Patient Active Problem List   Diagnosis     Hyperlipidemia     Head and neck cancer (H)     Hypertension     Leukopenia     Hypothyroidism due to acquired atrophy of thyroid     History of deep vein thrombophlebitis of lower extremity     Status post total knee replacement, right     Exertional dyspnea     Orthostatic dizziness     Anxiety       Past Medical History:   Diagnosis Date     BPH (benign prostatic hyperplasia)      Hyperlipidemia      Hypertension      Leukopenia     mild     Osteoarthritis      Stroke (H)        Past Surgical History:   Procedure Laterality Date     CRANIOTOMY FOR AVM  1970    clipped     JOINT REPLACEMENT       NH TOTAL KNEE ARTHROPLASTY Right 1/7/2019    Procedure: RIGHT TOTAL KNEE  ARTHROPLASTY;  Surgeon: Darrel Page DO;  Location: [facility];  Service: Orthopedics     SALIVARY GLAND SURGERY Right 2011    squamous cell cancer grade 3     TOTAL KNEE ARTHROPLASTY Left 2013       Social History     Socioeconomic History     Marital status:      Spouse name: Not on file     Number of children: Not on file      Years of education: Not on file     Highest education level: Not on file   Occupational History     Not on file   Social Needs     Financial resource strain: Not on file     Food insecurity:     Worry: Not on file     Inability: Not on file     Transportation needs:     Medical: Not on file     Non-medical: Not on file   Tobacco Use     Smoking status: Former Smoker     Types: Cigars     Last attempt to quit: 2000     Years since quittin.1     Smokeless tobacco: Never Used   Substance and Sexual Activity     Alcohol use: Yes     Alcohol/week: 1.0 standard drinks     Types: 1 Shots of liquor per week     Comment: nightly     Drug use: No     Sexual activity: Not on file   Lifestyle     Physical activity:     Days per week: Not on file     Minutes per session: Not on file     Stress: Not on file   Relationships     Social connections:     Talks on phone: Not on file     Gets together: Not on file     Attends Jewish service: Not on file     Active member of club or organization: Not on file     Attends meetings of clubs or organizations: Not on file     Relationship status: Not on file     Intimate partner violence:     Fear of current or ex partner: Not on file     Emotionally abused: Not on file     Physically abused: Not on file     Forced sexual activity: Not on file   Other Topics Concern     Not on file   Social History Narrative    , retired [employer]       Patient Care Team:  Memo Martinez MD as PCP - General (Internal Medicine)  Memo Martinez MD as Assigned PCP  Elke Rico, PharmD as Pharmacist (Pharmacist)          Objective:     There were no vitals filed for this visit.      Physical Exam:  General:  Patient is alert and in no apparent distress.  Neck:  Supple with no adenopathy or thyroid abnormality noted.  Cardiovascular:  Regular rate and rhythm, normal S1/S2, no murmurs, rubs, or gallop.  Pulmonary:  Lungs are clear to auscultation bilaterally with normal respiratory  effort.  Extremities:  No LE edema.    Neurologic Cranial nerves are intact.  No focal deficits.  Psychiatric:  Pleasant, no confusion or agitation   Skin:  Warm and well perfused with no rashes or abnormalities.        There are no Patient Instructions on file for this visit.    EKG: Pending    Labs:  No labs were ordered during this visit as they were stable in January.    Immunization History   Administered Date(s) Administered     Influenza high dose,seasonal,PF, 65+ yrs 1941, 09/27/2016, 09/26/2018, 09/24/2019     Influenza, inj, historic,unspecified 09/21/2015, 10/11/2017     Pneumo Conj 13-V (2010&after) 05/20/2015     Pneumo Polysac 23-V 01/01/2007, 08/06/2010     Td,adult,historic,unspecified 01/01/2008     Tdap 08/10/2012     ZOSTER, LIVE 04/25/2012     ZOSTER, RECOMBINANT, IM 01/24/2019           Electronically signed by Justa Salinas MD 02/06/20 2:05 PM

## 2021-06-06 NOTE — TELEPHONE ENCOUNTER
Dr. Martinez,  Spoke with the patient's wife, Wendy, who verified that he is taking 50 mg losartan.  Refill has been set up for you to review.  Anyi KEARNS, UZIEL/SHILO....................1:53 PM

## 2021-06-06 NOTE — TELEPHONE ENCOUNTER
Medication Request  Medication name: Losartan 25 MG  Requested Pharmacy: [id number]  Reason for request: New prescription  When did you use medication last?:  Not provided  Patient offered appointment:  N/A - electronic request  Okay to leave a detailed message: yes  [phone]    FYI: This medication dose is not listed on the patient's current medication list.

## 2021-06-07 ENCOUNTER — RECORDS - HEALTHEAST (OUTPATIENT)
Dept: ADMINISTRATIVE | Facility: OTHER | Age: 81
End: 2021-06-07

## 2021-06-08 NOTE — TELEPHONE ENCOUNTER
Refill Approved    Rx renewed per Medication Renewal Policy. Medication was last renewed on 5/20/19.    Beti Perez, [facility] Triage/Med Refill 5/7/2020     Requested Prescriptions   Pending Prescriptions Disp Refills     simvastatin (ZOCOR) 40 MG tablet [Pharmacy Med Name: SIMVASTATIN 40 MG TABLET] 90 tablet 3     Sig: TAKE 1 TABLET BY MOUTH EVERY DAY IN THE EVENING       Statins Refill Protocol (Hmg CoA Reductase Inhibitors) Passed - 5/5/2020 12:33 AM        Passed - PCP or prescribing provider visit in past 12 months      Last office visit with prescriber/PCP: 7/1/2019 Memo Martinez MD OR same dept: 7/1/2019 Memo Martinez MD OR same specialty: 7/1/2019 Memo Martinez MD  Last physical: 1/22/2020 Last MTM visit: Visit date not found   Next visit within 3 mo: Visit date not found  Next physical within 3 mo: Visit date not found  Prescriber OR PCP: Memo Martinez MD  Last diagnosis associated with med order: 1. ASHD (arteriosclerotic heart disease)  - simvastatin (ZOCOR) 40 MG tablet [Pharmacy Med Name: SIMVASTATIN 40 MG TABLET]; TAKE 1 TABLET BY MOUTH EVERY DAY IN THE EVENING  Dispense: 90 tablet; Refill: 3    If protocol passes may refill for 12 months if within 3 months of last provider visit (or a total of 15 months).

## 2021-06-10 NOTE — TELEPHONE ENCOUNTER
New Appointment Needed  What is the reason for the visit:    Pre-Op Appt Request  When is the surgery? :  9/24/20  Where is the surgery?:   [facility]   Who is the surgeon? :  Dr. Lantigua  What type of surgery is being done?: Cataracts Surgery   Provider Preference: PCP only  How soon do you need to be seen?: next week  Waitlist offered?: No  Okay to leave a detailed message:  Yes

## 2021-06-10 NOTE — PROGRESS NOTES
"  Office Visit - Follow Up   Michael Maldonado   76 y.o. male    Date of Visit: 4/25/2017    Chief Complaint   Patient presents with     Cough     Cough, nasal congestion, hoarse voice, couldn't talk yesterday, started 4 days ago        Assessment and Plan   1. Hepatitis B  Positive for hepatitis B antibody after blood transfusion many years ago. Will check and be sure there's no active hepatitis B with  Testing below.  - Hepatitis B Surface Antigen (HBsAG)  - Hepatitis B Core Antibody (Anti-HBc)    2. Acute bronchitis   Will treat with azithromycin.           No Follow-up on file.     History of Present Illness   This 76 y.o. old  Reports that four days ago she woke up with congestion in his head. His nose is congested. He was hoarse. Denies any fever reports no chills.  He's now developed a cough productive of thick yellow phlegm.   He recently had a hepatitis B antibody test done as he was told he had exposure to hepatitis B from a blood transfusion about 40 years ago. This test came back positive indicating that he's immune to hepatitis B or remotely possible that he is active hepatitis B.  We discussed getting the above testing to complete his evaluation.    Review of Systems: A comprehensive review of systems was negative except as noted.     Medications, Allergies and Problem List   Reviewed and updated     Physical Exam   General Appearance:       /80 (Patient Site: Right Arm, Patient Position: Sitting)  Pulse 75  Ht 5' 9\" (1.753 m)  Wt 205 lb (93 kg)  SpO2 95%  BMI 30.27 kg/m2     His lungs sound clear. Neck shows no adenopathy. Sinuses are nontender. Mouth and throat are negative.     Additional Information   Current Outpatient Prescriptions   Medication Sig Dispense Refill     aspirin 81 MG EC tablet Take 81 mg by mouth daily.       lisinopril (PRINIVIL,ZESTRIL) 10 MG tablet Take 10 mg by mouth daily.       omeprazole (PRILOSEC) 20 MG capsule Take 20 mg by mouth daily.       simvastatin (ZOCOR) 20 " MG tablet TAKE 1 TABLET BY MOUTH EVERY NIGHT AT BEDTIME 90 tablet 3     azithromycin (ZITHROMAX) 250 MG tablet Take 2 tablets by mouth day one and 1 tablet by mouth days 2-5 6 tablet 0     No current facility-administered medications for this visit.      No Known Allergies  Social History   Substance Use Topics     Smoking status: Former Smoker     Types: Cigars     Quit date: 2000     Smokeless tobacco: Never Used     Alcohol use None       Review and/or order of clinical lab tests:  Review and/or order of radiology tests:  Review and/or order of medicine tests:  Discussion of test results with performing physician:  Decision to obtain old records and/or obtain history from someone other than the patient:  Review and summarization of old records and/or obtaining history from someone other than the patient and.or discussion of case with another health care provider:  Independent visualization of image, tracing or specimen itself:    Time: total time spent with the patient was 15 minutes of which >50% was spent in counseling and coordination of care     Dustin Patel MD

## 2021-06-11 NOTE — TELEPHONE ENCOUNTER
Who is calling:    Spouse.    Reason for Call:    Caller is requesting labs from 08/31/2020 be mailed to his home.    Date of last appointment with primary care: 08/31/2020    Okay to leave a detailed message: Yes

## 2021-06-11 NOTE — PROGRESS NOTES
[facility]  [address]  [facility] [address]  Dept: [phone]  Dept Fax: [phone]  Primary Provider: Memo Martinez MD  Pre-op Performing Provider: ADONIS DE LA O    PREOPERATIVE EVALUATION:  Today's date: 8/31/2020    Michael Maldonado is a 80 y.o. male who presents for a preoperative evaluation.    Surgical Information:  Surgery Details 8/31/2020   Surgery/Procedure: Cataract Surgery   Surgery Location: [facility]   Surgeon: Dr. Casper   Surgery Date: 9/24/20   Time of Surgery: Unknown   Where patient plans to recover: at home with family     Fax number for surgical facility: FAX: [phone]  Type of Anesthesia Anticipated: likely local    Subjective   HPI related to upcoming procedure: Patient of Dr. Martinez. Denies any bleeding.  No chest pain, fever, sweats or chills.  Denies dysuria.  No complaints today and is tolerating all his medications.    Preop Questions 8/31/2020   Have you ever had a heart attack or stroke? No   Have you ever had surgery on your heart or blood vessels, such as a stent placement, a coronary artery bypass, or surgery on an artery in your head, neck, heart, or legs? 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 No   Do you have a history of  heart failure? No   Do you currently have a cold, bronchitis or symptoms of other infection? No   Do you have a cough, shortness of breath, or wheezing? No   Do you or anyone in your family have previous history of blood clots? No   Do you or does anyone in your family have a serious bleeding problem such as prolonged bleeding following surgeries or cuts? No   Have you ever had problems with anemia or been told to take iron pills? No   Have you had any abnormal blood loss such as black, tarry or bloody stools? No   Have you ever had a blood transfusion? YES - yes, 50 years ago   Have you ever had a transfusion  reaction? No   Are you willing to have a blood transfusion if it is medically needed before, during, or after your surgery? Yes   Have you or any of your relatives ever had problems with anesthesia? No   Do you have sleep apnea, excessive snoring or daytime drowsiness? No   Do you have any artifical heart valves or other implanted medical devices like a pacemaker, defibrillator, or continuous glucose monitor? No   Do you have artificial joints? YES - bilateral knee replacement   Are you allergic to latex? No     RX monitoring program ([place]) reviewed:  not reviewed due to website not working    HYPERLIPIDEMIA - Patient has a long history of significant Hyperlipidemia requiring medication for treatment with recent good control. Patient reports no problems or side effects with the medication.     HYPERTENSION - Patient has longstanding history of HTN , currently denies any symptoms referable to elevated blood pressure. Specifically denies chest pain, palpitations, dyspnea, orthopnea, PND or peripheral edema. Blood pressure readings have been in normal range. Current medication regimen is as listed below. Patient denies any side effects of medication.     HYPOTHYROIDISM - Patient has a longstanding history of chronic Hypothyroidism. Patient has been doing well, noting no tremor, insomnia, hair loss or changes in skin texture. Continues to take medications as directed, without adverse reactions or side effects. Last TSH   Lab Results   Component Value Date    TSH 5.00 01/22/2020   .      RENAL INSUFFICIENCY - Patient has a longstanding history of moderate-severe chronic renal insufficiency. 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     History of deep vein thrombophlebitis of lower extremity 11/09/2018     Hypothyroidism due to acquired atrophy of thyroid 04/11/2018     Hypertension 04/25/2017     Hyperlipidemia 04/12/2016     Head and neck cancer (H) 04/12/2016     Leukopenia 11/17/2010     Past Medical History:   Diagnosis Date     BPH (benign prostatic hyperplasia)      Hyperlipidemia      Hypertension      Leukopenia     mild     Osteoarthritis      Stroke (H)      Past Surgical History:   Procedure Laterality Date     CRANIOTOMY FOR AVM  1970    clipped     JOINT REPLACEMENT       MT TOTAL KNEE ARTHROPLASTY Right 1/7/2019    Procedure: RIGHT TOTAL KNEE  ARTHROPLASTY;  Surgeon: Darrel Page DO;  Location: [facility];  Service: Orthopedics     SALIVARY GLAND SURGERY Right 2011    squamous cell cancer grade 3     TOTAL KNEE ARTHROPLASTY Left 2013     Current Outpatient Medications   Medication Sig Dispense Refill     amLODIPine (NORVASC) 5 MG tablet Take 1 tablet (5 mg total) by mouth daily. 90 tablet 3     aspirin 81 MG EC tablet Take 81 mg by mouth daily.       carboxymethylcellulose-glycerin (REFRESH OPTIVE) 0.5-0.9 % Drop Administer 1 drop to both eyes 2 (two) times a day as needed.       levothyroxine (SYNTHROID, LEVOTHROID) 75 MCG tablet TAKE 1 TABLET BY MOUTH EVERY DAY 90 tablet 3     losartan (COZAAR) 50 MG tablet Take 1 tablet (50 mg total) by mouth daily. 90 tablet 1     omeprazole (PRILOSEC) 20 MG capsule TAKE 1 CAPSULE BY MOUTH EVERY DAY (Patient taking differently: Take 20 mg by mouth 2 (two) times a day before meals. ) 90 capsule 3     simvastatin (ZOCOR) 40 MG tablet TAKE 1 TABLET BY MOUTH EVERY DAY IN THE EVENING 90 tablet 2     No current facility-administered medications for this visit.        No Known Allergies    Social History     Tobacco Use     Smoking status: Former Smoker     Types: Cigars     Last  attempt to quit: 2000     Years since quittin.6     Smokeless tobacco: Never Used   Substance Use Topics     Alcohol use: Yes     Alcohol/week: 1.0 standard drinks     Types: 1 Shots of liquor per week     Comment: nightly      Family History   Problem Relation Age of Onset     Cirrhosis Father      Diabetes Father      Social History     Substance and Sexual Activity   Drug Use No           Objective   /68 (Patient Site: Left Arm, Patient Position: Sitting, Cuff Size: Adult Regular)   Pulse 82   Wt 210 lb (95.3 kg)   SpO2 98%   BMI 30.13 kg/m    Physical Exam  GENERAL APPEARANCE: healthy, alert and no distress  RESP: lungs clear to auscultation - no rales, rhonchi or wheezes  CV: regular rate and rhythm, normal S1 S2, no S3 or S4 and no murmur, click or rub  ABDOMEN: soft, nontender, no HSM or masses and bowel sounds normal  NEURO: Normal strength and tone, sensory exam grossly normal, mentation intact and speech normal    Recent Labs   Lab Test 20  1145 20  0802 19  0822 19  1545 01/10/19  0600 19  0615   HGB  --  15.3  --  11.9*  --  11.4*    128*  --  250  --   --    INR  --   --   --   --  1.46* 1.41*   NA  --  142 143 136  --   --    K  --  4.2 4.2 4.4  --   --    CREATININE 1.34* 1.14 1.15 1.28  --  1.01      PRE-OP Diagnostics:   Labs pending at this time. Results will be reviewed when available.  No EKG required for low risk surgery (cataract, skin procedure, breast biopsy, etc).     Assessment & Plan     The proposed surgical procedure is considered LOW risk.    REVISED CARDIAC RISK INDEX   The patient has the following serious cardiovascular risks for perioperative complications:  No serious cardiac risks = 0 points    INTERPRETATION: 0 points: Class I (very low risk - 0.4% complication rate)    1. Preoperative examination  2. Essential hypertension  Hemodynamically stable and without any signs or symptoms of cardiopulmonary compromise.  Systolic blood  pressure slightly elevated today.  We will continue to take amlodipine and losartan.  Will check renal function and electrolytes today, prior to this low risk surgery  - Creatinine    3. Thrombocytopenia (H)  Unsure of etiology.  Does have periodic episodes of easy bruising.  LFTs from January 2020 unremarkable.  Platelets within normal limits today.  - Platelet Count    The patient has the following additional risks and recommendations for perioperative complications:     - No identified additional risk factors other than previously addressed     MEDICATION INSTRUCTIONS:  Patient is to take all scheduled medications on the day of surgery    RECOMMENDATION:  APPROVAL GIVEN to proceed with proposed procedure, without further diagnostic evaluation.    Return if symptoms worsen or fail to improve.    Signed Electronically by: Marshall Don MD    Copy of this evaluation report is provided to requesting physician.    Preop [facility] Preop Guidelines    Revised Cardiac Risk Index

## 2021-06-11 NOTE — PROGRESS NOTES
"OFFICE VISIT NOTE    Subjective:   Chief Complaint:  Insect Bite (tick bite on Sunday pulled it off, went to [facility] room on Monday and they removed a piece of the tick and put on abx) and Fall (had a fall on Sunday and went to [facility] and was dx'd with shoulder contusion, possible rotator cuff injury)    36-year-old man who was recently in his [facility].  He felt some irritation in the buttock region and remove the tick.  There was a reddened areas he did go the emergency room.  They felt he had ECM and was placed on doxycycline.  He has taken this for 2 days now.    Current Outpatient Prescriptions   Medication Sig     doxycycline (ADOXA) 100 MG tablet Take 100 mg by mouth.     aspirin 81 MG EC tablet Take 81 mg by mouth daily.     lisinopril (PRINIVIL,ZESTRIL) 10 MG tablet Take 10 mg by mouth daily.     omeprazole (PRILOSEC) 20 MG capsule Take 20 mg by mouth daily.     simvastatin (ZOCOR) 20 MG tablet TAKE 1 TABLET BY MOUTH EVERY NIGHT AT BEDTIME       Review of Systems:  A comprehensive review of systems is negative except for the comments above    Objective:    Pulse 69  Ht 5' 9\" (1.753 m)  Wt 199 lb (90.3 kg)  SpO2 97%  BMI 29.39 kg/m2  GENERAL: No acute distress.  He has a 2 inch diameter red somewhat target lesion on the right buttock region.  Certainly consistent with a recent tick bite.  He is already on doxycycline.    Assessment & Plan   Michael Maldonado is a 76 y.o. male.    We will check a Lyme cascade.  He should finish out 14 days of doxycycline.  Probably recheck the Lyme titer in about 2 weeks.    Diagnoses and all orders for this visit:    Tick bite, subsequent encounter  -     Lyme Antibody [place]        Memo Martinez MD  Transcription using voice recognition software, may contain typographical errors.    "

## 2021-06-12 NOTE — PROGRESS NOTES
Office Visit - Follow Up   Michael Maldonado   80 y.o. male    Date of Visit: 11/12/2020    Chief Complaint   Patient presents with     Establish Care        -------------------------------------------------------------------------------------------------------------------------  Assessment and Plan    Establishing primary care with me for this 80-year-old man who is a retired  for the [employer], and was previously working with Dr. Martinez.  Issues are obesity with body mass index of 30.4, weight has been creeping up over the course of 2020, and I encouraged him to try to pull off 25 pounds over the next 6 to 9 months which will really help with his blood pressure, take the stress off his knees, and give him more energy.  Probably too much alcohol consumption, he told me about the habit of daily martini, and he is getting excessive carbohydrate calories from that, plus alcohol adds to blood pressure.  Essential hypertension, reasonably well controlled on combination of losartan plus amlodipine.  Hypothyroidism on replacement, on stable levothyroxine dose, will check TSH level today November 12, 2020.  Hyperlipidemia on high-dose simvastatin, lipids reasonably well controlled when last checked January 2020, no history of cardiovascular events.  Reduced renal function on a metabolic panel from August 2020, will recheck on this round of testing.  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  Actinic keratoses, with lesions on his scalp that need to be examined by dermatology, sees them every 3 months.  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  History of deep venous thrombosis associated with the two thousand nineteen knee arthroplasty, no longer needs to  be on anticoagulation.  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  Up-to-date on vaccinations including seasonal flu vaccine, pneumococcus, and shingles.    He is not fasting this afternoon, so we will skip the lipid panel.  We will check a comprehensive metabolic panel, blood cell counts, and TSH thyroid test.    Going issue by issue:    Obesity with body mass index of 30.4, weight has been creeping up over the course of 2020, and I encouraged him to try to pull off 25 pounds over the next 6 to 9 months which will really help with his blood pressure, take the stress off his knees, and give him more energy.  I told him the martini probably contains two hundred fifty or three hundred carbohydrate calories per day, which adds up over the course of the week.  I predict that if he cut out the martinis, he would probably lose 5 pounds in a month and 10 pounds in 2 months.  Hopefully he will be more physically active when he gets to [place] in January.    Probably too much alcohol consumption, he told me about the habit of daily martini, and he is getting excessive carbohydrate calories from that, plus alcohol adds to blood pressure.      Essential hypertension, reasonably well controlled on combination of losartan plus amlodipine.  Today's in clinic blood pressure of 128/66 is pretty good.  We will keep his blood pressure medicines the same.  If he can lose the weight and may be reduce the martinis, perhaps he could stop one of his blood pressure medicines.    Hypothyroidism on replacement, on stable levothyroxine dose, will check TSH level today November 12, 2020.     Hyperlipidemia on high-dose simvastatin, lipids reasonably well controlled when last checked January 2020, no history of cardiovascular events.  Cholesterol panel from January 22, 2020 had total cholesterol one seventy-six, HDL a generous sixty-two, well-controlled LDL of eighty-three,  triglycerides slightly elevated one fifty-three.    Reduced renal function on a metabolic panel from August 2020, will recheck on this round of testing.  Creatinine was about 1.35, previous levels between 1.1 and 1.2.    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      Actinic keratoses, with lesions on his scalp that need to be examined by dermatology, sees them every 3 months.      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      History of deep venous thrombosis associated with the 2019 knee arthroplasty, no longer needs to be on anticoagulation.      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      Up-to-date on vaccinations including seasonal flu vaccine, pneumococcus, and shingles.    --------------------------------------------------------------------------------------------------------------------------  History of Present Illness  This 80 y.o. old Establishing primary care with me for this 80-year-old man who is a retired  for the city of [place], and was previously working with Dr. Martinez.  Issues are obesity with body mass index of 30.4, weight has been creeping up over the course of 2020, and I encouraged him to try to pull off 25 pounds over the next 6 to 9 months which will really help with his blood pressure, take the stress off his knees, and give him more energy.  Probably too much alcohol consumption, he told me about the habit of daily martini, and he is getting excessive carbohydrate calories from that, plus alcohol adds to blood pressure.  Essential hypertension, reasonably well controlled on combination of losartan plus amlodipine.  Hypothyroidism on replacement, on stable levothyroxine  dose, will check TSH level today November 12, 2020.  Hyperlipidemia on high-dose simvastatin, lipids reasonably well controlled when last checked January 2020, no history of cardiovascular events.  Reduced renal function on a metabolic panel from August 2020, will recheck on this round of testing.  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  Actinic keratoses, with lesions on his scalp that need to be examined by dermatology, sees them every 3 months.  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  History of deep venous thrombosis associated with the two thousand nineteen knee arthroplasty, no longer needs to be on anticoagulation.  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  Up-to-date on vaccinations including seasonal flu vaccine, pneumococcus, and shingles.    HYPERLIPIDEMIA -   simvastatin (ZOCOR) 40 MG tablet  No history CV events  Lab Results   Component Value Date    CHOL 176 01/22/2020    CHOL 152 07/01/2019    CHOL 181 01/15/2018     Lab Results   Component Value Date    HDL 62 01/22/2020    HDL 52 07/01/2019    HDL 53 01/15/2018     Lab Results   Component Value Date    LDLCALC 83 01/22/2020    LDLCALC 72 07/01/2019    LDLCALC 96 01/15/2018     Lab Results   Component Value Date    TRIG 153 (H) 01/22/2020    TRIG 140 07/01/2019    TRIG 160 (H) 01/15/2018     No components found for: CHOLHDL     HYPERTENSION -  losartan (COZAAR) 50 MG tablet  amLODIPine (NORVASC) 5 MG tablet  No cardiac history  BP Readings from Last 3 Encounters:   11/12/20 128/66   08/31/20 138/68   02/06/20 140/78     HYPOTHYROIDISM - Patient has a longstanding history of chronic Hypothyroidism.   levothyroxine (SYNTHROID,  LEVOTHROID) 75 MCG tablet  Lab Results   Component Value Date    TSH 5.00 01/22/2020     RENAL INSUFFICIENCY - Patient has a longstanding history of moderate-severe chronic renal insufficiency.   Lab Results   Component Value Date    CREATININE 1.34 (H) 08/31/2020    BUN 14 01/22/2020     01/22/2020    K 4.2 01/22/2020     01/22/2020    CO2 24 01/22/2020     Obesity  Wt Readings from Last 3 Encounters:   11/12/20 211 lb 14.4 oz (96.1 kg)   08/31/20 210 lb (95.3 kg)   02/06/20 206 lb (93.4 kg)     Thrombocytopenia (H)  Lab Results   Component Value Date    WBC 3.4 (L) 01/22/2020    HGB 15.3 01/22/2020    HCT 47.1 01/22/2020    MCV 95 01/22/2020     08/31/2020       Head and neck cancer (H)  SALIVARY GLAND SURGERY Right parotid gland 2011   squamous cell cancer grade 3   then XRT  He has no history of recurrence.  Sees dentist     CRANIOTOMY FOR AVM   1970     Clipped    Actinic keratoses-- sees dermalogy q 3 months    JOINT REPLACEMENTs        FL TOTAL KNEE ARTHROPLASTY Right 1/7/2019    Procedure: RIGHT TOTAL KNEE  ARTHROPLASTY;  Surgeon: Darrel Page DO;  Location: [facility] OR;  Service: Orthopedics    TOTAL KNEE ARTHROPLASTY Left 2013      History of deep vein thrombophlebitis of lower extremity    Immunization History   Administered Date(s) Administered     Influenza high dose,seasonal,PF, 65+ yrs 1941, 09/27/2016, 09/26/2018, 09/24/2019     Influenza, inj, historic,unspecified 09/21/2015, 10/11/2017     Influenza,quad,high Dose,PF, 65yr + 09/09/2020     Pneumo Conj 13-V (2010&after) 05/20/2015     Pneumo Polysac 23-V 01/01/2007, 08/06/2010     Td,adult,historic,unspecified 01/01/2008     Tdap 08/10/2012     ZOSTER, LIVE 04/25/2012     ZOSTER, RECOMBINANT, IM 01/24/2019, 09/19/2019         Wt Readings from Last 3 Encounters:   11/12/20 211 lb 14.4 oz (96.1 kg)   08/31/20 210 lb (95.3 kg)   02/06/20 206 lb (93.4 kg)     BP Readings from Last 3 Encounters:   11/12/20 128/66    20 138/68   20 140/78       Lab Results   Component Value Date    WBC 3.4 (L) 2020    HGB 15.3 2020    HCT 47.1 2020     2020    CHOL 176 2020    TRIG 153 (H) 2020    HDL 62 2020    ALT 24 2020    AST 23 2020     2020    K 4.2 2020     2020    CREATININE 1.34 (H) 2020    BUN 14 2020    CO2 24 2020    TSH 5.00 2020    PSA 1.4 01/15/2018    INR 1.46 (H) 01/10/2019        Review of Systems: A comprehensive review of systems was negative except as noted.  ---------------------------------------------------------------------------------------------------------------------------    Medications, Allergies, Social, and Problem List   Current Outpatient Medications   Medication Sig Dispense Refill     amLODIPine (NORVASC) 5 MG tablet Take 1 tablet (5 mg total) by mouth daily. 90 tablet 3     aspirin 81 MG EC tablet Take 81 mg by mouth daily.       carboxymethylcellulose-glycerin (REFRESH OPTIVE) 0.5-0.9 % Drop Administer 1 drop to both eyes 2 (two) times a day as needed.       levothyroxine (SYNTHROID, LEVOTHROID) 75 MCG tablet TAKE 1 TABLET BY MOUTH EVERY DAY 90 tablet 3     losartan (COZAAR) 50 MG tablet Take 1 tablet (50 mg total) by mouth daily. 90 tablet 1     omeprazole (PRILOSEC) 20 MG capsule TAKE 1 CAPSULE BY MOUTH EVERY DAY (Patient taking differently: Take 20 mg by mouth 2 (two) times a day before meals. ) 90 capsule 3     simvastatin (ZOCOR) 40 MG tablet TAKE 1 TABLET BY MOUTH EVERY DAY IN THE EVENING 90 tablet 2     No current facility-administered medications for this visit.      No Known Allergies  Social History     Tobacco Use     Smoking status: Former Smoker     Types: Cigars     Quit date:      Years since quittin.     Smokeless tobacco: Never Used   Substance Use Topics     Alcohol use: Yes     Alcohol/week: 1.0 standard drinks     Types: 1 Shots of liquor per week      Comment: nightly     Drug use: No     Patient Active Problem List   Diagnosis     Hyperlipidemia     Head and neck cancer (H)     Hypertension     Leukopenia     Hypothyroidism due to acquired atrophy of thyroid     History of deep vein thrombophlebitis of lower extremity     Status post total knee replacement, right     Orthostatic dizziness     Anxiety        Reviewed, reconciled and updated       Physical Exam   General Appearance:   Very pleasant, cognition quite intact, breathing comfortably, he is moving around exam is little slow, probably because of his weight.    /66 (Patient Site: Right Arm, Patient Position: Sitting, Cuff Size: Adult Regular)   Pulse 74   Temp 97.5  F (36.4  C) (Oral)   Wt 211 lb 14.4 oz (96.1 kg)   SpO2 98%   BMI 30.40 kg/m      General: Alert, in no distress  Skin: + Actinic skin changes on the scalp  + Scars from previous right parotid gland surgery, with tissue void along the angle of the right mandible  Eyes/nose/throat: Eyes without scleral icterus, eye movements normal, pupils equal and reactive, oropharynx clear,   + Bilateral hearing aids  MSK: Neck with good ROM  Lymphatic: Neck without adenopathy or masses  Endocrine: Thyroid with no nodules to palpation  Pulm: Lungs clear to auscultation bilaterally  Cardiac: Heart with regular rate and rhythm, no murmur or gallop  GI: Abdomen obese,  soft, nontender. No palpable enlargement of liver or spleen  MSK: Extremities no tenderness or edema  Neuro: Moves all extremities, without focal weakness  Psych: Alert, normal mental status. Normal affect and speech       Additional Information   I spent 45 minutes face time with the patient, with > 50% counseling, explaining and discussing with the patient the issues enumerated in the Assessment and Plan section of this note and answering questions. Afterwards, the patient was given a printout of the AVS, with attention to the content in the Patient Instruction section.        Malvin Caballero MD

## 2021-06-13 NOTE — PATIENT INSTRUCTIONS - HE
Establishing primary care with me for this 80-year-old man who is a retired  for the city of [place], and was previously working with Dr. Martinez.  Issues are obesity with body mass index of 30.4, weight has been creeping up over the course of 2020, and I encouraged him to try to pull off 25 pounds over the next 6 to 9 months which will really help with his blood pressure, take the stress off his knees, and give him more energy.  Probably too much alcohol consumption, he told me about the habit of daily martini, and he is getting excessive carbohydrate calories from that, plus alcohol adds to blood pressure.  Essential hypertension, reasonably well controlled on combination of losartan plus amlodipine.  Hypothyroidism on replacement, on stable levothyroxine dose, will check TSH level today November 12, 2020.  Hyperlipidemia on high-dose simvastatin, lipids reasonably well controlled when last checked January 2020, no history of cardiovascular events.  Reduced renal function on a metabolic panel from August 2020, will recheck on this round of testing.  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  Actinic keratoses, with lesions on his scalp that need to be examined by dermatology, sees them every 3 months.  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  History of deep venous thrombosis associated with the two thousand nineteen knee arthroplasty, no longer needs to be on anticoagulation.  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  Up-to-date on vaccinations including seasonal flu vaccine, pneumococcus, and shingles.    He is not fasting this afternoon, so we will skip the lipid panel.  We will  check a comprehensive metabolic panel, blood cell counts, and TSH thyroid test.    Going issue by issue:    Obesity with body mass index of 30.4, weight has been creeping up over the course of twenty twenty, and I encouraged him to try to pull off 25 pounds over the next 6 to 9 months which will really help with his blood pressure, take the stress off his knees, and give him more energy.  I told him the martini probably contains two hundred fifty or three hundred carbohydrate calories per day, which adds up over the course of the week.  I predict that if he cut out the martinis, he would probably lose 5 pounds in a month and 10 pounds in 2 months.  Hopefully he will be more physically active when he gets to [place] in January.    Probably too much alcohol consumption, he told me about the habit of daily martini, and he is getting excessive carbohydrate calories from that, plus alcohol adds to blood pressure.      Essential hypertension, reasonably well controlled on combination of losartan plus amlodipine.  Today's in clinic blood pressure of 128/66 is pretty good.  We will keep his blood pressure medicines the same.  If he can lose the weight and may be reduce the martinis, perhaps he could stop one of his blood pressure medicines.    Hypothyroidism on replacement, on stable levothyroxine dose, will check TSH level today November 12, 2020.     Hyperlipidemia on high-dose simvastatin, lipids reasonably well controlled when last checked January 2020, no history of cardiovascular events.  Cholesterol panel from January 22, 2020 had total cholesterol one seventy-six, HDL a generous sixty-two, well-controlled LDL of eighty-three, triglycerides slightly elevated one fifty-three.    Reduced renal function on a metabolic panel from August 2020, will recheck on this round of testing.  Creatinine was about 1.35, previous levels between 1.1 and 1.2.    History of head neck cancer with right parotid gland surgery in 2011  followed by radiation therapy for squamous cell carcinoma, with no recurrences, he will continue to follow-up with his dentist, oral health seems good.      Actinic keratoses, with lesions on his scalp that need to be examined by dermatology, sees them every 3 months.      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      History of deep venous thrombosis associated with the 2019 knee arthroplasty, no longer needs to be on anticoagulation.      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      Up-to-date on vaccinations including seasonal flu vaccine, pneumococcus, and shingles.

## 2021-06-14 NOTE — PROGRESS NOTES
"OFFICE VISIT NOTE    Subjective:   Chief Complaint:  Follow-up (HTN)    77-year-old man in for follow-up regarding hypertension.  He recently bought himself a blood pressure cuff.  Pressures at home seem to be running in the 170 -80 systolic range  diastolic.  He has no symptoms.    Current Outpatient Prescriptions   Medication Sig     aspirin 81 MG EC tablet Take 81 mg by mouth daily.     omeprazole (PRILOSEC) 20 MG capsule TAKE ONE CAPSULE BY MOUTH DAILY     simvastatin (ZOCOR) 20 MG tablet TAKE 1 TABLET BY MOUTH EVERY NIGHT AT BEDTIME     lisinopril (PRINIVIL,ZESTRIL) 10 MG tablet Take 10 mg by mouth daily.     losartan (COZAAR) 25 MG tablet Take 1 tablet (25 mg total) by mouth daily.       Review of Systems:  A comprehensive review of systems is negative except for the comments above    Objective:    Pulse 83  Ht 5' 9\" (1.753 m)  Wt 198 lb (89.8 kg)  SpO2 95%  BMI 29.24 kg/m2  GENERAL: No acute distress.  Blood pressure with his automatic cuff today is 158/86.  Blood pressure by me is 152/84.  Second pressure was 148/84.  Pulse in the 75-80 range.    Assessment & Plan   Michael Maldonado is a 77 y.o. male.    Hypertension.  I am going to start losartan at 25 mg daily.  Return to clinic in 3-4 weeks for blood pressure check we can adjust meds daily.  He should continue to monitor with his home blood pressure cuff.  It seems to be running 5-10 points higher than blood pressure in my office.    Diagnoses and all orders for this visit:    Essential hypertension  -     losartan (COZAAR) 25 MG tablet; Take 1 tablet (25 mg total) by mouth daily.  Dispense: 30 tablet; Refill: 1        Memo Martinez MD  Transcription using voice recognition software, may contain typographical errors.    "

## 2021-06-14 NOTE — PROGRESS NOTES
"OFFICE VISIT NOTE    Subjective:   Chief Complaint:  Follow-up (started losartan but no improvement. keeps gaining weight)    77-year-old man in for follow-up regarding essential hypertension.  He also has a history of hyperlipidemia.  He is doing fairly well.  His blood pressure is running a little high at home.  No cardiovascular symptoms.  No TIAs.  He wants me to check some spots on his back.    Current Outpatient Prescriptions   Medication Sig     aspirin 81 MG EC tablet Take 81 mg by mouth daily.     losartan (COZAAR) 25 MG tablet Take 1 tablet (25 mg total) by mouth daily.     omeprazole (PRILOSEC) 20 MG capsule TAKE ONE CAPSULE BY MOUTH DAILY     simvastatin (ZOCOR) 20 MG tablet TAKE 1 TABLET BY MOUTH EVERY NIGHT AT BEDTIME     losartan (COZAAR) 50 MG tablet Take 1 tablet (50 mg total) by mouth daily.       PSFHx: Tobacco Status:  He  reports that he quit smoking about 17 years ago. His smoking use included Cigars. He has never used smokeless tobacco.    Review of Systems:  A comprehensive review of systems is negative except for the comments above    Objective:    Pulse 79  Ht 5' 9\" (1.753 m)  Wt 202 lb (91.6 kg)  SpO2 99%  BMI 29.83 kg/m2  GENERAL: No acute distress.  Today's blood pressure is better at 148/80.  Pulse is 80 and regular.  Lungs are clear.  Heart shows a sinus rhythm without gallop.  No carotid bruits.  He has seborrheic keratoses on his back.  He also has one epidermal cyst that is not infected also on his back.  No edema.  Weight is up about 4 pounds.    Assessment & Plan   Michael Maldonado is a 77 y.o. male.    Essential hypertension.  Good increase losartan to 50 mg daily.  Back lesions are seborrheic keratoses and do not require treatment.  A long discussion regarding diet weight loss and restriction of sodium.  30 minutes was spent with this patient the majority of which was counseling discussing diet and therapy.  Return to clinic in 3 weeks.    Diagnoses and all orders for this " visit:    Essential hypertension  -     losartan (COZAAR) 50 MG tablet; Take 1 tablet (50 mg total) by mouth daily.  Dispense: 30 tablet; Refill: 11    Mixed hyperlipidemia        The following high BMI interventions were performed this visit: encouragement to exercise    Memo Martinez MD  Transcription using voice recognition software, may contain typographical errors.

## 2021-06-14 NOTE — TELEPHONE ENCOUNTER
Reason for Call:  Medication or medication refill:    Do you use a [facility]?  Name of the pharmacy and phone number for the current request: Losartan 50mg     Name of the medication requested: Losartan 50mg     Other request: Patient is in [place]. Out of medication and needs refill.     Can we leave a detailed message on this number? Yes    Phone number patient can be reached at: Home number on file [phone]    Best Time: any     Call taken on 1/7/2021 at 12:00 PM by Reymundo Connelly

## 2021-06-14 NOTE — TELEPHONE ENCOUNTER
Reason for Call:  Medication or medication refill:    Do you use a [facility]?  Name of the pharmacy and phone number for the current request: [facility] [address], [place], [place]  P [phone]  F [phone]    Name of the medication requested: omeprazole 20 MG    Other request: Requesting medication refill patient is currently in [place].     Can we leave a detailed message on this number? Yes    Phone number patient can be reached at:   Cell number on file:    Telephone Information:   Mobile [phone]       Best Time: anytime     Call taken on 1/26/2021 at 12:30 PM by Lynne Ceballos

## 2021-06-14 NOTE — PROGRESS NOTES
"OFFICE VISIT NOTE    Subjective:   Chief Complaint:  Follow-up (HTN, has had high BP andwent Urgency room last night and was given lisinopril which he started but had a bad reaction to. bodyaches.)    77-year-old man who recently had his ears cleaned out.  He checked his blood pressure was high recording in the 180/110 range.  He normally has normal blood pressures here.  He was given lisinopril 10 mg and 6 hours later an abdominal cramps and did not feel well.  This morning's blood pressure was in the 145/89 range.    Current Outpatient Prescriptions   Medication Sig     aspirin 81 MG EC tablet Take 81 mg by mouth daily.     lisinopril (PRINIVIL,ZESTRIL) 10 MG tablet Take 10 mg by mouth daily.     omeprazole (PRILOSEC) 20 MG capsule TAKE ONE CAPSULE BY MOUTH DAILY     simvastatin (ZOCOR) 20 MG tablet TAKE 1 TABLET BY MOUTH EVERY NIGHT AT BEDTIME       Review of Systems:  A comprehensive review of systems is negative except for the comments above    Objective:    Pulse 74  Ht 5' 9\" (1.753 m)  Wt 199 lb (90.3 kg)  SpO2 97%  BMI 29.39 kg/m2  GENERAL: No acute distress.  Today's blood pressure 134/80 left arm 1 3678 right arm.  Pulse is 80.  Heart shows a regular rhythm without murmurs gallops or rubs.  No edema.    Assessment & Plan   Michael Maldonado is a 77 y.o. male.    I am getting normal blood pressures here.  I have asked him to get 3 or 4 blood pressures out of the office.  Consistently over 140/90 then I would have her bring the blood pressure cuff and will see if he agrees with our blood pressure readings here.  He will not tolerate lisinopril since he has reactions.  We will see if we need to treat him or not.    Diagnoses and all orders for this visit:    Essential hypertension        Memo Martinez MD  Transcription using voice recognition software, may contain typographical errors.    "

## 2021-06-15 ENCOUNTER — COMMUNICATION - HEALTHEAST (OUTPATIENT)
Dept: INTERNAL MEDICINE | Facility: CLINIC | Age: 81
End: 2021-06-15

## 2021-06-15 DIAGNOSIS — I10 ESSENTIAL HYPERTENSION: ICD-10-CM

## 2021-06-15 NOTE — PROGRESS NOTES
"OFFICE VISIT NOTE    Subjective:   Chief Complaint:  Follow-up (HTN 3 weeks)    77-year-old man in for follow-up regarding hypertension.  I recently increased his losartan to 50 mg daily and seems to be tolerating this quite well.  No cardiovascular symptoms.  No side effects.    Current Outpatient Prescriptions   Medication Sig     aspirin 81 MG EC tablet Take 81 mg by mouth daily.     losartan (COZAAR) 50 MG tablet Take 1 tablet (50 mg total) by mouth daily.     omeprazole (PRILOSEC) 20 MG capsule TAKE ONE CAPSULE BY MOUTH DAILY     omeprazole (PRILOSEC) 20 MG capsule TAKE ONE CAPSULE BY MOUTH DAILY     simvastatin (ZOCOR) 20 MG tablet TAKE 1 TABLET BY MOUTH EVERY NIGHT AT BEDTIME       Review of Systems:  A comprehensive review of systems is negative except for the comments above    Objective:    Pulse 78  Ht 5' 9\" (1.753 m)  Wt 201 lb (91.2 kg)  SpO2 96%  BMI 29.68 kg/m2  GENERAL: No acute distress.  Today's blood pressure is 148/80.  Pulse 76 and regular.  Lungs are clear.  Heart shows regular sinus rhythm without ectopic beats.    Assessment & Plan   Michael Maldonado is a 77 y.o. male.    he has been doing much better on the current dosage of losartan.  I will continue with 50 mg daily and hope it drops further.  If the systolic pressure does not drop under 140, I would add a medication such as a low-dose diuretic.  Diagnoses and all orders for this visit:    Essential hypertension  -     losartan (COZAAR) 50 MG tablet; Take 1 tablet (50 mg total) by mouth daily.  Dispense: 90 tablet; Refill: 3    Mixed hyperlipidemia        Memo Martinez MD  Transcription using voice recognition software, may contain typographical errors.    "

## 2021-06-15 NOTE — PROGRESS NOTES
Assessment and Plan:   77-year-old man in for annual wellness exam    1. Routine general medical examination at a health care facility    - Urinalysis-UC if Indicated    2. Mixed hyperlipidemia  Currently takes Zocor 20 mg.  No cardiovascular symptoms.  - Lipid Cascade    3. Head and neck cancer  No recurrence after surgery and radiation.  Diagnosis was 2011.    4. Essential hypertension  Today's blood pressure 148/96.  Add the medication amlodipine 5 mg.  Check EKG for LVH, arrhythmia etc.  - Electrocardiogram Perform and Read  - amLODIPine (NORVASC) 5 MG tablet; Take 1 tablet (5 mg total) by mouth daily.  Dispense: 30 tablet; Refill: 11    5. Leukopenia  Check CBC.    6. Medication management  No obvious problems with medication.  - HM1(CBC and Differential)  - Comprehensive Metabolic Panel  - HM1 (CBC with Diff)    7. Mild cognitive impairment  Unable to draw a clock.  Seems to need extra directions as far as following any direction.    8. Prostate cancer screening  Prostate feels normal on exam.  - PSA (Prostatic-Specific Antigen), Annual Screen        The patient's current medical problems were reviewed.    I have had an Advance Directives discussion with the patient.  The following health maintenance schedule was reviewed with the patient and provided in printed form in the after visit summary:   Health Maintenance   Topic Date Due     FALL RISK ASSESSMENT  04/12/2017     TD 18+ HE  01/01/2018     ADVANCE DIRECTIVES DISCUSSED WITH PATIENT  01/01/2018     PNEUMOCOCCAL POLYSACCHARIDE VACCINE AGE 65 AND OVER  Completed     INFLUENZA VACCINE RULE BASED  Completed     PNEUMOCOCCAL CONJUGATE VACCINE FOR ADULTS (PCV13 OR PREVNAR)  Completed     ZOSTER VACCINE  Completed        Subjective:   Chief Complaint: Michael Maldonado is an 77 y.o. male here for an Annual Wellness visit.   HPI: 77-year-old man in for annual wellness exam.    Review of Systems: No chest pain with exertion.  Denies chronic cough wheezing or  asthma.  No dysphasia.  No change in bowel  Nocturia ×1 per  Minor aches and pains in the right knee.  Gets cortisone shots to 3 times per year.  No migraines.  No epilepsy.  No TIA-like symptoms.  Some decreased in memory.  Wears hearing aids.  Has chronic hearing problems.  He had significant surgery in the right ear following his cancer diagnosis.    Please see above.  The rest of the review of systems are negative for all systems.    Patient Care Team:  Memo Martinez MD as PCP - General (Internal Medicine)     Patient Active Problem List   Diagnosis     Hyperlipidemia     Head and neck cancer     Hypertension     Leukopenia     Past Medical History:   Diagnosis Date     BPH (benign prostatic hyperplasia)      Hyperlipidemia      Hypertension      Leukopenia     mild     Osteoarthritis       Past Surgical History:   Procedure Laterality Date     CRANIOTOMY FOR AVM  1970    clipped     SALIVARY GLAND SURGERY Right 2011    squamous cell cancer grade 3     TOTAL KNEE ARTHROPLASTY Left 2013      Family History   Problem Relation Age of Onset     Cirrhosis Father      Diabetes Father       Social History     Social History     Marital status:      Spouse name: N/A     Number of children: N/A     Years of education: N/A     Occupational History     Not on file.     Social History Main Topics     Smoking status: Former Smoker     Types: Cigars     Quit date: 2000     Smokeless tobacco: Never Used     Alcohol use Not on file     Drug use: Not on file     Sexual activity: Not on file     Other Topics Concern     Not on file     Social History Narrative    , retired [employer]      Current Outpatient Prescriptions   Medication Sig Dispense Refill     aspirin 81 MG EC tablet Take 81 mg by mouth daily.       losartan (COZAAR) 50 MG tablet Take 1 tablet (50 mg total) by mouth daily. 90 tablet 3     omeprazole (PRILOSEC) 20 MG capsule TAKE ONE CAPSULE BY MOUTH DAILY 90 capsule 0     simvastatin (ZOCOR) 20 MG  "tablet TAKE 1 TABLET BY MOUTH EVERY NIGHT AT BEDTIME 90 tablet 0     No current facility-administered medications for this visit.       Objective:   Vital Signs:   Visit Vitals     Pulse 82     Ht 5' 9\" (1.753 m)     Wt 200 lb (90.7 kg)     SpO2 98%     BMI 29.53 kg/m2        VisionScreening:  No exam data present     PHYSICAL EXAM  Blood pressure 148/96.  Pulse 68.  EYES: Eyelids, conjunctiva, and sclera were normal. Pupils were normal. Cornea, iris, and lens were normal bilaterally.  HEAD, EARS, NOSE, MOUTH, AND THROAT: Head and face were normal. Hearing was normal to voice and the ears were normal to external exam. Nose appearance was normal and there was no discharge. Oropharynx was normal.  NECK: Neck appearance was normal. There were no neck masses and the thyroid was not enlarged.  The latter hearing aids.  There is surgical deformity of the right ear.  RESPIRATORY: Breathing pattern was normal and the chest moved symmetrically.  Percussion/auscultatory percussion was normal.  Lung sounds were normal and there were no abnormal sounds.  CARDIOVASCULAR: Heart rate and rhythm were normal.  S1 and S2 were normal and there were no extra sounds or murmurs. Peripheral pulses in arms and legs were normal.  Jugular venous pressure was normal.  There was no peripheral edema.  GASTROINTESTINAL: The abdomen was normal in contour.  Bowel sounds were present.  Percussion detected no organ enlargement or tenderness.  Palpation detected no tenderness, mass, or enlarged organs.   MUSCULOSKELETAL: Skeletal configuration was normal and muscle mass was normal for age. Joint appearance was overall normal.  left total knee replacement in the past.  LYMPHATIC: There were no enlarged nodes.  SKIN/HAIR/NAILS: Skin color was normal.  There were no skin lesions.  Hair and nails were normal.  Seborrheic keratoses on his back.  NEUROLOGIC: The patient was alert and oriented to person, place, time, and circumstance. Speech was normal. " Cranial nerves were normal. Motor strength was normal for age. The patient was normally coordinated.  Unable to draw a clock.  PSYCHIATRIC:  Mood and affect were normal and the patient had normal recent and remote memory.  Recent memory impaired.    ADDITIONAL VITAL SIGNS: Pulse 68  CHEST WALL/BREASTS: Normal male  RECTAL: No rectal masses.  Prostate 2+ enlarged without nodule  GENITAL/URINARY: Normal male        Assessment Results 1/15/2018   Activities of Daily Living No help needed   Instrumental Activities of Daily Living No help needed   Get Up and Go Score Less than 12 seconds   Mini Cog Total Score 0   Some recent data might be hidden     A Mini-Cog score of 0-2 suggests the possibility of dementia, score of 3-5 suggests no dementia    Identified Health Risks:     The patient reports that he drinks more than one alcoholic drink per day but denies binge or excessive drinking. He was counseled and given information about possible harmful effects of excessive alcohol intake.  The patient was counseled and encouraged to consider modifying their diet and eating habits. He was provided with information on recommended healthy diet options.  Patient's advanced directive was discussed and I am comfortable with the patient's wishes.        The patient was provided with appropriate referrals to address his memory problem.

## 2021-06-16 PROBLEM — E03.4 HYPOTHYROIDISM DUE TO ACQUIRED ATROPHY OF THYROID: Chronic | Status: ACTIVE | Noted: 2018-04-11

## 2021-06-16 PROBLEM — Z85.89 HISTORY OF HEAD AND NECK CANCER: Status: ACTIVE | Noted: 2020-11-12

## 2021-06-16 PROBLEM — Z86.72 HISTORY OF DEEP VEIN THROMBOPHLEBITIS OF LOWER EXTREMITY: Chronic | Status: ACTIVE | Noted: 2018-11-09

## 2021-06-16 PROBLEM — Z96.651 STATUS POST TOTAL KNEE REPLACEMENT, RIGHT: Status: ACTIVE | Noted: 2019-01-07

## 2021-06-16 NOTE — TELEPHONE ENCOUNTER
Refill Approved    Rx renewed per Medication Renewal Policy. Medication was last renewed on 1/16/20.    Earle Matos, [facility] Triage/Med Refill 4/5/2021     Requested Prescriptions   Pending Prescriptions Disp Refills     levothyroxine (SYNTHROID, LEVOTHROID) 75 MCG tablet [Pharmacy Med Name: LEVOTHYROXINE 75 MCG TABLET] 90 tablet 3     Sig: TAKE 1 TABLET BY MOUTH EVERY DAY       Thyroid Hormones Protocol Passed - 4/4/2021 10:38 AM        Passed - Provider visit in past 12 months or next 3 months     Last office visit with prescriber/PCP: 7/1/2019 Memo Martinez MD OR same dept: Visit date not found OR same specialty: 11/12/2020 Malvin Caballero MD  Last physical: 1/22/2020 Last MTM visit: Visit date not found   Next visit within 3 mo: Visit date not found  Next physical within 3 mo: Visit date not found  Prescriber OR PCP: Memo Martinez MD  Last diagnosis associated with med order: 1. Hypothyroidism due to acquired atrophy of thyroid  - levothyroxine (SYNTHROID, LEVOTHROID) 75 MCG tablet [Pharmacy Med Name: LEVOTHYROXINE 75 MCG TABLET]; TAKE 1 TABLET BY MOUTH EVERY DAY  Dispense: 90 tablet; Refill: 3    If protocol passes may refill for 12 months if within 3 months of last provider visit (or a total of 15 months).             Passed - TSH on file in past 12 months for patient age 12 & older     TSH   Date Value Ref Range Status   11/12/2020 4.63 0.30 - 5.00 uIU/mL Final

## 2021-06-16 NOTE — TELEPHONE ENCOUNTER
Telephone Encounter by Farideh Mcclendon at 1/28/2019  1:02 PM     Author: Farideh Mcclendon Service: -- Author Type: --    Filed: 1/28/2019  1:03 PM Encounter Date: 1/27/2019 Status: Signed    : Farideh Mcclendon       [facility] team  [phone]    PA has been initiated.

## 2021-06-16 NOTE — TELEPHONE ENCOUNTER
Telephone Encounter by Farideh Mcclendon at 1/29/2019  9:22 AM     Author: Farideh Mcclendon Service: -- Author Type: --    Filed: 1/29/2019  9:23 AM Encounter Date: 1/27/2019 Status: Signed    : Farideh Mcclendon APPROVED:    Approval start date: 12/29/18  Approval end date: 1/28/2020    Pharmacy has been notified of approval and will contact patient when medication is ready for pickup.

## 2021-06-17 NOTE — PROGRESS NOTES
OFFICE VISIT NOTE    Subjective:   Chief Complaint:  Follow-up (thyroid and BP/ fasting)    This is a 77-year-old man in for follow-up regarding hypertension, hypothyroidism, and a lesion on his back that he wants me to check.  He is feeling okay.  Some minor soreness of the right ear.  He will have his hearing aids adjusted in the very near future.  No cardiac symptoms.  No complaints of tachycardia coldness overheating etc.    Current Outpatient Prescriptions   Medication Sig     amLODIPine (NORVASC) 5 MG tablet Take 1 tablet (5 mg total) by mouth daily.     aspirin 81 MG EC tablet Take 81 mg by mouth daily.     levothyroxine (SYNTHROID) 75 MCG tablet Take 1 tablet (75 mcg total) by mouth daily.     losartan (COZAAR) 50 MG tablet Take 1 tablet (50 mg total) by mouth daily.     omeprazole (PRILOSEC) 20 MG capsule TAKE ONE CAPSULE BY MOUTH DAILY     simvastatin (ZOCOR) 20 MG tablet TAKE 1 TABLET BY MOUTH EVERY NIGHT AT BEDTIME       PSFHx: Tobacco Status:  He  reports that he quit smoking about 18 years ago. His smoking use included Cigars. He has never used smokeless tobacco.    Review of Systems:  A comprehensive review of systems is negative except for the comments above    Objective:    There were no vitals taken for this visit.  GENERAL: No acute distress.  Weight is stable.  Blood pressure today is 122/78.  Pulse is 68.  He has an epidermal cyst on his upper back.  He has a 6 mm diameter area of erythema on the left flank region of his back.  Reflexes seem normal.  No obvious thyroid goiter palpated.  Heart shows a regular rhythm without ectopic beats.  Lungs seem clear.  Right ear seems normal.  He has had surgery in that region in the past.    Assessment & Plan   Michael Maldonado is a 77 y.o. male.    Check his TSH and see if it is now normal while taking Synthroid 75 mcg daily.  We can adjust his Synthroid as needed.  Blood pressure medicines etc. remain the same.  He will check with his dermatologist  regarding the epidermal cyst and the red lesion on his back.    Diagnoses and all orders for this visit:    Leukopenia, unspecified type  -     HM1(CBC and Differential)  -     HM1 (CBC with Diff)    Hypothyroidism due to acquired atrophy of thyroid  -     Thyroid Cascade    Essential hypertension    Epidermal cyst        The following high BMI interventions were performed this visit: encouragement to exercise    Memo Martinez MD  Transcription using voice recognition software, may contain typographical errors.

## 2021-06-17 NOTE — PATIENT INSTRUCTIONS - HE
Patient Instructions by Memo Martinez MD at 1/22/2020  7:20 AM     Author: Memo Martinez MD Service: -- Author Type: Physician    Filed: 1/22/2020  7:21 AM Encounter Date: 1/22/2020 Status: Signed    : Memo Martinez MD (Physician)         Patient Education   Alcohol Use   Many people can enjoy a glass of wine or beer without any negative consequences to their health. According to the Centers for Disease Control and Prevention (CDC), having one or fewer drinks per day for women and two or fewer per day for men is considered moderate drinking.     When people drink more than moderately, it can become concerning. Excessive drinking is defined as consuming 15 drinks or more per week for men and 8 drinks or more per week for women. There are various health problems associated with excessive drinking, which include:    Damage to vital organs like the heart, brain, liver and pancreas    Harm to the digestive tract    Weaken the immune system    Higher risk for heart disease and cancer       Patient Education   Understanding [employer] MyPlate  The USDA (US Department of Agriculture) has guidelines to help you make healthy food choices. These are called MyPlate. MyPlate shows the food groups that make up healthy meals using the image of a place setting. Before you eat, think about the healthiest choices for what to put onto your plate or into your cup or bowl. To learn more about building a healthy plate, visit www.choosemyplate.gov.       The Food Groups    Fruits: Any fruit or 100% fruit juice counts as part of the Fruit Group. Fruits may be fresh, canned, frozen, or dried, and may be whole, cut-up, or pureed. Make half your plate fruits and vegetables.    Vegetables: Any vegetable or 100% vegetable juice counts as a member of the Vegetable Group. Vegetables may be fresh, frozen, canned, or dried. They can be served raw or cooked and may be whole, cut-up, or mashed. Make half your plate fruits and  vegetables.     Grains: All foods made from grains are part of the Grains Group. These include wheat, rice, oats, cornmeal, and barley such as bread, pasta, oatmeal, cereal, tortillas, and grits. Grains should be no more than a quarter of your plate. At least half of your grains should be whole grains.    Protein: This group includes meat, poultry, seafood, beans and peas, eggs, processed soy products (like tofu), nuts (including nut butters), and seeds. Make protein choices no more than a quarter of your plate. Meat and poultry choices should be lean or low fat.    Dairy: All fluid milk products and foods made from milk that contain calcium, like yogurt and cheese are part of the Dairy Group. (Foods that have little calcium, such as cream, butter, and cream cheese, are not part of the group.) Most dairy choices should be low-fat or fat-free.    Oils: These are fats that are liquid at room temperature. They include canola, corn, olive, soybean, and sunflower oil. Foods that are mainly oil include mayonnaise, certain salad dressings, and soft margarines. You should have only 5 to 7 teaspoons of oils a day. You probably already get this much from the food you eat.  Use [employer] to Help Build Your Meals  The SuperTracker can help you plan and track your meals and activity. You can look up individual foods to see or compare their nutritional value. You can get guidelines for what and how much you should eat. You can compare your food choices. And you can assess personal physical activities and see ways you can improve. Go to www.chooseJocoosplate.gov/supertracker/.    2865-3389 [facility]. [address] [place] [address], [place] [address]. All rights reserved. This information is not intended as a substitute for professional medical care. Always follow your healthcare professional's instructions.             Advance Directive  Patients advance directive was discussed and I am comfortable with the patients  wishes.  Patient Education   Personalized Prevention Plan  You are due for the preventive services outlined below.  Your care team is available to assist you in scheduling these services.  If you have already completed any of these items, please share that information with your care team to update in your medical record.  Health Maintenance   Topic Date Due   ? MEDICARE ANNUAL WELLNESS VISIT  01/15/2019   ? FALL RISK ASSESSMENT  12/05/2019   ? ZOSTER VACCINES (3 of 3) 03/21/2019   ? TD 18+ HE  08/10/2022   ? LIPID  07/01/2024   ? ADVANCE CARE PLANNING  10/14/2024   ? PNEUMOCOCCAL IMMUNIZATION 65+ LOW/MEDIUM RISK  Completed   ? INFLUENZA VACCINE RULE BASED  Completed

## 2021-06-17 NOTE — PATIENT INSTRUCTIONS - HE
Patient Instructions by Emilee Blackmon PA-C at 12/10/2019 11:10 AM     Author: Emilee Blackmon PA-C Service: -- Author Type: Physician Assistant    Filed: 12/10/2019 11:41 AM Encounter Date: 12/10/2019 Status: Signed    : Emilee Blackmon PA-C (Physician Assistant)       Patient Education     Viral Gastroenteritis (Adult)    Gastroenteritis is commonly called the stomach flu. It is most often caused by a virus that affects the stomach and intestinal tract and usually lasts from 2 to 7 days. Common viruses causing gastroenteritis include norovirus, rotavirus, and hepatitis A. Non-viral causes of gastroenteritis include bacteria, parasites, and toxins.  The danger from repeated vomiting or diarrhea is dehydration. This is the loss of too much fluid from the body. When this occurs, body fluids must be replaced. Antibiotics do not help with this illness because it is usually viral.Simple home treatment will be helpful.  Symptoms of viral gastroenteritis may include:    Watery, loose stools    Stomach pain or abdominal cramps    Fever and chills    Nausea and vomiting    Loss of bowel control    Headache  Home care  Gastroenteritis is transmitted by contact with the stool or vomit of an infected person. This can occur from person to person or from contact with a contaminated surface.  Follow these guidelines when caring for yourself at home:    If symptoms are severe, rest at home for the next 24 hours or until you are feeling better.    Wash your hands with soap and water or use alcohol-based  to prevent the spread of infection. Wash your hands after touching anyone who is sick.    Wash your hands or use alcohol-based  after using the toilet and before meals. Clean the toilet after each use.  Remember these tips when preparing food:    People with diarrhea should not prepare or serve food to others. When preparing foods, wash your hands before and after.    Wash your hands after using  cutting boards, countertops, knives, or utensils that have been in contact with raw food.    Keep uncooked meats away from cooked and ready-to-eat foods.  Medicine  You may use acetaminophen or NSAID medicines like ibuprofen or naproxen to control fever unless another medicine was given. If you have chronic liver or kidney disease, talk with your healthcare provider before using these medicines. Also talk with your provider if you've had a stomach ulcer or gastrointestinal bleeding. Don't give aspirin to anyone under 18 years of age who is ill with a fever. It may cause severe liver damage. Don't use NSAIDS is you are already taking one for another condition (like arthritis) or are on aspirin (such as for heart disease or after a stroke).  If medicine for vomiting or diarrhea are prescribed, take these only as directed. Do not take over-the-counter medicines for vomiting or diarrhea unless instructed by your healthcare provider.  Diet  Follow these guidelines for food:    Water and liquids are important so you don't get dehydrated. Drink a small amount at a time or suck on ice chips if you are vomiting.    If you eat, avoid fatty, greasy, spicy, or fried foods.    Don't eat dairy if you have diarrhea. This can make diarrhea worse.    Avoid tobacco, alcohol, and caffeine which may worsen symptoms.  During the first 24 hours (the first full day), follow the diet below:    Beverages. Sports drinks, soft drinks without caffeine, ginger ale, mineral water (plain or flavored), decaffeinated tea and coffee. If you are very dehydrated, sports drinks aren't a good choice. They have too much sugar and not enough electrolytes. In this case, commercially available products called oral rehydration solutions, are best.    Soups. Eat clear broth, consommé, and bouillon.    Desserts. Eat gelatin, popsicles, and fruit juice bars.  During the next 24 hours (the second day), you may add the following to the above:    Hot cereal, plain  toast, bread, rolls, and crackers    Plain noodles, rice, mashed potatoes, chicken noodle or rice soup    Unsweetened canned fruit (avoid pineapple), bananas    Limit fat intake to less than 15 grams per day. Do this by avoiding margarine, butter, oils, mayonnaise, sauces, gravies, fried foods, peanut butter, meat, poultry, and fish.    Limit fiber and avoid raw or cooked vegetables, fresh fruits (except bananas), and bran cereals.    Limit caffeine and chocolate. Don't use spices or seasonings other than salt.    Limit dairy products.    Avoid alcohol.  During the next 24 hours:    Gradually resume a normal diet as you feel better and your symptoms improve.    If at any time it starts getting worse again, go back to clear liquids until you feel better.  Follow-up care  Follow up with your healthcare provider, or as advised. Call your provider if you don't get better within 24 hours or if diarrhea lasts more than a week. Also follow up if you are unable to keep down liquids and get dehydrated. If a stool (diarrhea) sample was taken, call as directed for the results.  Call 911  Call 911 if any of these occur:    Trouble breathing    Chest pain    Confused    Severe drowsiness or trouble awakening    Fainting or loss of consciousness    Rapid heart rate    Seizure    Stiff neck  When to seek medical advice  Call your healthcare provider right away if any of these occur:    Abdominal pain that gets worse    Continued vomiting (unable to keep liquids down)    Frequent diarrhea (more than 5 times a day)    Blood in vomit or stool (black or red color)    Dark urine, reduced urine output, or extreme thirst    Weakness or dizziness    Drowsiness    Fever of 100.4 F (38 C) or higher, or as directed by your healthcare provider    New rash  Date Last Reviewed: 1/3/2016    0121-1470 [employer]. [address] [place] [address], [place] [address]. All rights reserved. This information is not intended as a substitute for  professional medical care. Always follow your healthcare professional's instructions.

## 2021-06-19 NOTE — LETTER
Letter by Memo Martinez MD at      Author: Memo Martinez MD Service: -- Author Type: --    Filed:  Encounter Date: 4/15/2019 Status: (Other)         Michael Maldonado  [address] [facility] [address]             April 15, 2019         Dear Mr. Maldonado,    Below are the results from your recent visit:    Resulted Orders   CT Chest Without Contrast    Narrative    EXAM: CT CHEST WO CONTRAST  LOCATION: [facility]  DATE/TIME: 4/15/2019 10:49 AM    INDICATION: pulmonary nodules  COMPARISON: 1/22/2019, 11/16/2010  TECHNIQUE: Helical images were obtained through the chest. Multiplanar reformats were obtained. Dose reduction techniques were used.  IV CONTRAST: None.    FINDINGS:   LUNGS AND PLEURA: Slight respiratory motion artifact. Punctate ovoid nodule in the right middle lobe (image 92) and the slightly irregular linear density further inferiorly (image 100) are stable compared to the study done in January. The more inferior   linear opacity is less prominent than on the 2010 exam. There are no new pulmonary nodules. No effusions.    MEDIASTINUM: Extensive coronary artery calcifications. No adenopathy.    LIMITED UPPER ABDOMEN: Unremarkable.    MUSCULOSKELETAL: Unremarkable.      Impression    CONCLUSION:   1.  The  two tiny 2 right middle lobe opacities can be seen on studies going back to 2010. The linear opacity is actually smaller than on the study done 9 years ago. These are benign and need no additional follow-up.  2.  Extensive coronary artery calcifications.         Michael, recent CT scan showed there was no evidence of any significant abnormalities in the lung.  The 2 tiny nodules have been present since at least 2010 and have not changed.  No risk of malignancy.    Please call with questions or contact us using [employer].    Sincerely,        Electronically signed by Memo Martinez MD

## 2021-06-19 NOTE — LETTER
Letter by Memo Martinez MD at      Author: Memo Martinez MD Service: -- Author Type: --    Filed:  Encounter Date: 7/1/2019 Status: (Other)         Michael Maldonado  [address] [facility] [address]             July 1, 2019         Dear Mr. Maldonado,    Below are the results from your recent visit:    Resulted Orders   Lipid Cascade   Result Value Ref Range    Cholesterol 152 <=199 mg/dL    Triglycerides 140 <=149 mg/dL    HDL Cholesterol 52 >=40 mg/dL    LDL Calculated 72 <=129 mg/dL    Patient Fasting > 8hrs? Yes    Comprehensive Metabolic Panel   Result Value Ref Range    Sodium 143 136 - 145 mmol/L    Potassium 4.2 3.5 - 5.0 mmol/L    Chloride 108 (H) 98 - 107 mmol/L    CO2 25 22 - 31 mmol/L    Anion Gap, Calculation 10 5 - 18 mmol/L    Glucose 85 70 - 125 mg/dL    BUN 17 8 - 28 mg/dL    Creatinine 1.15 0.70 - 1.30 mg/dL    GFR MDRD Af Amer >60 >60 mL/min/1.73m2    GFR MDRD Non Af Amer >60 >60 mL/min/1.73m2    Bilirubin, Total 0.8 0.0 - 1.0 mg/dL    Calcium 9.8 8.5 - 10.5 mg/dL    Protein, Total 6.5 6.0 - 8.0 g/dL    Albumin 4.1 3.5 - 5.0 g/dL    Alkaline Phosphatase 76 45 - 120 U/L    AST 19 0 - 40 U/L    ALT 21 0 - 45 U/L    Narrative    Fasting Glucose reference range is 70-99 mg/dL per  American Diabetes Association (ADA) guidelines.   CK Total   Result Value Ref Range    CK, Total 244 (H) 30 - 190 U/L       Michael today's labs are reviewed.  I am pleased to see your lipid levels have dropped down nicely.  LDL cholesterol, the bad variety, is now at goal at 72.  Your electrolytes, kidney functions, and liver function tests, were all normal.  There was a minimal elevation in CK which I do not think is significant.    Please call with questions or contact us using [employer].    Sincerely,        Electronically signed by Memo Martinez MD

## 2021-06-19 NOTE — PROGRESS NOTES
OFFICE VISIT NOTE    Subjective:   Chief Complaint:  No chief complaint on file.    78-year-old man in for follow-up regarding hypothyroidism and hypertension.  He is feeling okay.  He is going to need a right total knee replacement sometime in October.  He does take his thyroid daily.  No complaints of feeling cold all the time, constipation etc.    Current Outpatient Prescriptions   Medication Sig     amLODIPine (NORVASC) 5 MG tablet Take 1 tablet (5 mg total) by mouth daily.     aspirin 81 MG EC tablet Take 81 mg by mouth daily.     levothyroxine (SYNTHROID, LEVOTHROID) 75 MCG tablet Take 1 tablet (75 mcg total) by mouth daily.     losartan (COZAAR) 50 MG tablet Take 1 tablet (50 mg total) by mouth daily.     omeprazole (PRILOSEC) 20 MG capsule TAKE ONE CAPSULE BY MOUTH DAILY     simvastatin (ZOCOR) 20 MG tablet TAKE 1 TABLET BY MOUTH EVERY NIGHT AT BEDTIME       Review of Systems:  A comprehensive review of systems is negative except for the comments above    Objective:    There were no vitals taken for this visit.  GENERAL: No acute distress.  Weight is down 3 pounds.  Today's blood pressures 130/72.  Pulse is 76.  No palpable goiter.  Reflexes seem normal.    Assessment & Plan   Michael Maldonado is a 78 y.o. male.    Hypothyroidism currently stable on replacement therapy.  TSH is checked today.  Pressures quite good at 130/72 medicines will remain the same.    Diagnoses and all orders for this visit:    Essential hypertension    Mixed hyperlipidemia    Hypothyroidism due to acquired atrophy of thyroid  -     Thyroid Stimulating Hormone (TSH)        Memo Martinez MD  Transcription using voice recognition software, may contain typographical errors.

## 2021-06-20 NOTE — LETTER
Letter by Memo Martinez MD at      Author: Memo Martinez MD Service: -- Author Type: --    Filed:  Encounter Date: 9/8/2020 Status: (Other)         Michael Maldonado  [address] [facility] [address]  [address] [facility] [place] [address]             September 8, 2020         Dear Mr. Maldonado,    Below are the results from your recent visit:    Results for orders placed or performed in visit on 08/31/20   Platelet Count   Result Value Ref Range    Platelets 148 140 - 440 thou/uL   Creatinine   Result Value Ref Range    Creatinine 1.34 (H) 0.70 - 1.30 mg/dL    GFR MDRD Af Amer >60 >60 mL/min/1.73m2    GFR MDRD Non Af Amer 51 (L) >60 mL/min/1.73m2     Hi Mr Maldonado, your creatinine is slightly more elevated today.  This could be secondary to some dehydration or poor fluid intake.  Please continue to abstain from all NSAIDs including ibuprofen, Aleve, Advil, Naprosyn, Motrin or Excedrin.  I think this probably should be rechecked in the next 3 to 6 months when you see Dr. Martinez.  Thank you       Please call with questions or contact us using [employer].    Sincerely,    Dr. Don

## 2021-06-20 NOTE — LETTER
Letter by Memo Martinez MD at      Author: Memo Martinez MD Service: -- Author Type: --    Filed:  Encounter Date: 1/27/2020 Status: (Other)         Michael Maldonado  [address] [facility] [address] [facility] [address]             January 27, 2020         Dear Mr. Maldonado,    Below are the results from your recent visit:    Resulted Orders   Comprehensive Metabolic Panel   Result Value Ref Range    Sodium 142 136 - 145 mmol/L    Potassium 4.2 3.5 - 5.0 mmol/L    Chloride 106 98 - 107 mmol/L    CO2 24 22 - 31 mmol/L    Anion Gap, Calculation 12 5 - 18 mmol/L    Glucose 91 70 - 125 mg/dL    BUN 14 8 - 28 mg/dL    Creatinine 1.14 0.70 - 1.30 mg/dL    GFR MDRD Af Amer >60 >60 mL/min/1.73m2    GFR MDRD Non Af Amer >60 >60 mL/min/1.73m2    Bilirubin, Total 0.8 0.0 - 1.0 mg/dL    Calcium 9.3 8.5 - 10.5 mg/dL    Protein, Total 6.8 6.0 - 8.0 g/dL    Albumin 4.2 3.5 - 5.0 g/dL    Alkaline Phosphatase 70 45 - 120 U/L    AST 23 0 - 40 U/L    ALT 24 0 - 45 U/L    Narrative    Fasting Glucose reference range is 70-99 mg/dL per  American Diabetes Association (ADA) guidelines.   Lipid Cascade   Result Value Ref Range    Cholesterol 176 <=199 mg/dL    Triglycerides 153 (H) <=149 mg/dL    HDL Cholesterol 62 >=40 mg/dL    LDL Calculated 83 <=129 mg/dL    Patient Fasting > 8hrs? Yes    Urinalysis-UC if Indicated   Result Value Ref Range    Color, UA Yellow Colorless, Yellow, Straw, Light Yellow    Clarity, UA Clear Clear    Glucose, UA Negative Negative    Bilirubin, UA Negative Negative    Ketones, UA Negative Negative    Specific Gravity, UA 1.020 1.005 - 1.030    Blood, UA Negative Negative    pH, UA 7.0 5.0 - 8.0    Protein, UA Negative Negative mg/dL    Urobilinogen, UA 1.0 E.U./dL 0.2 E.U./dL, 1.0 E.U./dL    Nitrite, UA Negative Negative    Leukocytes, UA Negative Negative    Narrative    Microscopic not indicated  UC not indicated   Thyroid Stimulating Hormone (TSH)   Result Value Ref Range    TSH 5.00 0.30 - 5.00 uIU/mL   [facility]  (CBC with Diff)   Result Value Ref Range    WBC 3.4 (L) 4.0 - 11.0 thou/uL    RBC 4.96 4.40 - 6.20 mill/uL    Hemoglobin 15.3 14.0 - 18.0 g/dL    Hematocrit 47.1 40.0 - 54.0 %    MCV 95 80 - 100 fL    MCH 30.8 27.0 - 34.0 pg    MCHC 32.5 32.0 - 36.0 g/dL    RDW 14.6 (H) 11.0 - 14.5 %    Platelets 128 (L) 140 - 440 thou/uL    MPV 11.9 8.5 - 12.5 fL    Neutrophils % 66 50 - 70 %    Lymphocytes % 20 20 - 40 %    Monocytes % 10 2 - 10 %    Eosinophils % 4 0 - 6 %    Basophils % 1 0 - 2 %    Neutrophils Absolute 2.2 2.0 - 7.7 thou/uL    Lymphocytes Absolute 0.7 (L) 0.8 - 4.4 thou/uL    Monocytes Absolute 0.3 0.0 - 0.9 thou/uL    Eosinophils Absolute 0.1 0.0 - 0.4 thou/uL    Basophils Absolute 0.0 0.0 - 0.2 thou/uL       Michael, recent labs are reviewed.  Electrolytes, blood sugar, kidney function, and all liver function tests, were normal.  I thought your lipids looked good.  The urine analysis was normal without any infection or blood loss.  TSH, a measure of thyroid function, remains in a normal range.  Hemoglobin was normal at 15.3.  Your white blood count and platelet counts were a little on the low side.  They are certainly not in a dangerous range but should be rechecked again in about 4 weeks to get an idea which way they are moving.  Otherwise, everything else looked okay.      Please call with questions or contact us using [employer].    Sincerely,        Electronically signed by Memo Martinez MD

## 2021-06-21 NOTE — PROGRESS NOTES
"OFFICE VISIT NOTE    Subjective:   Chief Complaint:  blood clot (Had US on Tuesday at [facility], just started on Xarelto)    This is a 78-year-old man with significant osteoarthritis of the knee.  He was going to be scheduled for a needle placement of the right side.  Unfortunately a small actually fell off a wall injuring his right calf.  Ultrasound was done because of swelling and he does have a 1.5 cm thrombosis in the mid posterior tibial vein.  He was started on Xarelto 15 mg twice daily.  The drug was started 4 days ago.    Current Outpatient Prescriptions   Medication Sig     amLODIPine (NORVASC) 5 MG tablet Take 1 tablet (5 mg total) by mouth daily.     aspirin 81 MG EC tablet Take 81 mg by mouth daily.     levothyroxine (SYNTHROID, LEVOTHROID) 75 MCG tablet Take 1 tablet (75 mcg total) by mouth daily.     losartan (COZAAR) 50 MG tablet Take 1 tablet (50 mg total) by mouth daily.     omeprazole (PRILOSEC) 20 MG capsule TAKE ONE CAPSULE BY MOUTH DAILY     rivaroxaban 15 mg Tab Take by mouth 2 (two) times a day with meals.     simvastatin (ZOCOR) 20 MG tablet TAKE 1 TABLET BY MOUTH EVERY NIGHT AT BEDTIME     rivaroxaban (XARELTO) 20 mg Tab Take 1 tablet (20 mg total) by mouth daily with supper.       PSFHx: Tobacco Status:  He  reports that he quit smoking about 18 years ago. His smoking use included Cigars. He has never used smokeless tobacco.    Review of Systems:  A comprehensive review of systems is negative except for the comments above    Objective:    Pulse 77  Ht 5' 9\" (1.753 m)  Wt 207 lb (93.9 kg)  SpO2 98%  BMI 30.57 kg/m2  GENERAL: No acute distress.  He does have swelling and some minor discomfort in the mid calf.  There is ecchymosis around the ankle.  Circulation and sensation are normal.  Blood pressure is 146/86.  Pulse 76 and regular.  He is afebrile    Assessment & Plan   Michael Maldonado is a 78 y.o. male.    DVT involving the right calf.  This is posttraumatic.  And a half to hold off on any " knee replacement while he is being treated for the clot.  I anticipate we will repeat the ultrasound in about 4 weeks.  If that is normal then we could probably perhaps to the knee replacement about 12 weeks from now.  Plan is to have him on Xarelto 15 mg twice daily for 3 weeks then switch to 20 mg daily.  Tylenol for swelling and pain.  Pressures 146/86.  His medication amlodipine and losartan will remain the same.  Total of 30 minutes was spent with the patient and wife answering questions.    Diagnoses and all orders for this visit:    DVT (deep venous thrombosis) (H)    Other orders  -     rivaroxaban (XARELTO) 20 mg Tab; Take 1 tablet (20 mg total) by mouth daily with supper.  Dispense: 30 tablet; Refill: 0        The following high BMI interventions were performed this visit: encouragement to exercise    Memo Martinez MD  Transcription using voice recognition software, may contain typographical errors.

## 2021-06-21 NOTE — PROGRESS NOTES
OFFICE VISIT NOTE    Subjective:   Chief Complaint:  No chief complaint on file.    78-year-old man in for follow-up regarding minor deep vein thrombophlebitis that developed about 3 or 4 weeks ago.  He also has a history of hypertension.  He was going to have a total right knee replacement done but surgery has been postponed until January 7 because of the blood clot in the leg.  He continues to take Xarelto 1 tablet daily.  No bleeding complications.  The right leg is beginning to feel better.  He does have a hemorrhagic Baker's cyst behind the right knee because of a recent fall and injury.    Current Outpatient Prescriptions   Medication Sig     amLODIPine (NORVASC) 5 MG tablet Take 1 tablet (5 mg total) by mouth daily.     aspirin 81 MG EC tablet Take 81 mg by mouth daily.     levothyroxine (SYNTHROID, LEVOTHROID) 75 MCG tablet Take 1 tablet (75 mcg total) by mouth daily.     losartan (COZAAR) 50 MG tablet Take 1 tablet (50 mg total) by mouth daily.     omeprazole (PRILOSEC) 20 MG capsule TAKE ONE CAPSULE BY MOUTH DAILY     rivaroxaban (XARELTO) 20 mg Tab Take 1 tablet (20 mg total) by mouth daily with supper.     simvastatin (ZOCOR) 20 MG tablet TAKE 1 TABLET BY MOUTH EVERY NIGHT AT BEDTIME       Review of Systems:  A comprehensive review of systems is negative except for the comments above    Objective:    There were no vitals taken for this visit.  GENERAL: No acute distress.  Pressure 128/78.  Pulse is 70 and regular.  Heart shows a regular rhythm.  Lungs are clear.  No rales and no pleural rubs.  Right leg shows less edema.  There is less discoloration.  He still has some tenderness in the upper calf and popliteal region related to the popliteal cyst.    Assessment & Plan   Michael Maldonado is a 78 y.o. male.    Medically is quite stable.  He will continue to take Xarelto daily.  I will see him again the first week of December.  At that point plan is to probably discontinue the Xarelto mid December and  proceed with surgery in January.    Diagnoses and all orders for this visit:    Essential hypertension  -     amLODIPine (NORVASC) 5 MG tablet; Take 1 tablet (5 mg total) by mouth daily.  Dispense: 90 tablet; Refill: 3  -     losartan (COZAAR) 50 MG tablet; Take 1 tablet (50 mg total) by mouth daily.  Dispense: 90 tablet; Refill: 3    History of deep vein thrombophlebitis of lower extremity    Hypothyroidism due to acquired atrophy of thyroid  -     levothyroxine (SYNTHROID, LEVOTHROID) 75 MCG tablet; Take 1 tablet (75 mcg total) by mouth daily.  Dispense: 90 tablet; Refill: 3    Mixed hyperlipidemia  -     simvastatin (ZOCOR) 20 MG tablet; Take 1 tablet (20 mg total) by mouth at bedtime.  Dispense: 90 tablet; Refill: 3    Other orders  -     omeprazole (PRILOSEC) 20 MG capsule; Take 1 capsule (20 mg total) by mouth daily.  Dispense: 90 capsule; Refill: 3  -     rivaroxaban (XARELTO) 20 mg Tab; Take 1 tablet (20 mg total) by mouth daily with supper.  Dispense: 30 tablet; Refill: 1        Memo Martinez MD  Transcription using voice recognition software, may contain typographical errors.

## 2021-06-22 NOTE — ANESTHESIA PROCEDURE NOTES
Peripheral Block    Patient location during procedure: OR  Start time: 1/7/2019 8:24 AM  End time: 1/7/2019 8:26 AM  post-op analgesia per surgeon order as noted in medical record  Staffing:  Performing  Anesthesiologist: Omer Jeronimo MD  Preanesthetic Checklist  Completed: patient identified, site marked, risks, benefits, and alternatives discussed, timeout performed, consent obtained, airway assessed, oxygen available, suction available, emergency drugs available and hand hygiene performed  Peripheral Block  Block type: saphenous, adductor canal block  Prep: ChloraPrep  Patient position: supine  Patient monitoring: cardiac monitor, continuous pulse oximetry, heart rate and blood pressure  Laterality: right  Injection technique: ultrasound guided    Ultrasound used to visualize needle placement in proximity to nerve being blocked: yes   Permanent ultrasound image captured for medical record  Sterile gel and probe cover used for ultrasound.    Needle  Needle type: echogenic   Needle gauge: 20G  Needle length: 6 in  no peripheral nerve catheter placed  Assessment  Injection assessment: no difficulty with injection, negative aspiration for heme, no paresthesia on injection and incremental injection

## 2021-06-22 NOTE — ANESTHESIA CARE TRANSFER NOTE
Last vitals:   Vitals:    01/07/19 1106   BP: 99/58   Pulse: 61   Resp: 14   Temp: 36.1  C (97  F)   SpO2: 96%     Patient's level of consciousness is awake and drowsy  Spontaneous respirations: yes  Maintains airway independently: yes  Dentition unchanged: yes  Oropharynx: oropharynx clear of all foreign objects    QCDR Measures:  ASA# 20 - Surgical Safety Checklist: WHO surgical safety checklist completed prior to induction    PQRS# 430 - Adult PONV Prevention: 4558F - Pt received => 2 anti-emetic agents (different classes) preop & intraop  ASA# 8 - Peds PONV Prevention: NA - Not pediatric patient, not GA or 2 or more risk factors NOT present  PQRS# 424 - [facility]-op Temp Management: 4559F - At least one body temp DOCUMENTED => 35.5C or 95.9F within required timeframe  PQRS# 426 - PACU Transfer Protocol: - Transfer of care checklist used  ASA# 14 - Acute Post-op Pain: ASA14B - Patient did NOT experience pain >= 7 out of 10

## 2021-06-22 NOTE — PROGRESS NOTES
"OFFICE VISIT NOTE    Subjective:   Chief Complaint:  Follow-up (1 month, f/u blood thinner and knee surgery)    78-year-old man in for follow-up regarding hypertension and history of deep vein thrombophlebitis involving the right calf.  This occurred after a fall and injury.  He was a very minor phlebitis.  He has been taking Xarelto now.  He is due to stop that on approximately 12/15/2018.  He will have total knee replacement on the right side on January  Seventh.    Current Outpatient Medications   Medication Sig     amLODIPine (NORVASC) 5 MG tablet Take 1 tablet (5 mg total) by mouth daily.     aspirin 81 MG EC tablet Take 81 mg by mouth daily.     levothyroxine (SYNTHROID, LEVOTHROID) 75 MCG tablet Take 1 tablet (75 mcg total) by mouth daily.     losartan (COZAAR) 50 MG tablet Take 1 tablet (50 mg total) by mouth daily.     omeprazole (PRILOSEC) 20 MG capsule Take 1 capsule (20 mg total) by mouth daily.     rivaroxaban (XARELTO) 20 mg Tab Take 1 tablet (20 mg total) by mouth daily with supper.     simvastatin (ZOCOR) 20 MG tablet Take 1 tablet (20 mg total) by mouth at bedtime.     rivaroxaban (XARELTO) 20 mg Tab Take 1 tablet (20 mg total) by mouth daily with supper.       Review of Systems:  A comprehensive review of systems is negative except for the comments above    Objective:    Pulse 71   Ht 5' 9\" (1.753 m)   Wt 206 lb (93.4 kg)   SpO2 96%   BMI 30.42 kg/m    GENERAL: No acute distress.  No distress.  Blood pressure today 128/72.  Pulse 72.  Oxygen saturations 97%.  Osteoarthritic changes of the knees.  Right calf is .  He did have a bleed into the popliteal space after his fall.  There is certainly no signs of any active phlebitis at this time.  Distal pedal pulses are normal.    Assessment & Plan   Michael Maldonado is a 78 y.o. male.    Medically stable.  We will treat him with blood thinners for 12 more days and then stop.  He is scheduled for right total knee replacement January 7, " 2019.  He can use a heating pad on the right calf for any discomfort.    Diagnoses and all orders for this visit:    History of deep vein thrombophlebitis of lower extremity    Essential hypertension    Other orders  -     rivaroxaban (XARELTO) 20 mg Tab; Take 1 tablet (20 mg total) by mouth daily with supper.  Dispense: 7 tablet; Refill: 0        Memo Martinez MD  Transcription using voice recognition software, may contain typographical errors.

## 2021-06-22 NOTE — ANESTHESIA POSTPROCEDURE EVALUATION
Patient: Michael Maldonado  RIGHT TOTAL KNEE  ARTHROPLASTY  Anesthesia type: spinal    Patient location: PACU  Last vitals:   Vitals:    01/07/19 1130   BP:    Pulse:    Resp: 18   Temp:    SpO2:      Post vital signs: stable  Level of consciousness: awake and responds to simple questions  Post-anesthesia pain: pain controlled  Post-anesthesia nausea and vomiting: no  Pulmonary: unassisted, return to baseline  Cardiovascular: stable and blood pressure at baseline  Hydration: adequate  Anesthetic events: no    QCDR Measures:  ASA# 11 - [facility] Cardiac Arrest: ASA11B - Patient did NOT experience unanticipated cardiac arrest  ASA# 12 - [facility] Mortality Rate: ASA12B - Patient did NOT die  ASA# 13 - PACU Re-Intubation Rate: NA - No ETT / LMA used for case  ASA# 10 - Composite Anes Safety: ASA10A - No serious adverse event    Additional Notes:

## 2021-06-22 NOTE — PROGRESS NOTES
Preoperative Consultation   Michael Maldonado   78 y.o.  male in for preop exam.  He has osteoarthritis with symptoms in the right knee.  He will require right total knee replacement.  He is finishing his Xarelto therapy for a phlebitis.  He will be off this drug 1 week before surgery.  He is stable with no evidence of ongoing deep vein thrombophlebitis.      Date of visit: 12/31/2018  Physician: Memo Martinez MD    This is a preoperative consultation requested by Dr. Ram in preparation for right total knee replacement on 1/7/2019 at [facility], fax [phone].       Assessment and Plan   Michael Maldonado was seen in preoperative consultation in preparation for right total knee replacement.  This is a low risk surgery and the patient has no increased risk for major cardiac complications.  Please note patient has been treated the past several months for a deep vein thrombophlebitis.  He has finished more than 4 months of blood thinners.  Most recent ultrasound done about 2 weeks ago showed no evidence of persistent phlebitis.  He does have a Baker's cyst.  I have told him to take amlodipine the morning of surgery.  Do not take losartan the morning of surgery.  I would recommend 3 or 4 weeks of anticoagulant therapy following his surgery.  I have reviewed his EKG and looks okay except for an occasional PAC.  1. Preop general physical exam    2. History of deep vein thrombophlebitis of lower extremity    3. Essential hypertension    4. Primary osteoarthritis of right knee         Patient Profile   Social History     Social History Narrative    , retired jang        Past Medical History   Patient Active Problem List   Diagnosis     Hyperlipidemia     Head and neck cancer (H)     Hypertension     Leukopenia     Hypothyroidism due to acquired atrophy of thyroid     History of deep vein thrombophlebitis of lower extremity       Past Surgical History  He has a past surgical history that includes  "Craniotomy for AVM (1970); Total knee arthroplasty (Left, ); and Salivary gland surgery (Right, ).     History of Present Illness   This 78 y.o. old male is in for preop exam.    Recent antiplatelet use: no  Personal or family history of bleeding or clotting disorders: no patient did develop a phlebitis after an injury.  Steroid use in the past year: no  Personal or family history of difficulty with anesthesia: no  Current cardiopulmonary symptoms: no  Last Menstrual Period: na    Review of Systems: A comprehensive review of systems was negative except as noted.     Medications and Allergies   Current Outpatient Medications   Medication Sig Dispense Refill     amLODIPine (NORVASC) 5 MG tablet Take 1 tablet (5 mg total) by mouth daily. 90 tablet 3     levothyroxine (SYNTHROID, LEVOTHROID) 75 MCG tablet Take 1 tablet (75 mcg total) by mouth daily. 90 tablet 3     losartan (COZAAR) 50 MG tablet Take 1 tablet (50 mg total) by mouth daily. 90 tablet 3     omeprazole (PRILOSEC) 20 MG capsule Take 1 capsule (20 mg total) by mouth daily. 90 capsule 3     simvastatin (ZOCOR) 20 MG tablet Take 1 tablet (20 mg total) by mouth at bedtime. 90 tablet 3     rivaroxaban (XARELTO) 20 mg Tab Take 1 tablet (20 mg total) by mouth daily with supper. 7 tablet 0     No current facility-administered medications for this visit.      No Known Allergies     Family and Social History   Family History   Problem Relation Age of Onset     Cirrhosis Father      Diabetes Father         Social History     Tobacco Use     Smoking status: Former Smoker     Types: Cigars     Last attempt to quit: 2000     Years since quittin.0     Smokeless tobacco: Never Used   Substance Use Topics     Alcohol use: Not on file     Drug use: Not on file        Physical Exam   General Appearance:   The appearing man in no distress.    Pulse 72   Ht 5' 10\" (1.778 m)   Wt 206 lb (93.4 kg)   SpO2 95%   BMI 29.56 kg/m      EYES: Eyelids, conjunctiva, and " sclera were normal. Pupils were normal. Cornea, iris, and lens were normal bilaterally.  HEAD, EARS, NOSE, MOUTH, AND THROAT: Head and face were normal. Hearing was normal to voice and the ears were normal to external exam. Nose appearance was normal and there was no discharge. Oropharynx was normal.  Deformity on the right side of the face near the ear from his cancer surgery.  NECK: Neck appearance was normal. There were no neck masses and the thyroid was not enlarged.  RESPIRATORY: Breathing pattern was normal and the chest moved symmetrically.  Percussion/auscultatory percussion was normal.  Lung sounds were normal and there were no abnormal sounds.  CARDIOVASCULAR: Heart rate and rhythm were normal.  S1 and S2 were normal and there were no extra sounds or murmurs. Peripheral pulses in arms and legs were normal.  Jugular venous pressure was normal.  There was no peripheral edema.  GASTROINTESTINAL: The abdomen was normal in contour.  Bowel sounds were present.  Percussion detected no organ enlargement or tenderness.  Palpation detected no tenderness, mass, or enlarged organs.   MUSCULOSKELETAL: Skeletal configuration was normal and muscle mass was normal for age. Joint appearance was overall normal.  OA changes of the right knee.  Left knee has been replaced in the past.  LYMPHATIC: There were no enlarged nodes.  SKIN/HAIR/NAILS: Skin color was normal.  There were no skin lesions.  Hair and nails were normal.  NEUROLOGIC: The patient was alert and oriented to person, place, time, and circumstance. Speech was normal. Cranial nerves were normal. Motor strength was normal for age. The patient was normally coordinated.  PSYCHIATRIC:  Mood and affect were normal and the patient had normal recent and remote memory. The patient's judgment and insight were normal.    ADDITIONAL VITAL SIGNS: Pulse 78  CHEST WALL/BREASTS: Normal male  RECTAL: Not checked  GENITAL/URINARY: Normal male     Additional Tests   Laboratory: CBC  and basic metabolic profile pending    Radiology: Not done    Electrocardiogram: Normal sinus rhythm with occasional PAC.  No evidence of any ischemic changes or significant arrhythmia.My  Interpretation    Total time spent with the patient today was 40 minutes of which > 50% was spent in counseling and coordination of care     Memo Martinez MD  Internal Medicine  Contact me at [phone]

## 2021-06-22 NOTE — ANESTHESIA PROCEDURE NOTES
Spinal Block    Patient location during procedure: OR  Start time: 1/7/2019 9:11 AM  End time: 1/7/2019 9:16 AM  Reason for block: primary anesthetic    Staffing:  Performing  Anesthesiologist: Omer Jeronimo MD    Preanesthetic Checklist  Completed: patient identified, risks, benefits, and alternatives discussed, timeout performed, consent obtained, airway assessed, oxygen available, suction available, emergency drugs available and hand hygiene performed  Spinal Block  Patient position: sitting  Prep: ChloraPrep and site prepped and draped  Patient monitoring: heart rate, cardiac monitor, continuous pulse ox and blood pressure  Approach: midline  Location: L4-5  Injection technique: single-shot  Needle type: Quincke   Needle gauge: 22 G    Assessment  Sensory level: T8    Additional Notes:  3cc 1% lidocaine, +csf .   Unable to pass bony structure at L3/4

## 2021-06-22 NOTE — ANESTHESIA PREPROCEDURE EVALUATION
Anesthesia Evaluation      Patient summary reviewed   No history of anesthetic complications     Airway   Mallampati: II  Neck ROM: full   Pulmonary     breath sounds clear to auscultation  (+) a smoker  (-) asthma, shortness of breath, recent URI, sleep apnea                         Cardiovascular   (+) hypertension, , hypercholesterolemia,     (-) angina  ECG reviewed  Rhythm: regular  Rate: normal,         Neuro/Psych    (+) CVA ,     Endo/Other    (-) no diabetes     GI/Hepatic/Renal            Dental    (+) caps                       Anesthesia Plan  Planned anesthetic: spinal  sapheous AC and selective tibial blocks for postop pain per surgeon request     Off Xarelto 9 days   ASA 3     Anesthetic plan and risks discussed with: patient  Anesthesia plan special considerations: antiemetics,   Post-op plan: routine recovery

## 2021-06-22 NOTE — ANESTHESIA PROCEDURE NOTES
Peripheral Block    Patient location during procedure: pre-op  Start time: 1/7/2019 8:21 AM  End time: 1/7/2019 8:23 AM  post-op analgesia per surgeon order as noted in medical record  Staffing:  Performing  Anesthesiologist: Omer Jeronimo MD  Preanesthetic Checklist  Completed: patient identified, site marked, risks, benefits, and alternatives discussed, timeout performed, consent obtained, airway assessed, oxygen available, suction available, emergency drugs available and hand hygiene performed  Peripheral Block  Block type: other, tibial  Prep: ChloraPrep  Patient position: supine  Patient monitoring: cardiac monitor, continuous pulse oximetry, heart rate and blood pressure  Laterality: right  Injection technique: ultrasound guided    Ultrasound used to visualize needle placement in proximity to nerve being blocked: yes   Permanent ultrasound image captured for medical record  Sterile gel and probe cover used for ultrasound.    Needle  Needle type: echogenic   Needle gauge: 20G  Needle length: 6 in  no peripheral nerve catheter placed  Assessment  Injection assessment: no difficulty with injection, negative aspiration for heme, no paresthesia on injection and incremental injection

## 2021-06-23 NOTE — PROGRESS NOTES
OFFICE VISIT NOTE    Subjective:   Chief Complaint:  Follow-up (hypotension and TKA)    78-year-old man who is now 5 weeks post right total knee replacement.  He is doing much better.  He is finishing therapy now without too much discomfort.  His anxiety is much improved.  We have been holding the medication amlodipine and losartan because of borderline blood pressures.  He has had no near syncope.    Current Outpatient Medications   Medication Sig     acetaminophen (TYLENOL) 500 MG tablet Take 2 tablets (1,000 mg total) by mouth every 8 (eight) hours. Or take 1 tab (500mg) by mouth every 4 hours. Do not exceed 4000mg acetaminophen in 24 hours from all sources.     amLODIPine (NORVASC) 5 MG tablet Take 1 tablet (5 mg total) by mouth daily.     carboxymethylcellulose-glycerin (REFRESH OPTIVE) 0.5-0.9 % Drop Administer 1 drop to both eyes 2 (two) times a day as needed.     citalopram (CELEXA) 10 MG tablet Take 1 tablet (10 mg total) by mouth daily.     docusate sodium (COLACE) 100 MG capsule Take 100 mg by mouth 2 (two) times a day.     ferrous sulfate 325 (65 FE) MG tablet Take 1 tablet (325 mg total) by mouth daily with breakfast. Take the medication for a month     levothyroxine (SYNTHROID, LEVOTHROID) 75 MCG tablet Take 1 tablet (75 mcg total) by mouth daily.     LORazepam (ATIVAN) 0.5 MG tablet Take 0.5 tablets (0.25 mg total) by mouth 2 (two) times a day as needed for anxiety.     losartan (COZAAR) 50 MG tablet Take 1 tablet (50 mg total) by mouth daily.     omeprazole (PRILOSEC) 20 MG capsule Take 20 mg by mouth 2 (two) times a day before meals.     oxyCODONE (ROXICODONE) 5 MG immediate release tablet Take 2.5 mg by mouth every 4 (four) hours as needed.            polyethylene glycol (MIRALAX) 17 gram packet Take 1 packet (17 g total) by mouth daily as needed.     rivaroxaban (XARELTO) 20 mg Tab Take 0.5 tablets (10 mg total) by mouth daily with supper.     saliva stimulant (BIOTENE ORAL BALANCE) liquid Take by  "mouth as needed.     simvastatin (ZOCOR) 20 MG tablet Take 1 tablet (20 mg total) by mouth at bedtime.       Review of Systems:  A comprehensive review of systems is negative except for the comments above    Objective:    Ht 5' 10\" (1.778 m)   Wt 195 lb (88.5 kg)   BMI 27.98 kg/m    GENERAL: No acute distress.  Today's blood pressure is 118/68.  Pulse 76.  Oxygen saturations 98%.  He looks much better.  He is not nearly as emotional as last time lungs are clear.  Heart shows a sinus rhythm without murmur or any ectopic beats.  Right knee replacement looks good.  Incisions healed and there is no signs of any cellulitis or infection.  No signs of any phlebitis.    Assessment & Plan   Michael Maldonado is a 78 y.o. male.    Stable 5 weeks post total knee replacement.  I am going to continue to hold his blood pressure meds since he had mild orthostatic hypotension 2 weeks ago.  Return to clinic in 4 weeks.  At that point we should be able to discontinue the medication Xarelto.  At that point we will probably also restart his blood pressure medications    Diagnoses and all orders for this visit:    Status post total knee replacement, right    Orthostatic dizziness        Memo Martinez MD  Transcription using voice recognition software, may contain typographical errors.    "

## 2021-06-23 NOTE — PROGRESS NOTES
OFFICE VISIT NOTE    Subjective:   Chief Complaint:  Hospital Visit Follow Up (post op TKA)    38-year-old man who is 7 post right total knee replacement.  He was discharged home after 3 days.  Still having a great deal of pain.  He was sent home on tramadol but had to go to urgent care because of pain.  They switched him to oxycodone.  Along with that, he is taking stool softeners.  He continues to have periods of the day when he is miserable.  There are no cardiopulmonary symptoms.  No fevers.    Current Outpatient Medications   Medication Sig     acetaminophen (TYLENOL) 500 MG tablet Take 2 tablets (1,000 mg total) by mouth every 8 (eight) hours. Or take 1 tab (500mg) by mouth every 4 hours. Do not exceed 4000mg acetaminophen in 24 hours from all sources.     amLODIPine (NORVASC) 5 MG tablet Take 1 tablet (5 mg total) by mouth daily.     carboxymethylcellulose-glycerin (REFRESH OPTIVE) 0.5-0.9 % Drop Administer 1 drop to both eyes 2 (two) times a day as needed.     docusate sodium (COLACE) 100 MG capsule Take 1 capsule (100 mg total) by mouth 2 (two) times a day as needed.     hydrOXYzine pamoate (VISTARIL) 25 MG capsule Take 1 capsule (25 mg total) by mouth 4 (four) times a day as needed for itching.     levothyroxine (SYNTHROID, LEVOTHROID) 75 MCG tablet Take 1 tablet (75 mcg total) by mouth daily.     losartan (COZAAR) 50 MG tablet Take 1 tablet (50 mg total) by mouth daily.     omeprazole (PRILOSEC) 20 MG capsule Take 1 capsule (20 mg total) by mouth daily.     polyethylene glycol (MIRALAX) 17 gram packet Take 1 packet (17 g total) by mouth daily as needed.     rivaroxaban (XARELTO) 20 mg Tab Take 0.5 tablets (10 mg total) by mouth daily with supper.     saliva stimulant (BIOTENE ORAL BALANCE) liquid Take by mouth as needed.     simvastatin (ZOCOR) 20 MG tablet Take 1 tablet (20 mg total) by mouth at bedtime.     traMADol (ULTRAM) 50 mg tablet Take 1 tablet (50 mg total) by mouth every 6 (six) hours as needed  for pain.       PSFHx: Tobacco Status:  He  reports that he quit smoking about 19 years ago. His smoking use included cigars. he has never used smokeless tobacco.    Review of Systems:  A comprehensive review of systems is negative except for the comments above    Objective:    There were no vitals taken for this visit.  GENERAL: No acute distress.  Zahra was easily upset begins to cry because he is upset he is having pain.  His vital signs are stable.  Blood pressure is 122/68.  Pulse is 100.  Oxygen saturations 94%.  Wound not inspected by me.  The leg itself is not red nor is it hot.  The dressing seems to be intact.  No unusual moderate drainage around the dressing.  Lungs are clear.  Heart does show a regular rhythm without ectopic beats or significant murmur.  Neurologic exam is normal.  Still has evidence of some superficial phlebitis behind the right knee and calf region.  Ultrasound preop is not shown any evidence of deep vein thrombophlebitis.  He is taking his blood thinner.  Currently, Xarelto 10 mg per orthopedic    Assessment & Plan   Michael Maldonado is a 78 y.o. male.    1 week post op right total knee replacement.  Patient with significant pain yet.  Continue oxycodone 5 mg every 4 hours as needed.  Along with this he can try Vistaril 25 mg every 6 hours to see if we can potentiate the effect of the narcotic.  Stool softeners and laxatives are to be continued.  I anticipate he is going to need opioids for probably about 2 weeks total.  30 minutes spent with patient and wife.  Diagnoses and all orders for this visit:    Status post total knee replacement, right    Essential hypertension    Postoperative pain    Other orders  -     hydrOXYzine pamoate (VISTARIL) 25 MG capsule  Dispense: 30 capsule; Refill: 1            Memo Martinez MD  Transcription using voice recognition software, may contain typographical errors.

## 2021-06-23 NOTE — TELEPHONE ENCOUNTER
Home PT calling with BP numbers    While sitting his bp was 126/66 pulse was 89    While standing his blood pressure dropped to 60/40 recheck was 80/44 this was checked around 9:14 today    Still having light headed dizzy symptoms.    Currently taking losartan 50mg daily    Amlodipine 5 mg daily      Symptoms seem to only happen in the morning    Pt is not drinking enough water    No chest pain    No shortness of breath    Provider paged.    Lorena Alva RN  [facility] Medication Refill and Triage Nurse  1/26/2019  10:39 AM      Reason for Disposition    [1] Fall in systolic BP > 20 mm Hg from normal AND [2] dizzy, lightheaded, or weak    Protocols used: LOW BLOOD PRESSURE-A-AH

## 2021-06-23 NOTE — TELEPHONE ENCOUNTER
Push fluids    Hold amlodipine until appointment on 1/30/19.    Called and spoke with wife and they will follow the instructions.    Lorena Alva RN  [facility] Medication Refill and Triage Nurse  1/26/2019  10:46 AM

## 2021-06-23 NOTE — TELEPHONE ENCOUNTER
Prior Authorization Request  Who s requesting:  Pharmacy   Pharmacy Name and Location: [facility]   pharmacy   Medication Name: Lorazepam   Insurance Plan: [employer]   Insurance Member ID Number:  [id number]  Informed patient that prior authorizations can take up to 10 business days for response:   No  Okay to leave a detailed message:yes

## 2021-06-23 NOTE — PROGRESS NOTES
TCM DISCHARGE FOLLOW UP CALL    Discharge Date:  1/10/2019  Reason for hospital stay (discharge diagnosis)::  TKA  Are you feeling better, the same or worse since your discharge?:  Patient is feeling worse (Went to [facility] yesterday, started on Oxy 5 mg. A lot of pain in surgical knee. Sleeping poorly. Small  BM )  Why are you feeling worse?:  Pt sobbing because knee pain is so severe. He went to [facility] yesterday and started Oxycodone 5 mg q4hrs. Wife is giving Tylenol 2 tabs between oxy. Wife is icing knee. Instructed to ice 20 min, let knee warm then reapply ice. Pt is having dizzy spells when up. He is using walker.  Do you feel like you have a plan in the event of a health emergency?: Yes (Relies on his wife. Instructed to call ortho for knee related issues, call clinic for  any other health issues.)    As part of your discharge plan, were  home care services ordered for you?: No    Did you receive any new medications, or was there a change to your medications?: Yes    Are you taking those medications, or do you have any established regiment?:  Xarelto for anticoagulation.   Do you have any follow up visits scheduled with your PCP or Specialist?:  Yes, with PCP (1/14)

## 2021-06-23 NOTE — PROGRESS NOTES
OFFICE VISIT NOTE    Subjective:   Chief Complaint:  Follow-up (was at  under observation had SOB/dizzy/ lightheadness. you held amlodipine 1/26/19)    This is a 78-year-old right total knee replacement.  He has had a lot of problems postop.  Majority of which are related to anxiety, low blood pressure, and ongoing pain.  He is postop knee pain is finally starting to subside.  He is reduced his oxycodone to 2.5 mg every 4-6 hours as needed.  Blood pressures been low.  He is weak and dizzy when he standing.  Amlodipine was stopped 2 days ago he still running a low blood pressure.  He feels short of breath when he is walking.  He had repeat CT scans and shows no evidence of any pulmonary emboli.    Current Outpatient Medications   Medication Sig     acetaminophen (TYLENOL) 500 MG tablet Take 2 tablets (1,000 mg total) by mouth every 8 (eight) hours. Or take 1 tab (500mg) by mouth every 4 hours. Do not exceed 4000mg acetaminophen in 24 hours from all sources.     carboxymethylcellulose-glycerin (REFRESH OPTIVE) 0.5-0.9 % Drop Administer 1 drop to both eyes 2 (two) times a day as needed.     citalopram (CELEXA) 10 MG tablet Take 1 tablet (10 mg total) by mouth daily.     docusate sodium (COLACE) 100 MG capsule Take 100 mg by mouth 2 (two) times a day.     ferrous sulfate 325 (65 FE) MG tablet Take 1 tablet (325 mg total) by mouth daily with breakfast. Take the medication for a month     levothyroxine (SYNTHROID, LEVOTHROID) 75 MCG tablet Take 1 tablet (75 mcg total) by mouth daily.     losartan (COZAAR) 50 MG tablet Take 1 tablet (50 mg total) by mouth daily.     omeprazole (PRILOSEC) 20 MG capsule Take 20 mg by mouth 2 (two) times a day before meals.     oxyCODONE (ROXICODONE) 5 MG immediate release tablet Take 2.5 mg by mouth every 4 (four) hours as needed.            polyethylene glycol (MIRALAX) 17 gram packet Take 1 packet (17 g total) by mouth daily as needed.     rivaroxaban (XARELTO) 20 mg Tab Take 0.5 tablets  "(10 mg total) by mouth daily with supper.     saliva stimulant (BIOTENE ORAL BALANCE) liquid Take by mouth as needed.     simvastatin (ZOCOR) 20 MG tablet Take 1 tablet (20 mg total) by mouth at bedtime.     amLODIPine (NORVASC) 5 MG tablet Take 1 tablet (5 mg total) by mouth daily.     LORazepam (ATIVAN) 0.5 MG tablet Take 0.5 tablets (0.25 mg total) by mouth 2 (two) times a day as needed for anxiety.       PSFHx: Tobacco Status:  He  reports that he quit smoking about 19 years ago. His smoking use included cigars. he has never used smokeless tobacco.    Review of Systems:  A comprehensive review of systems is negative except for the comments above    Objective:    BP 96/56   Ht 5' 10\" (1.778 m)   Wt 196 lb (88.9 kg)   BMI 28.12 kg/m    GENERAL: No acute distress.  On exam his weight is up 2 pounds.  Blood pressure is 106/62.  When he stands it drops to 80/58.  Oxygen saturations 99.  Pulse 75-80.  Right knee is examined and there is no evidence of any cellulitis or infection of the wound.  He does have some ecchymosis in the right thigh yet.  His lungs are clear.  Heart exam shows a sinus rhythm without a beats.  Cranial nerves seem intact.  Mentally he is rather fragile and will cry easily when you question him regarding his symptoms.    Assessment & Plan   Michael Maldonado is a 78 y.o. male.    Much of his weakness lightheadedness and dyspnea related to what I think is volume depletion and orthostatic hypotension.  He will hold the medication losartan as well as continue to hold amlodipine.  Push fluids and liberalize salt a little over the 2 days.  Blood pressure should begin the,.  Continue physical therapy.  I will see him again in 2 weeks.  In the hospital he was started on citalopram 10 mg as well as lorazepam 5 mg.  The lorazepam should definitely be a short-term med only.    Diagnoses and all orders for this visit:    Anxiety    Status post total knee replacement, right    Hypotension due to " drugs        The following high BMI interventions were performed this visit: encouragement to exercise    Memo Martinez MD  Transcription using voice recognition software, may contain typographical errors.

## 2021-06-23 NOTE — TELEPHONE ENCOUNTER
"[facility] Transition of Care Encounter  Assessment & Plan                                                     Right TKA: Stable, maintaining pain regimen. Reviewed bleeding risk with ketorolac + Xarelto -- wife plans on finishing up ketorolac then avoiding NSAIDs while he is on Xarelto. Recommended using APAP 1000 mg three times a day scheduled one he is done with ketorolac.     Hypertension: BP well controlled, continue to monitor.     Hyperlipidemia:Stable. Appropriate dose with concomitant amlodipine. Recommended to continue current regimen.     Hypothyroidism: TSH within normal limits.       Follow Up  As needed with MT     Subjective & Objective                                                       Michael Maldonado is a 78 y.o. male called for a transitions of care visit. he was discharged from [facility] on 1/10/19 for right total knee arthroplasty. Spoke to wife Barbie (consent to communicate on file)    Chief Complaint: feeling \"so so.\"     Medication Adherence/Access: wife is managing his medications.     Right TKA: Currently taking Xarelto 10 mg daily x 24 days, ketorolac 10 mg every 6 hours (#3 pills left per [facility]), oxycodone 5 every 4 hours and hydroxyzine 25 mg every 6 hours. Right now is not taking APAP. Also on docusate 100 mg two times a day PRN -- having BMs, is not needing Miralax. Tramadol was not helpful. Ketorolac is helpful - taking with water and food. Oxycodone on its own was not as helpful, but addition of hydroxyzine helped. Does make him fatigued.   Reports seen by [facility] yesterday and bandage was removed and rewrapped.   No significant bleeding.     Hypertension: Currently taking amlodipine 5 mg daily and losartan 50 mg daily. Patient does monitor BP at home and it was similar to clinic reading from yesterday. No recent lightheadedness/dizziness except after first starting oxy, denies edema.     Hyperlipidemia: Currently taking simvastatin 20 mg HS. Denies myalgias. Last lipids checked " 1/15/18.    Hypothyroidism: Currently taking levothyroxine 75 mcg.  Last TSH checked 8/15/18.  Current symptoms: none .       PMH: reviewed in EPIC   Allergies/ADRs: reviewed in EPIC   Alcohol: reviewed in EPIC   Tobacco:   Social History     Tobacco Use   Smoking Status Former Smoker     Types: Cigars     Last attempt to quit: 2000     Years since quittin.0   Smokeless Tobacco Never Used     Recent Vitals:   BP Readings from Last 3 Encounters:   19 122/68   01/10/19 141/81   18 134/80      Wt Readings from Last 3 Encounters:   19 198 lb 9 oz (90.1 kg)   18 206 lb (93.4 kg)   18 195 lb (88.5 kg)     ----------------    Much or all of the text in this note was generated through the use of Dragon Dictate voice-to-text software. Errors in spelling or words which seem out of context are unintentional. Sound alike errors, in particular, may have escaped editing.    The patient declined an after visit summary    I spent 15 minutes with this patient today;  . All changes were made via collaborative practice agreement with Memo Martinez MD. A copy of the visit note was provided to the patient's provider.     Elke Rico, Pharm.D., [facility]  Medication Therapy Management Pharmacist  [facility]     Current Outpatient Medications   Medication Sig Dispense Refill     acetaminophen (TYLENOL) 500 MG tablet Take 2 tablets (1,000 mg total) by mouth every 8 (eight) hours. Or take 1 tab (500mg) by mouth every 4 hours. Do not exceed 4000mg acetaminophen in 24 hours from all sources.  0     amLODIPine (NORVASC) 5 MG tablet Take 1 tablet (5 mg total) by mouth daily. 90 tablet 3     carboxymethylcellulose-glycerin (REFRESH OPTIVE) 0.5-0.9 % Drop Administer 1 drop to both eyes 2 (two) times a day as needed.       docusate sodium (COLACE) 100 MG capsule Take 1 capsule (100 mg total) by mouth 2 (two) times a day as needed. 60 capsule 0     hydrOXYzine pamoate (VISTARIL) 25 MG  capsule Take 1 capsule (25 mg total) by mouth 4 (four) times a day as needed for itching. 30 capsule 1     levothyroxine (SYNTHROID, LEVOTHROID) 75 MCG tablet Take 1 tablet (75 mcg total) by mouth daily. 90 tablet 3     losartan (COZAAR) 50 MG tablet Take 1 tablet (50 mg total) by mouth daily. 90 tablet 3     omeprazole (PRILOSEC) 20 MG capsule Take 1 capsule (20 mg total) by mouth daily. 90 capsule 3     polyethylene glycol (MIRALAX) 17 gram packet Take 1 packet (17 g total) by mouth daily as needed.  0     rivaroxaban (XARELTO) 20 mg Tab Take 0.5 tablets (10 mg total) by mouth daily with supper. 12 tablet 0     saliva stimulant (BIOTENE ORAL BALANCE) liquid Take by mouth as needed.       simvastatin (ZOCOR) 20 MG tablet Take 1 tablet (20 mg total) by mouth at bedtime. 90 tablet 3     traMADol (ULTRAM) 50 mg tablet Take 1 tablet (50 mg total) by mouth every 6 (six) hours as needed for pain. 40 tablet 0     No current facility-administered medications for this visit.

## 2021-06-23 NOTE — TELEPHONE ENCOUNTER
Clinic Care Coordination Contact    Clinic Care Coordination Contact  OUTREACH    Referral Information:  Referral Source: IP Report    Primary Diagnosis: Orthopedic    Chief Complaint   Patient presents with     Clinic Care Coordination - Post Hospital     DCd from [facility] on 1/10/19      From chart review:  Admission Date: 1/7/2019  Admission Diagnoses: OA (osteoarthritis) of knee [M17.10]   Discharge Date:  1/10/19  Post-operative Day:  3 Days Post-Op  Reason for Admission: The patient was admitted for the following:  RIGHT TOTAL KNEE  ARTHROPLASTY (Right)     Brief Hospital Course: This 78 y.o. male underwent the aforementioned procedure with Dr. Page on 1/7/19. There were no intraoperative complications and the patient was transferred to the recovery room and later the orthopedic unit in stable condition. Once the patient reached the orthopedic floor our orthopedic pain protocol was implemented along with the following:  Therapy: Physical Therapy and Occupational Therapy  Anticoagulation Medications: Xarelto      Clinical Concerns:  Current Medical Concerns: Called patient and he handed phone to spouse. He had outpatient therapy this morning and the therapist looked at his knee and no concerns. His pain is under control. Wife is giving pain medication.  This is the second knee replacement he has had, so they know what to expect. Has therapy sessions set up until March. Has OV with PCP and surgeon already scheduled.  He had a dizzy spell when he was up for breakfast, but it resolved and he ate some yogurt before therapy.  She will watch for constipation.        Current Behavioral Concerns: none identified.     Education Provided to patient: none provided  Pain  Pain (GOAL):: No  Health Maintenance Reviewed: Due/Overdue       Medication Management:  Wife is administering. No concerns.      Functional Status:  Dependent ADLs:: Independent, Ambulation-walker  Dependent IADLs:: Transportation, Cleaning,  Cooking, Laundry, Shopping, Meal Preparation, Medication Management  Bed or wheelchair confined:: No  Mobility Status: Independent w/Device  Fallen 2 or more times in the past year?: No  Any fall with injury in the past year?: Yes    Living Situation:  Current living arrangement:: I live in a private home with spouse  Type of residence:: [facility]    Diet/Exercise/Sleep:  Diet:: Regular  Inadequate nutrition (GOAL):: No  Food Insecurity: No  Tube Feeding: No  Exercise:: Currently not exercising  Inadequate activity/exercise (GOAL):: No  Significant changes in sleep pattern (GOAL): No    Transportation:  Transportation concerns (GOAL):: No  Transportation means:: Regular car, Family     Psychosocial:  Mental health DX:: No  Mental health management concern (GOAL):: No  Informal Support system:: Spouse     Financial/Insurance:   Financial/Insurance concerns (GOAL):: No  [employer], no financial concerns.      Resources and Interventions:  Current Resources:    ;   Community Resources: Acute Rehab  Supplies used at home:: None  Equipment Currently Used at Home: walker, rolling    Advance Care Plan/Directive  Advanced Care Plans/Directives on file:: Yes  Type Advanced Care Plans/Directives: Advanced Directive - On File  Advanced Care Plan/Directive Status: Declined Further Information    Referrals Placed: None    Patient/Caregiver understanding: Patient and his wife have things under control. They will call [place] or PCP with any concerns.  No need for ongoing care coordination.  They received letter from CC one month ago and still have it. Will call CC if needs arise.     Plan: No further outreach by this CC.     Safia Wong,   [facility]  [email or web address]  [phone]

## 2021-06-23 NOTE — TELEPHONE ENCOUNTER
Call was made for hospital discharge follow up. Pt was sobbing because pain is so severe. He went to [facility] yesterday. Analgesic was changed to oxycodone 5 mr Q4hrs prn. Wife is giving Tylenol between oxy doses. He is getting some relief. He is icing knee.   Pt has an appt with Dr Martinez this afternoon. Please consider adding hydroxyzine for pain mgmt.

## 2021-06-24 NOTE — PROGRESS NOTES
OFFICE VISIT NOTE    Subjective:   Chief Complaint:  Follow-up    This is a 78-year-old man in for follow-up regarding recent right total knee replacement, retention, anxiety, hypothyroidism.  He is making slow but steady progress.  Needs to continue with physical therapy to increase his range of motion of the knee joint.  He still requires oxycodone before physical therapy.  He is being followed closely by his orthopedist.  He is now off the medication Xarelto.  He had a very limited DVT prior to his surgery.    Current Outpatient Medications   Medication Sig     acetaminophen (TYLENOL) 500 MG tablet Take 2 tablets (1,000 mg total) by mouth every 8 (eight) hours. Or take 1 tab (500mg) by mouth every 4 hours. Do not exceed 4000mg acetaminophen in 24 hours from all sources.     amLODIPine (NORVASC) 5 MG tablet Take 1 tablet (5 mg total) by mouth daily.     carboxymethylcellulose-glycerin (REFRESH OPTIVE) 0.5-0.9 % Drop Administer 1 drop to both eyes 2 (two) times a day as needed.     citalopram (CELEXA) 10 MG tablet Take 1 tablet (10 mg total) by mouth daily.     docusate sodium (COLACE) 100 MG capsule Take 100 mg by mouth 2 (two) times a day.     ferrous sulfate 325 (65 FE) MG tablet Take 1 tablet (325 mg total) by mouth daily with breakfast. Take the medication for a month     levothyroxine (SYNTHROID, LEVOTHROID) 75 MCG tablet Take 1 tablet (75 mcg total) by mouth daily.     LORazepam (ATIVAN) 0.5 MG tablet Take 0.5 tablets (0.25 mg total) by mouth 2 (two) times a day as needed for anxiety.     losartan (COZAAR) 50 MG tablet Take 1 tablet (50 mg total) by mouth daily.     omeprazole (PRILOSEC) 20 MG capsule Take 20 mg by mouth 2 (two) times a day before meals.     oxyCODONE (ROXICODONE) 5 MG immediate release tablet Take 2.5 mg by mouth every 4 (four) hours as needed.            polyethylene glycol (MIRALAX) 17 gram packet Take 1 packet (17 g total) by mouth daily as needed.     rivaroxaban (XARELTO) 20 mg Tab  "Take 0.5 tablets (10 mg total) by mouth daily with supper.     saliva stimulant (BIOTENE ORAL BALANCE) liquid Take by mouth as needed.     simvastatin (ZOCOR) 20 MG tablet Take 1 tablet (20 mg total) by mouth at bedtime.       PSFHx: Tobacco Status:  He  reports that he quit smoking about 19 years ago. His smoking use included cigars. he has never used smokeless tobacco.    Review of Systems:  A comprehensive review of systems is negative except for the comments above    Objective:    Ht 5' 10\" (1.778 m)   Wt 195 lb (88.5 kg)   BMI 27.98 kg/m    GENERAL: No acute distress.  He looks good.  Blood pressure is 126/86.  Pulse is 70.  Oxygen saturations 90%.  No significant edema.  Leg wound looks great without signs of any infection.  Lungs are clear.  Heart shows a regular rhythm without murmur gallop or rub.  Memory somewhat impaired.    Assessment & Plan   Michael Maldonado is a 78 y.o. male.    Medically he is stable.  He needs to continue physical therapy.  I am going to continue to hold off his blood pressure meds since his pressure is normal without them.  Return to clinic in 4 weeks.  The pressure should rise, consider restarting a medicine such as losartan.  He will continue his thyroid replacement.  Continue Zocor etc.    Diagnoses and all orders for this visit:    Status post total knee replacement, right    Hypothyroidism due to acquired atrophy of thyroid    Essential hypertension        The following high BMI interventions were performed this visit: encouragement to exercise    Memo Martinez MD  Transcription using voice recognition software, may contain typographical errors.    "

## 2021-06-25 ENCOUNTER — COMMUNICATION - HEALTHEAST (OUTPATIENT)
Dept: INTERNAL MEDICINE | Facility: CLINIC | Age: 81
End: 2021-06-25

## 2021-06-25 DIAGNOSIS — I10 ESSENTIAL HYPERTENSION: ICD-10-CM

## 2021-06-25 NOTE — TELEPHONE ENCOUNTER
Refill Request  Medication name:   Requested Prescriptions     Pending Prescriptions Disp Refills     amLODIPine (NORVASC) 5 MG tablet 90 tablet 3     Sig: Take 1 tablet (5 mg total) by mouth daily.     Who prescribed the medication: DEENA  Last refill on medication: 05/06/2020  Requested Pharmacy: [facility]  Last appointment with PCP: 04/07/2021  Next appointment: Not due    Sivakumar Davies, RT (R)

## 2021-06-28 NOTE — PROGRESS NOTES
Progress Notes by Emilee Blackmon PA-C at 12/10/2019 11:10 AM     Author: Emilee Blackmon PA-C Service: -- Author Type: Physician Assistant    Filed: 12/10/2019 11:46 AM Encounter Date: 12/10/2019 Status: Signed    : Emilee Blackmon PA-C (Physician Assistant)         Assessment:       1. Viral gastroenteritis       Medical Decision Making  Patient presents with acute onset emesis with appropriate improvement.  Feel he most likely is suffering from a viral gastroenteritis that should continue to improve on its own.      Plan:       Recommended plenty of fluids.  Educational materials provided and appropriate diet discussed.  Discussed signs of worsening symptoms and when to follow-up with PCP if no symptom improvement.      Patient Instructions   Patient Education     Viral Gastroenteritis (Adult)    Gastroenteritis is commonly called the stomach flu. It is most often caused by a virus that affects the stomach and intestinal tract and usually lasts from 2 to 7 days. Common viruses causing gastroenteritis include norovirus, rotavirus, and hepatitis A. Non-viral causes of gastroenteritis include bacteria, parasites, and toxins.  The danger from repeated vomiting or diarrhea is dehydration. This is the loss of too much fluid from the body. When this occurs, body fluids must be replaced. Antibiotics do not help with this illness because it is usually viral.Simple home treatment will be helpful.  Symptoms of viral gastroenteritis may include:    Watery, loose stools    Stomach pain or abdominal cramps    Fever and chills    Nausea and vomiting    Loss of bowel control    Headache  Home care  Gastroenteritis is transmitted by contact with the stool or vomit of an infected person. This can occur from person to person or from contact with a contaminated surface.  Follow these guidelines when caring for yourself at home:    If symptoms are severe, rest at home for the next 24 hours or until you are feeling  better.    Wash your hands with soap and water or use alcohol-based  to prevent the spread of infection. Wash your hands after touching anyone who is sick.    Wash your hands or use alcohol-based  after using the toilet and before meals. Clean the toilet after each use.  Remember these tips when preparing food:    People with diarrhea should not prepare or serve food to others. When preparing foods, wash your hands before and after.    Wash your hands after using cutting boards, countertops, knives, or utensils that have been in contact with raw food.    Keep uncooked meats away from cooked and ready-to-eat foods.  Medicine  You may use acetaminophen or NSAID medicines like ibuprofen or naproxen to control fever unless another medicine was given. If you have chronic liver or kidney disease, talk with your healthcare provider before using these medicines. Also talk with your provider if you've had a stomach ulcer or gastrointestinal bleeding. Don't give aspirin to anyone under 18 years of age who is ill with a fever. It may cause severe liver damage. Don't use NSAIDS is you are already taking one for another condition (like arthritis) or are on aspirin (such as for heart disease or after a stroke).  If medicine for vomiting or diarrhea are prescribed, take these only as directed. Do not take over-the-counter medicines for vomiting or diarrhea unless instructed by your healthcare provider.  Diet  Follow these guidelines for food:    Water and liquids are important so you don't get dehydrated. Drink a small amount at a time or suck on ice chips if you are vomiting.    If you eat, avoid fatty, greasy, spicy, or fried foods.    Don't eat dairy if you have diarrhea. This can make diarrhea worse.    Avoid tobacco, alcohol, and caffeine which may worsen symptoms.  During the first 24 hours (the first full day), follow the diet below:    Beverages. Sports drinks, soft drinks without caffeine, ginger ale,  mineral water (plain or flavored), decaffeinated tea and coffee. If you are very dehydrated, sports drinks aren't a good choice. They have too much sugar and not enough electrolytes. In this case, commercially available products called oral rehydration solutions, are best.    Soups. Eat clear broth, consommé, and bouillon.    Desserts. Eat gelatin, popsicles, and fruit juice bars.  During the next 24 hours (the second day), you may add the following to the above:    Hot cereal, plain toast, bread, rolls, and crackers    Plain noodles, rice, mashed potatoes, chicken noodle or rice soup    Unsweetened canned fruit (avoid pineapple), bananas    Limit fat intake to less than 15 grams per day. Do this by avoiding margarine, butter, oils, mayonnaise, sauces, gravies, fried foods, peanut butter, meat, poultry, and fish.    Limit fiber and avoid raw or cooked vegetables, fresh fruits (except bananas), and bran cereals.    Limit caffeine and chocolate. Don't use spices or seasonings other than salt.    Limit dairy products.    Avoid alcohol.  During the next 24 hours:    Gradually resume a normal diet as you feel better and your symptoms improve.    If at any time it starts getting worse again, go back to clear liquids until you feel better.  Follow-up care  Follow up with your healthcare provider, or as advised. Call your provider if you don't get better within 24 hours or if diarrhea lasts more than a week. Also follow up if you are unable to keep down liquids and get dehydrated. If a stool (diarrhea) sample was taken, call as directed for the results.  Call 911  Call 911 if any of these occur:    Trouble breathing    Chest pain    Confused    Severe drowsiness or trouble awakening    Fainting or loss of consciousness    Rapid heart rate    Seizure    Stiff neck  When to seek medical advice  Call your healthcare provider right away if any of these occur:    Abdominal pain that gets worse    Continued vomiting (unable to  keep liquids down)    Frequent diarrhea (more than 5 times a day)    Blood in vomit or stool (black or red color)    Dark urine, reduced urine output, or extreme thirst    Weakness or dizziness    Drowsiness    Fever of 100.4 F (38 C) or higher, or as directed by your healthcare provider    New rash  Date Last Reviewed: 1/3/2016    9991-0101 [employer]. [address] [place] [address]. All rights reserved. This information is not intended as a substitute for professional medical care. Always follow your healthcare professional's instructions.             Subjective:       History provided by the patient.  He is also here with his wife.  Michael Maldonado is a 79 y.o. male here for evaluation of emesis.  Onset of symptoms was 24 hours ago with gradual improvement since then.  Patient initially developed nausea and a single episode of diarrhea.  He then had several episodes of emesis.  Patient then drank sips of 7-Up and was able to sleep through the night.  He states he feels much better this morning.  He had the same meal as his wife last night and she did not develop similar symptoms.  Otherwise no other known ill contacts.  Patient denies severe abdominal pains, sore throat, hematemesis, hematochezia, melena.    The following portions of the patient's history were reviewed and updated as appropriate: allergies, current medications and problem list.    Review of Systems  Pertinent items are noted in HPI.     Allergies  No Known Allergies    Family History   Problem Relation Age of Onset   ? Cirrhosis Father    ? Diabetes Father        Social History     Socioeconomic History   ? Marital status:      Spouse name: None   ? Number of children: None   ? Years of education: None   ? Highest education level: None   Occupational History   ? None   Social Needs   ? Financial resource strain: None   ? Food insecurity:     Worry: None     Inability: None   ? Transportation needs:     Medical:  None     Non-medical: None   Tobacco Use   ? Smoking status: Former Smoker     Types: Cigars     Last attempt to quit: 2000     Years since quittin.9   ? Smokeless tobacco: Never Used   Substance and Sexual Activity   ? Alcohol use: Yes     Alcohol/week: 1.0 standard drinks     Types: 1 Shots of liquor per week     Comment: nightly   ? Drug use: No   ? Sexual activity: None   Lifestyle   ? Physical activity:     Days per week: None     Minutes per session: None   ? Stress: None   Relationships   ? Social connections:     Talks on phone: None     Gets together: None     Attends Rastafarian service: None     Active member of club or organization: None     Attends meetings of clubs or organizations: None     Relationship status: None   ? Intimate partner violence:     Fear of current or ex partner: None     Emotionally abused: None     Physically abused: None     Forced sexual activity: None   Other Topics Concern   ? None   Social History Narrative    , retired [employer]         Objective:       /72 (Patient Site: Right Arm, Patient Position: Sitting, Cuff Size: Adult Large)   Pulse 84   Temp 97.4  F (36.3  C) (Oral)   Resp 16   Wt 202 lb 12.8 oz (92 kg)   SpO2 98%   BMI 29.10 kg/m    General appearance: alert, appears stated age, cooperative, no distress and non-toxic  Head: Normocephalic, without obvious abnormality, atraumatic  Throat: lips, mucosa, and tongue normal; teeth and gums normal  Back: No CVA tenderness  Lungs: clear to auscultation bilaterally  Heart: regular rate and rhythm, S1, S2 normal, no murmur, click, rub or gallop  Abdomen: soft, non-tender; bowel sounds normal; no masses,  no organomegaly  Skin: Skin color, texture, turgor normal. No rashes or lesions

## 2021-07-03 NOTE — ADDENDUM NOTE
Addendum Note by Steinert, Joy C, CMA at 2/6/2020  2:00 PM     Author: Steinert, Joy C, CMA Service: -- Author Type: Certified Medical Assistant    Filed: 2/6/2020  2:52 PM Encounter Date: 2/6/2020 Status: Signed    : Steinert, Joy C, CMA (Certified Medical Assistant)    Addended by: STEINERT, JOY C on: 2/6/2020 02:52 PM        Modules accepted: Orders

## 2021-07-04 NOTE — ADDENDUM NOTE
Addendum Note by Roseanne Quintana CMA at 4/7/2021 10:03 AM     Author: Roseanne Quintana CMA Service: -- Author Type: Certified Medical Assistant    Filed: 4/7/2021 10:03 AM Encounter Date: 4/4/2021 Status: Signed    : Roseanne Quintana CMA (Certified Medical Assistant)    Addended by: ROSEANNE QUINTANA on: 4/7/2021 10:03 AM        Modules accepted: Orders

## 2021-07-06 ENCOUNTER — TRANSFERRED RECORDS (OUTPATIENT)
Dept: HEALTH INFORMATION MANAGEMENT | Facility: CLINIC | Age: 81
End: 2021-07-06

## 2021-09-11 ENCOUNTER — HEALTH MAINTENANCE LETTER (OUTPATIENT)
Age: 81
End: 2021-09-11

## 2021-09-28 ENCOUNTER — ALLIED HEALTH/NURSE VISIT (OUTPATIENT)
Dept: FAMILY MEDICINE | Facility: CLINIC | Age: 81
End: 2021-09-28
Payer: COMMERCIAL

## 2021-09-28 DIAGNOSIS — Z23 NEED FOR IMMUNIZATION AGAINST INFLUENZA: Primary | ICD-10-CM

## 2021-09-28 PROCEDURE — G0008 ADMIN INFLUENZA VIRUS VAC: HCPCS

## 2021-09-28 PROCEDURE — 99207 PR NO CHARGE NURSE ONLY: CPT

## 2021-09-28 PROCEDURE — 90662 IIV NO PRSV INCREASED AG IM: CPT

## 2021-10-04 ENCOUNTER — IMMUNIZATION (OUTPATIENT)
Dept: NURSING | Facility: CLINIC | Age: 81
End: 2021-10-04
Payer: COMMERCIAL

## 2021-10-04 PROCEDURE — 0004A PR COVID VAC PFIZER DIL RECON 30 MCG/0.3 ML IM: CPT

## 2021-10-04 PROCEDURE — 91300 PR COVID VAC PFIZER DIL RECON 30 MCG/0.3 ML IM: CPT

## 2021-10-18 ENCOUNTER — TRANSFERRED RECORDS (OUTPATIENT)
Dept: HEALTH INFORMATION MANAGEMENT | Facility: CLINIC | Age: 81
End: 2021-10-18

## 2021-10-25 ENCOUNTER — TRANSFERRED RECORDS (OUTPATIENT)
Dept: HEALTH INFORMATION MANAGEMENT | Facility: CLINIC | Age: 81
End: 2021-10-25
Payer: COMMERCIAL

## 2021-12-22 ENCOUNTER — TRANSFERRED RECORDS (OUTPATIENT)
Dept: HEALTH INFORMATION MANAGEMENT | Facility: CLINIC | Age: 81
End: 2021-12-22
Payer: COMMERCIAL

## 2022-01-18 ENCOUNTER — TRANSFERRED RECORDS (OUTPATIENT)
Dept: HEALTH INFORMATION MANAGEMENT | Facility: CLINIC | Age: 82
End: 2022-01-18
Payer: COMMERCIAL

## 2022-01-21 ENCOUNTER — OFFICE VISIT (OUTPATIENT)
Dept: INTERNAL MEDICINE | Facility: CLINIC | Age: 82
End: 2022-01-21
Payer: COMMERCIAL

## 2022-01-21 VITALS
WEIGHT: 205 LBS | HEIGHT: 68 IN | SYSTOLIC BLOOD PRESSURE: 134 MMHG | HEART RATE: 70 BPM | BODY MASS INDEX: 31.07 KG/M2 | OXYGEN SATURATION: 97 % | DIASTOLIC BLOOD PRESSURE: 76 MMHG

## 2022-01-21 DIAGNOSIS — Z00.00 ROUTINE GENERAL MEDICAL EXAMINATION AT A HEALTH CARE FACILITY: Primary | ICD-10-CM

## 2022-01-21 DIAGNOSIS — Z85.89 HISTORY OF HEAD AND NECK CANCER: ICD-10-CM

## 2022-01-21 DIAGNOSIS — E78.2 MIXED HYPERLIPIDEMIA: ICD-10-CM

## 2022-01-21 DIAGNOSIS — E03.4 HYPOTHYROIDISM DUE TO ACQUIRED ATROPHY OF THYROID: ICD-10-CM

## 2022-01-21 LAB
ALBUMIN SERPL-MCNC: 4.2 G/DL (ref 3.5–5)
ALP SERPL-CCNC: 81 U/L (ref 45–120)
ALT SERPL W P-5'-P-CCNC: 22 U/L (ref 0–45)
ANION GAP SERPL CALCULATED.3IONS-SCNC: 12 MMOL/L (ref 5–18)
AST SERPL W P-5'-P-CCNC: 21 U/L (ref 0–40)
BILIRUB SERPL-MCNC: 1 MG/DL (ref 0–1)
BUN SERPL-MCNC: 12 MG/DL (ref 8–28)
CALCIUM SERPL-MCNC: 9.6 MG/DL (ref 8.5–10.5)
CHLORIDE BLD-SCNC: 105 MMOL/L (ref 98–107)
CHOLEST SERPL-MCNC: 159 MG/DL
CO2 SERPL-SCNC: 22 MMOL/L (ref 22–31)
CREAT SERPL-MCNC: 1.19 MG/DL (ref 0.7–1.3)
ERYTHROCYTE [DISTWIDTH] IN BLOOD BY AUTOMATED COUNT: 13.6 % (ref 10–15)
FASTING STATUS PATIENT QL REPORTED: YES
GFR SERPL CREATININE-BSD FRML MDRD: 62 ML/MIN/1.73M2
GLUCOSE BLD-MCNC: 100 MG/DL (ref 70–125)
HCT VFR BLD AUTO: 46.4 % (ref 40–53)
HDLC SERPL-MCNC: 55 MG/DL
HGB BLD-MCNC: 15.1 G/DL (ref 13.3–17.7)
LDLC SERPL CALC-MCNC: 85 MG/DL
MCH RBC QN AUTO: 30.1 PG (ref 26.5–33)
MCHC RBC AUTO-ENTMCNC: 32.5 G/DL (ref 31.5–36.5)
MCV RBC AUTO: 92 FL (ref 78–100)
PLATELET # BLD AUTO: 135 10E3/UL (ref 150–450)
POTASSIUM BLD-SCNC: 4.3 MMOL/L (ref 3.5–5)
PROT SERPL-MCNC: 7 G/DL (ref 6–8)
RBC # BLD AUTO: 5.02 10E6/UL (ref 4.4–5.9)
SODIUM SERPL-SCNC: 139 MMOL/L (ref 136–145)
TRIGL SERPL-MCNC: 96 MG/DL
TSH SERPL DL<=0.005 MIU/L-ACNC: 9.2 UIU/ML (ref 0.3–5)
WBC # BLD AUTO: 3.9 10E3/UL (ref 4–11)

## 2022-01-21 PROCEDURE — 36415 COLL VENOUS BLD VENIPUNCTURE: CPT | Performed by: INTERNAL MEDICINE

## 2022-01-21 PROCEDURE — 80053 COMPREHEN METABOLIC PANEL: CPT | Performed by: INTERNAL MEDICINE

## 2022-01-21 PROCEDURE — 85027 COMPLETE CBC AUTOMATED: CPT | Performed by: INTERNAL MEDICINE

## 2022-01-21 PROCEDURE — 80061 LIPID PANEL: CPT | Performed by: INTERNAL MEDICINE

## 2022-01-21 PROCEDURE — 84443 ASSAY THYROID STIM HORMONE: CPT | Performed by: INTERNAL MEDICINE

## 2022-01-21 PROCEDURE — 99397 PER PM REEVAL EST PAT 65+ YR: CPT | Performed by: INTERNAL MEDICINE

## 2022-01-21 RX ORDER — SIMVASTATIN 80 MG
0.5 TABLET ORAL DAILY
COMMUNITY
Start: 2020-12-15 | End: 2022-02-08

## 2022-01-21 RX ORDER — LEVOTHYROXINE SODIUM 75 UG/1
1 TABLET ORAL DAILY
COMMUNITY
Start: 2020-12-15 | End: 2022-01-24 | Stop reason: DRUGHIGH

## 2022-01-21 RX ORDER — AMLODIPINE BESYLATE 5 MG/1
1 TABLET ORAL DAILY
COMMUNITY
Start: 2021-11-29 | End: 2022-06-08

## 2022-01-21 RX ORDER — LOSARTAN POTASSIUM 50 MG/1
1 TABLET ORAL DAILY
COMMUNITY
Start: 2020-12-15 | End: 2022-01-31

## 2022-01-21 ASSESSMENT — ACTIVITIES OF DAILY LIVING (ADL): CURRENT_FUNCTION: NO ASSISTANCE NEEDED

## 2022-01-21 ASSESSMENT — MIFFLIN-ST. JEOR: SCORE: 1614.37

## 2022-01-21 NOTE — PATIENT INSTRUCTIONS
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  I told him the martini probably contains two hundred fifty or three hundred carbohydrate calories per day, which adds up over the course of the week.  I predict that if he cut out the martinis, he would probably lose 5 pounds in a month and 10 pounds in 2 months.  Hopefully he will be more physically active when he gets to [place]    Wt Readings from Last 5 Encounters:   01/21/22 93 kg (205 lb)   11/12/20 96.1 kg (211 lb 14.4 oz)   08/31/20 95.3 kg (210 lb)   02/06/20 93.4 kg (206 lb)   01/22/20 91.6 kg (202 lb)     Probably too much alcohol consumption, switched daily martini switched to rafael, and he is getting excessive carbohydrate calories from that, plus alcohol adds to blood pressure.      Memory impairment  History of craniotomy for an arteriovenous malformation in 1970, with good long-term recovery.    Could not recall 3 objects today January 21, 2022    Essential hypertension, reasonably well controlled on combination of losartan plus amlodipine.  We will keep his blood pressure medicines the same.  If he can lose the weight and may be reduce the martinis, perhaps he could stop one of his blood pressure medicines.     BP Readings from Last 6 Encounters:    01/21/22 134/76   11/12/20 128/66   08/31/20 138/68   02/06/20 (!) 140/78   01/22/20 138/84   12/10/19 130/72     Hypothyroidism on replacement, on stable levothyroxine dose   11-  TSH 0.30 - 5.00 uIU/mL 4.63      Hyperlipidemia on high-dose simvastatin, lipids reasonably well controlled when last checked January 2020, no history of cardiovascular events.  Cholesterol panel from January 22, 2020 had total cholesterol one seventy-six, HDL a generous sixty-two, well-controlled LDL of eighty-three, triglycerides slightly elevated one fifty-three.     Reduced renal function on a metabolic panel from August 2020, will recheck on this round of testing.  Creatinine was about 1.35, previous levels between 1.1 and 1.2.  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       Actinic keratoses, with lesions on his scalp that need to be examined by dermatology, sees them every 3 months.       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       History of deep venous thrombosis associated with the 2019 knee arthroplasty, no longer needs to be on anticoagulation.        Vaccinations including seasonal flu vaccine, pneumococcus, and shingles.  Booster:   COVID-19,PF,Pfizer (12+ Yrs) 10/04/2021     Immunization History   Administered Date(s) Administered     COVID-19,PF,Pfizer (12+ Yrs) 10/04/2021     Flu, Unspecified 09/21/2015, 10/11/2017     Influenza (High Dose) 3 valent vaccine 1941, 09/27/2016, 09/26/2018, 09/24/2019     Influenza (IIV3) PF 09/01/2010     Influenza, Quad, High Dose, Pf, 65yr+ (Fluzone HD) 09/09/2020, 09/28/2021     Pneumo Conj 13-V (2010&after) 05/20/2015     Pneumococcal 23  valent 01/01/2007, 08/06/2010     Td,adult,historic,unspecified 01/01/2008     Tdap (Adacel,Boostrix) 08/10/2012     Zoster vaccine recombinant adjuvanted (SHINGRIX) 01/24/2019, 09/19/2019     Zoster vaccine, live 04/25/2012

## 2022-01-24 DIAGNOSIS — K21.9 GASTROESOPHAGEAL REFLUX DISEASE WITHOUT ESOPHAGITIS: Primary | ICD-10-CM

## 2022-01-24 RX ORDER — LEVOTHYROXINE SODIUM 88 UG/1
88 TABLET ORAL DAILY
Qty: 90 TABLET | Refills: 3 | Status: SHIPPED | OUTPATIENT
Start: 2022-01-24 | End: 2023-02-09

## 2022-01-25 NOTE — TELEPHONE ENCOUNTER
"Due to medication information not transferring due to [facility] please review the medication information prior to signing to ensure accuracy.     Disp Refills Start End JIM   omeprazole (PRILOSEC) 20 MG capsule 180 capsule 3 1/26/2021  No   Sig - Route: Take 1 capsule (20 mg total) by mouth 2 (two) times a day before meals. - Oral   Sent to pharmacy as: omeprazole 20 mg capsule,delayed release (PriLOSEC)   E-Prescribing Status: Receipt confirmed by pharmacy (1/26/2021  1:41 PM CST)     Last Written Prescription Date:  01/26/2021  Last Fill Quantity: 180,  # refills: 3   Last office visit provider:  01/21/2022 with Dr Caballero.     Requested Prescriptions   Pending Prescriptions Disp Refills     omeprazole (PRILOSEC) 20 MG DR capsule [Pharmacy Med Name: OMEPRAZOLE DR 20 MG CAPSULE] 180 capsule 2     Sig: TAKE 1 CAPSULE (20 MG TOTAL) BY MOUTH 2 (TWO) TIMES A DAY BEFORE MEALS.       PPI Protocol Passed - 1/24/2022 12:05 AM        Passed - Not on Clopidogrel (unless Pantoprazole ordered)        Passed - No diagnosis of osteoporosis on record        Passed - Recent (12 mo) or future (30 days) visit within the authorizing provider's specialty     Patient has had an office visit with the authorizing provider or a provider within the authorizing providers department within the previous 12 mos or has a future within next 30 days. See \"Patient Info\" tab in inbasket, or \"Choose Columns\" in Meds & Orders section of the refill encounter.              Passed - Medication is active on med list        Passed - Patient is age 18 or older             Rody Wheeler 01/25/22 3:33 PM  "

## 2022-01-28 DIAGNOSIS — I10 ESSENTIAL (PRIMARY) HYPERTENSION: ICD-10-CM

## 2022-01-31 RX ORDER — LOSARTAN POTASSIUM 50 MG/1
50 TABLET ORAL DAILY
Qty: 90 TABLET | Refills: 3 | Status: SHIPPED | OUTPATIENT
Start: 2022-01-31 | End: 2023-01-25

## 2022-01-31 NOTE — TELEPHONE ENCOUNTER
"Outpatient Medication Detail     Disp Refills Start End JIM   losartan (COZAAR) 50 MG tablet 90 tablet 1 6/25/2021  No   Sig: TAKE 1 TABLET BY MOUTH EVERY DAY   Sent to pharmacy as: losartan 50 mg tablet (COZAAR)   E-Prescribing Status: Receipt confirmed by pharmacy (6/25/2021 12:32 PM CDT)       Last office visit provider:  1/21/22     Requested Prescriptions   Pending Prescriptions Disp Refills     losartan (COZAAR) 50 MG tablet [Pharmacy Med Name: LOSARTAN POTASSIUM 50 MG TAB] 90 tablet 1     Sig: TAKE 1 TABLET BY MOUTH EVERY DAY       Angiotensin-II Receptors Passed - 1/28/2022 12:31 PM        Passed - Last blood pressure under 140/90 in past 12 months     BP Readings from Last 3 Encounters:   01/21/22 134/76   11/12/20 128/66   08/31/20 138/68                 Passed - Recent (12 mo) or future (30 days) visit within the authorizing provider's specialty     Patient has had an office visit with the authorizing provider or a provider within the authorizing providers department within the previous 12 mos or has a future within next 30 days. See \"Patient Info\" tab in inbasket, or \"Choose Columns\" in Meds & Orders section of the refill encounter.              Passed - Medication is active on med list        Passed - Patient is age 18 or older        Passed - Normal serum creatinine on file in past 12 months     Recent Labs   Lab Test 01/21/22  1107   CR 1.19       Ok to refill medication if creatinine is low          Passed - Normal serum potassium on file in past 12 months     Recent Labs   Lab Test 01/21/22  1107   POTASSIUM 4.3                         Earle Matos RN 01/31/22 9:03 AM  "

## 2022-02-07 DIAGNOSIS — E78.2 MIXED HYPERLIPIDEMIA: Primary | ICD-10-CM

## 2022-02-08 RX ORDER — SIMVASTATIN 80 MG
40 TABLET ORAL DAILY
Qty: 45 TABLET | Refills: 3 | Status: SHIPPED | OUTPATIENT
Start: 2022-02-08 | End: 2023-01-03

## 2022-02-08 NOTE — TELEPHONE ENCOUNTER
Patients wife returned call and stated she would like simvastin Rx to be sent to [facility] pharmacy [address] @ [address] in [place], [phone].    Dipika Figueroa

## 2022-02-08 NOTE — TELEPHONE ENCOUNTER
Reason for Call:  Other prescription    Detailed comments: patient [place] a [place]ast[place] so please call it into [phone]    Phone Number Patient can be reached at: Home number on file [phone] (home)    Best Time: anytime    Can we leave a detailed message on this number? YES    Call taken on 2/7/2022 at 6:12 PM by Devi Baxter

## 2022-02-08 NOTE — TELEPHONE ENCOUNTER
Please find out which pharmacy this is, and please prepare the simvastatin prescription so I can sign

## 2022-04-05 ENCOUNTER — OFFICE VISIT (OUTPATIENT)
Dept: INTERNAL MEDICINE | Facility: CLINIC | Age: 82
End: 2022-04-05
Payer: COMMERCIAL

## 2022-04-05 VITALS
TEMPERATURE: 97.7 F | OXYGEN SATURATION: 97 % | SYSTOLIC BLOOD PRESSURE: 108 MMHG | HEIGHT: 68 IN | DIASTOLIC BLOOD PRESSURE: 64 MMHG | WEIGHT: 206.8 LBS | BODY MASS INDEX: 31.34 KG/M2 | HEART RATE: 75 BPM

## 2022-04-05 DIAGNOSIS — H50.9 STRABISMUS: ICD-10-CM

## 2022-04-05 DIAGNOSIS — Z23 HIGH PRIORITY FOR COVID-19 VACCINATION: ICD-10-CM

## 2022-04-05 DIAGNOSIS — I10 PRIMARY HYPERTENSION: ICD-10-CM

## 2022-04-05 DIAGNOSIS — Z01.818 PREOP GENERAL PHYSICAL EXAM: Primary | ICD-10-CM

## 2022-04-05 DIAGNOSIS — E03.4 HYPOTHYROIDISM DUE TO ACQUIRED ATROPHY OF THYROID: ICD-10-CM

## 2022-04-05 LAB
ANION GAP SERPL CALCULATED.3IONS-SCNC: 9 MMOL/L (ref 5–18)
BUN SERPL-MCNC: 16 MG/DL (ref 8–28)
CALCIUM SERPL-MCNC: 9.6 MG/DL (ref 8.5–10.5)
CHLORIDE BLD-SCNC: 108 MMOL/L (ref 98–107)
CO2 SERPL-SCNC: 24 MMOL/L (ref 22–31)
CREAT SERPL-MCNC: 1.26 MG/DL (ref 0.7–1.3)
ERYTHROCYTE [DISTWIDTH] IN BLOOD BY AUTOMATED COUNT: 14.1 % (ref 10–15)
GFR SERPL CREATININE-BSD FRML MDRD: 58 ML/MIN/1.73M2
GLUCOSE BLD-MCNC: 101 MG/DL (ref 70–125)
HCT VFR BLD AUTO: 45.9 % (ref 40–53)
HGB BLD-MCNC: 15 G/DL (ref 13.3–17.7)
MCH RBC QN AUTO: 30.2 PG (ref 26.5–33)
MCHC RBC AUTO-ENTMCNC: 32.7 G/DL (ref 31.5–36.5)
MCV RBC AUTO: 92 FL (ref 78–100)
PLATELET # BLD AUTO: 143 10E3/UL (ref 150–450)
POTASSIUM BLD-SCNC: 4.1 MMOL/L (ref 3.5–5)
RBC # BLD AUTO: 4.97 10E6/UL (ref 4.4–5.9)
SODIUM SERPL-SCNC: 141 MMOL/L (ref 136–145)
WBC # BLD AUTO: 3.8 10E3/UL (ref 4–11)

## 2022-04-05 PROCEDURE — 99214 OFFICE O/P EST MOD 30 MIN: CPT | Performed by: INTERNAL MEDICINE

## 2022-04-05 PROCEDURE — 80048 BASIC METABOLIC PNL TOTAL CA: CPT | Performed by: INTERNAL MEDICINE

## 2022-04-05 PROCEDURE — 36415 COLL VENOUS BLD VENIPUNCTURE: CPT | Performed by: INTERNAL MEDICINE

## 2022-04-05 PROCEDURE — 91305 COVID-19,PF,PFIZER (12+ YRS): CPT | Performed by: INTERNAL MEDICINE

## 2022-04-05 PROCEDURE — 0054A COVID-19,PF,PFIZER (12+ YRS): CPT | Performed by: INTERNAL MEDICINE

## 2022-04-05 PROCEDURE — 85027 COMPLETE CBC AUTOMATED: CPT | Performed by: INTERNAL MEDICINE

## 2022-04-05 NOTE — PROGRESS NOTES
[facility]  [address] [address]  Phone: [phone]  Fax: [phone]  Primary Provider: Malvin Shaffer  Pre-op Performing Provider: MALVIN SHAFFER      PREOPERATIVE EVALUATION:  Today's date: 4/5/2022    Michael Maldonado is a 81 year old male who presents for a preoperative evaluation.    Surgical Information:  Surgery/Procedure: Strabismus right eye  Surgery Location: [facility]  Surgeon: Dr. Boyd  Surgery Date: 4/15/22  Time of Surgery:   Where patient plans to recover: At home with family  Fax number for surgical facility: [phone]    Type of Anesthesia Anticipated: General    Assessment & Plan     The proposed surgical procedure is considered LOW risk.    Satisfactory preoperative examination in anticipation of right sided eye surgery to correct strabismus scheduled for April 15, 2022 at [facility]    Michael is generally doing well, although there may be some confusion with some of his medications, particularly his levothyroxine.    I clarified for him today that I want him to take 88 mcg levothyroxine, and I discarded his bottle of 75 mcg tablets  Since there may have been some medication errors, lets not check the TSH today, but instead check it in about 3 months (approximately July 2022).    I ASKED HIM TO LIZBET HIS CALENDAR FOR ABOUT 3 MONTHS FROM NOW (APPROXIMATELY JULY), AND SEND ME A MESSAGE SO I CAN ORDER HIS TSH TEST    For preoperative testing today lets get a basic metabolic panel and CBC.  These have come back satisfactory.  CBC shows a mild reduction of platelet count at 143K, similar to similar readings.  Stable mildly reduced white count 3.8, also similar to previous readings.  Normal hemoglobin 15 g.  Basic metabolic panel has minor variances in chloride level, and slightly reduced calculated GFR at 58.  These test results are acceptable to proceed with eye surgery.  Detailed results of the bottom of  this document    Last electrocardiogram was February 6, 2020.  Sinus rhythm with first-degree AV block, otherwise normal.  He is not reporting any cardiac symptoms.  He has no history of heart disease.    Regarding his medications, he will need to STOP THE BABY ASPIRIN WITH FINAL DOSE ON APRIL 8, 2022 WHICH IS A FRIDAY, will which will give him 1 week with no aspirin.    He should continue to take his other medications per usual routine, including the levothyroxine the morning of his surgery with a small sip of water on an empty stomach.  Stay on his simvastatin for cholesterol, amlodipine and losartan for blood pressure, taken at usual times, including on the morning of surgery with a small sip of water.    Given his fourth shot of Pfizer COVID-19 vaccine today April 5, 2022 (his booster was October 2021      RECOMMENDATION:  APPROVAL GIVEN to proceed with proposed procedure, without further diagnostic evaluation.      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 [employer]  September 2021 saw Dr Curry at [facility]  Did PT 5-6 week     Obesity with body mass index of 31.2, weight has been creeping up over the course of 2020, and I encouraged him to try to pull off 25 pounds over the next 6 to 9 months which will really help with his blood pressure, take the stress off his knees, and give him more energy    He told me April 5, 2022 that he has  reduce the alcohol consumption, instead of martinis, he will have a small glass of  rafael     01/21/22 93 kg (205 lb)   11/12/20 96.1 kg (211 lb 14.4 oz)   08/31/20 95.3 kg (210 lb)   02/06/20 93.4 kg (206 lb)   01/22/20 91.6 kg (202 lb)     Memory impairment  History of craniotomy for an arteriovenous malformation in 1970, with good long-term recovery.    Could not recall 3 objects today January 21, 2022     Essential hypertension, reasonably well controlled on combination of losartan plus amlodipine.  We will keep his blood pressure medicines the same.     BP Readings from Last 6 Encounters:   04/05/22 108/64   01/21/22 134/76   11/12/20 128/66   08/31/20 138/68   02/06/20 (!) 140/78   01/22/20 138/84     Hyperlipidemia on high-dose simvastatin, lipids reasonably well controlled when last checked January 2020, no history of cardiovascular events  Cholesterol panel from January 21, 2022 showed good control with total cholesterol 159, triglycerides 96, HDL 55, LDL 85.    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       Actinic keratoses, with lesions on his scalp that need to be examined by dermatology, sees them every 3 months.       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       History of deep venous thrombosis associated with the 2019 knee arthroplasty, no longer needs to be on anticoagulation.      Vaccinations including seasonal flu vaccine, pneumococcus, and shingles.  Booster:   COVID-19,PF,Pfizer (12+ Yrs) 10/04/2021     Preop Questions 4/5/2022   1. Have you ever had a heart attack or stroke? No   2. Have you ever had surgery on your heart or blood vessels, such as a stent placement, a coronary artery bypass, or surgery on an artery in your  head, neck, heart, or legs? No   3. Do you have chest pain with activity? No   4. Do you have a history of  heart failure? No   5. Do you currently have a cold, bronchitis or symptoms of other infection? No   6. Do you have a cough, shortness of breath, or wheezing? No   7. Do you or anyone in your family have previous history of blood clots? No   8. Do you or does anyone in your family have a serious bleeding problem such as prolonged bleeding following surgeries or cuts? No   9. Have you ever had problems with anemia or been told to take iron pills? No   10. Have you had any abnormal blood loss such as black, tarry or bloody stools? No   11. Have you ever had a blood transfusion? YES -    11a. Have you ever had a transfusion reaction? No   12. Are you willing to have a blood transfusion if it is medically needed before, during, or after your surgery? Yes   13. Have you or any of your relatives ever had problems with anesthesia? No   14. Do you have sleep apnea, excessive snoring or daytime drowsiness? No   15. Do you have any artifical heart valves or other implanted medical devices like a pacemaker, defibrillator, or continuous glucose monitor? No   16. Do you have artificial joints? No   17. Are you allergic to latex? No       Review of Systems  CONSTITUTIONAL: NEGATIVE for fever, chills, change in weight  INTEGUMENTARY/SKIN: NEGATIVE for worrisome rashes, moles or lesions  EYES: NEGATIVE for vision changes or irritation  ENT/MOUTH: NEGATIVE for ear, mouth and throat problems  RESP: NEGATIVE for significant cough or SOB  CV: NEGATIVE for chest pain, palpitations or peripheral edema  GI: NEGATIVE for nausea, abdominal pain, heartburn, or change in bowel habits  : NEGATIVE for frequency, dysuria, or hematuria  MUSCULOSKELETAL: NEGATIVE for significant arthralgias or myalgia  NEURO: NEGATIVE for weakness, dizziness or paresthesias  ENDOCRINE: NEGATIVE for temperature intolerance, skin/hair changes  HEME: NEGATIVE  for bleeding problems  PSYCHIATRIC: NEGATIVE for changes in mood or affect    Patient Active Problem List    Diagnosis Date Noted     History of head and neck cancer 11/12/2020     Priority: Medium     Status post total knee replacement, right 01/07/2019     Priority: Medium     History of deep vein thrombophlebitis of lower extremity 11/09/2018     Priority: Medium     Hypothyroidism due to acquired atrophy of thyroid 04/11/2018     Priority: Medium     Hypertension 04/25/2017     Priority: Medium     Hyperlipidemia 04/12/2016     Priority: Medium     Leukopenia 11/17/2010     Priority: Medium      Past Medical History:   Diagnosis Date     BPH (benign prostatic hyperplasia)      Head and neck cancer (H) 4/12/2016     Hyperlipidemia      Hypertension      Leukopenia     mild     Osteoarthritis      Stroke (H)      Past Surgical History:   Procedure Laterality Date     CRANIOTOMY FOR AVM  1970    clipped     JOINT REPLACEMENT       SALIVARY GLAND SURGERY Right 2011    squamous cell cancer grade 3     TOTAL KNEE ARTHROPLASTY Left 2013     ZZC TOTAL KNEE ARTHROPLASTY Right 1/7/2019    Procedure: RIGHT TOTAL KNEE  ARTHROPLASTY;  Surgeon: Darrel Page DO;  Location: [facility] OR;  Service: Orthopedics     Current Outpatient Medications   Medication Sig Dispense Refill     amLODIPine (NORVASC) 5 MG tablet Take 1 tablet by mouth daily       aspirin (ASA) 81 MG EC tablet Take 1 tablet by mouth daily       levothyroxine (SYNTHROID/LEVOTHROID) 88 MCG tablet Take 1 tablet (88 mcg) by mouth daily 90 tablet 3     losartan (COZAAR) 50 MG tablet Take 1 tablet (50 mg) by mouth daily 90 tablet 3     omeprazole (PRILOSEC) 20 MG DR capsule TAKE 1 CAPSULE (20 MG TOTAL) BY MOUTH 2 (TWO) TIMES A DAY BEFORE MEALS. 180 capsule 2     simvastatin (ZOCOR) 80 MG tablet Take 0.5 tablets (40 mg) by mouth daily 45 tablet 3       No Known Allergies     Social History     Tobacco Use     Smoking status: Former Smoker     Types: Cigars,  "Cigars     Quit date: 2000     Years since quittin.2     Smokeless tobacco: Never Used   Substance Use Topics     Alcohol use: Yes     Alcohol/week: 1.0 standard drink     Comment: Alcoholic Drinks/day: nightly       History   Drug Use No         Objective     /64 (BP Location: Right arm, Patient Position: Sitting, Cuff Size: Adult Large)   Pulse 75   Temp 97.7  F (36.5  C) (Oral)   Ht 1.727 m (5' 8\")   Wt 93.8 kg (206 lb 12.8 oz)   SpO2 97%   BMI 31.44 kg/m      Physical Exam    He looks good today.  Memory deficits probably about the same, we have to work through the confusion of his medicines but I think he gets it now    Lungs clear  Heart regular rate and rhythm  Abdomen nontender  Extremities no edema    Recent Labs   Lab Test 22  1107 20  1512   HGB 15.1 15.6   * 133*    140   POTASSIUM 4.3 4.0   CR 1.19 1.28        Diagnostics:  Recent Results (from the past 24 hour(s))   CBC with platelets    Collection Time: 22  9:43 AM   Result Value Ref Range    WBC Count 3.8 (L) 4.0 - 11.0 10e3/uL    RBC Count 4.97 4.40 - 5.90 10e6/uL    Hemoglobin 15.0 13.3 - 17.7 g/dL    Hematocrit 45.9 40.0 - 53.0 %    MCV 92 78 - 100 fL    MCH 30.2 26.5 - 33.0 pg    MCHC 32.7 31.5 - 36.5 g/dL    RDW 14.1 10.0 - 15.0 %    Platelet Count 143 (L) 150 - 450 10e3/uL   Basic metabolic panel  (Ca, Cl, CO2, Creat, Gluc, K, Na, BUN)    Collection Time: 22  9:43 AM   Result Value Ref Range    Sodium 141 136 - 145 mmol/L    Potassium 4.1 3.5 - 5.0 mmol/L    Chloride 108 (H) 98 - 107 mmol/L    Carbon Dioxide (CO2) 24 22 - 31 mmol/L    Anion Gap 9 5 - 18 mmol/L    Urea Nitrogen 16 8 - 28 mg/dL    Creatinine 1.26 0.70 - 1.30 mg/dL    Calcium 9.6 8.5 - 10.5 mg/dL    Glucose 101 70 - 125 mg/dL    GFR Estimate 58 (L) >60 mL/min/1.73m2      No EKG required, no history of coronary heart disease, significant arrhythmia, peripheral arterial disease or other structural heart disease.     ** Poor " data quality, interpretation may be adversely affected   Sinus rhythm with 1st degree A-V block   Otherwise normal ECG   When compared with ECG of 22-JAN-2019 14:57,   No significant change was found   Confirmed by MILO DE LOS SANTOS MD LOC:[facility] ([id number]) on 2/6/2020       Revised Cardiac Risk Index (RCRI):  The patient has the following serious cardiovascular risks for perioperative complications:   - No serious cardiac risks = 0 points     RCRI Interpretation: 0 points: Class I (very low risk - 0.4% complication rate)    Signed Electronically by: LUL SHAFFER MD  Copy of this evaluation report is provided to requesting physician.

## 2022-04-05 NOTE — PATIENT INSTRUCTIONS
Satisfactory preoperative examination in anticipation of right sided eye surgery to correct strabismus scheduled for April 15, 2022 at [facility]    Michael is generally doing well, although there may be some confusion with some of his medications, particularly his levothyroxine.    I clarified for him today that I want him to take 88 mcg levothyroxine, and I discarded his bottle of 75 mcg tablets  Since there may have been some medication errors, lets not check the TSH today, but instead check it in about 3 months (approximately July 2022).    I ASKED HIM TO LIZBET HIS CALENDAR FOR ABOUT 3 MONTHS FROM NOW (APPROXIMATELY JULY), AND SEND ME A MESSAGE SO I CAN ORDER HIS TSH TEST    For preoperative testing today lets get a basic metabolic panel and CBC    Regarding his medications, he will need to STOP THE BABY ASPIRIN WITH FINAL DOSE ON APRIL 8, 2022 WHICH IS A FRIDAY, will which will give him 1 week with no aspirin.    He should continue to take his other medications per usual routine, including the levothyroxine the morning of his surgery with a small sip of water on an empty stomach.  Stay on his simvastatin for cholesterol, amlodipine and losartan for blood pressure, taken at usual times, including on the morning of surgery with a small sip of water.    Given his fourth shot of Pfizer COVID-19 vaccine today April 5, 2022 (his booster was October 2021

## 2022-04-11 ENCOUNTER — TRANSFERRED RECORDS (OUTPATIENT)
Dept: HEALTH INFORMATION MANAGEMENT | Facility: CLINIC | Age: 82
End: 2022-04-11
Payer: COMMERCIAL

## 2022-06-01 ENCOUNTER — OFFICE VISIT (OUTPATIENT)
Dept: FAMILY MEDICINE | Facility: CLINIC | Age: 82
End: 2022-06-01
Payer: COMMERCIAL

## 2022-06-01 VITALS
OXYGEN SATURATION: 97 % | BODY MASS INDEX: 30.71 KG/M2 | SYSTOLIC BLOOD PRESSURE: 135 MMHG | DIASTOLIC BLOOD PRESSURE: 84 MMHG | RESPIRATION RATE: 16 BRPM | TEMPERATURE: 97.8 F | WEIGHT: 202 LBS | HEART RATE: 88 BPM
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
## 2022-06-01 DIAGNOSIS — S30.861A TICK BITE OF ABDOMINAL WALL, INITIAL ENCOUNTER: Primary | ICD-10-CM

## 2022-06-01 PROCEDURE — 99213 OFFICE O/P EST LOW 20 MIN: CPT | Performed by: PHYSICIAN ASSISTANT

## 2022-06-01 NOTE — PROGRESS NOTES
Assessment & Plan:      Problem List Items Addressed This Visit    None     Visit Diagnoses     Tick bite of abdominal wall, initial encounter    -  Primary        Medical Decision Making  Patient presents with a tick bite that occurred 10 days ago.  Examination today appears very reassuring.  No signs concerning for Lyme's disease and tick was identified as a wood tick.  Tick bite site shows no signs of secondary cellulitis and appears to be healing appropriately.  Provided patient with reassurance.  Discussed signs of worsening symptoms and when to follow-up with PCP if no symptom improvement.     Subjective:      History provided by the patient.  He is also here with his wife.  Michael Maldonado is a 81 year old male here for evaluation of a tick bite on the right abdomen.  Tick bite occurred about 10 days ago.  Patient remove the tick within about an hour or 2 of it being attached.  Patient identified the tick as a wood tick.  Comes in today because there is some redness surrounding the bite site.  Patient otherwise denies fevers and body aches.     The following portions of the patient's history were reviewed and updated as appropriate: allergies, current medications, and problem list.     Review of Systems  Pertinent items are noted in HPI.    Allergies  No Known Allergies    Family History   Problem Relation Age of Onset     Cirrhosis Father      Diabetes Father        Social History     Tobacco Use     Smoking status: Former Smoker     Types: Cigars, Cigars     Quit date: 2000     Years since quittin.4     Smokeless tobacco: Never Used   Substance Use Topics     Alcohol use: Yes     Alcohol/week: 1.0 standard drink     Comment: Alcoholic Drinks/day: nightly        Objective:      /84   Pulse 88   Temp 97.8  F (36.6  C)   Resp 16   Wt 91.6 kg (202 lb)   SpO2 97%   BMI 30.71 kg/m    General appearance - alert, well appearing, and in no distress and non-toxic  Skin - Abdomen: Single bite  site along the right lower abdomen that appears to be healing appropriately with no signs of erythema migrans and no signs of cellulitis    The use of Dragon/[employer] dictation services was used to construct the content of this note; any grammatical errors are non-intentional. Please contact the author directly if you are in need of any clarification.

## 2022-07-21 ENCOUNTER — OFFICE VISIT (OUTPATIENT)
Dept: INTERNAL MEDICINE | Facility: CLINIC | Age: 82
End: 2022-07-21
Payer: COMMERCIAL

## 2022-07-21 VITALS
TEMPERATURE: 97.5 F | HEART RATE: 76 BPM | OXYGEN SATURATION: 97 % | SYSTOLIC BLOOD PRESSURE: 124 MMHG | BODY MASS INDEX: 30.64 KG/M2 | DIASTOLIC BLOOD PRESSURE: 68 MMHG | WEIGHT: 201.5 LBS

## 2022-07-21 DIAGNOSIS — E03.4 HYPOTHYROIDISM DUE TO ACQUIRED ATROPHY OF THYROID: Primary | ICD-10-CM

## 2022-07-21 LAB — TSH SERPL DL<=0.005 MIU/L-ACNC: 3.31 UIU/ML (ref 0.3–4.2)

## 2022-07-21 PROCEDURE — 99213 OFFICE O/P EST LOW 20 MIN: CPT | Performed by: INTERNAL MEDICINE

## 2022-07-21 PROCEDURE — 36415 COLL VENOUS BLD VENIPUNCTURE: CPT | Performed by: INTERNAL MEDICINE

## 2022-07-21 PROCEDURE — 84443 ASSAY THYROID STIM HORMONE: CPT | Performed by: INTERNAL MEDICINE

## 2022-07-21 NOTE — PATIENT INSTRUCTIONS
Follow-up, particularly to recheck his thyroid status    Michael is feeling well, and did make the dose increase in January 2 current levothyroxine 88 mcg/day, will check TSH today July 21.  I will communicate the results to him via [employer].    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    Could not recall 3 objects today January 21, 2022     Essential hypertension, reasonably well controlled on combination of losartan plus amlodipine.  We will keep his blood pressure medicines the same.      BP Readings from Last 6 Encounters:   07/21/22 124/68   06/01/22 135/84   04/05/22 108/64   01/21/22 134/76   11/12/20 128/66   08/31/20 138/68     Hyperlipidemia on high-dose simvastatin, lipids reasonably well controlled when last checked January 2020, no history of cardiovascular events  Cholesterol panel from January 21, 2022 showed good control with total cholesterol 159, triglycerides 96, HDL 55, LDL 85.     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       Actinic keratoses, with lesions on his scalp that need to be examined by dermatology, sees them every 3 months.       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       History of deep venous thrombosis associated with the 2019 knee arthroplasty, no longer needs to be on anticoagulation.

## 2022-07-21 NOTE — PROGRESS NOTES
Office Visit - Follow Up   Michael Maldonado   82 year old male    Date of Visit: 7/21/2022    Chief Complaint   Patient presents with     Thyroid Problem     Follow up        -------------------------------------------------------------------------------------------------------------------------  Assessment and Plan    Follow-up, particularly to recheck his thyroid status    Michael is feeling well, and did make the dose increase in January 2 current levothyroxine 88 mcg/day, will check TSH today July 21.  I will communicate the results to him via [employer].    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    Could not recall 3 objects today January 21, 2022     Essential hypertension, reasonably well controlled on combination of losartan plus amlodipine.  We will keep his blood pressure medicines the same.      BP Readings from Last 6 Encounters:   07/21/22 124/68   06/01/22 135/84   04/05/22 108/64   01/21/22 134/76   11/12/20 128/66   08/31/20 138/68      Hyperlipidemia on high-dose simvastatin, lipids reasonably well controlled when last checked January 2020, no history of cardiovascular events  Cholesterol panel from January 21, 2022 showed good control with total cholesterol 159, triglycerides 96, HDL 55, LDL 85.     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       Actinic keratoses, with lesions on his scalp that need to be examined by dermatology, sees them every 3 months.       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       History of deep venous thrombosis associated with the 2019 knee arthroplasty, no longer needs to be on anticoagulation.        --------------------------------------------------------------------------------------------------------------------------  History of Present Illness  This 82 year old old     Follow-up, particularly to recheck his thyroid status    Michael is feeling well, and did make the dose increase in January 2 current levothyroxine 88 mcg/day, will check TSH today July 21.  I will communicate the results to him via [employer].    Hypothyroidism on replacement, on levothyroxine 88 mcg a day dose increased in response to elevated TSH 1- 1-    TSH 0.30 - 5.00 uIU/mL 9.20          Wt Readings from Last 3 Encounters:   07/21/22 91.4 kg (201 lb 8 oz)   06/01/22 91.6 kg (202 lb)   04/05/22 93.8 kg (206 lb 12.8 oz)     BP Readings from Last 3 Encounters:   07/21/22 124/68   06/01/22 135/84   04/05/22 108/64     ---------------------------------------------------------------------------------------------------------------------------    Medications, Allergies, Social,  and Problem List   Current Outpatient Medications   Medication Sig Dispense Refill     amLODIPine (NORVASC) 5 MG tablet TAKE 1 TABLET BY MOUTH EVERY DAY 90 tablet 3     aspirin (ASA) 81 MG EC tablet Take 1 tablet by mouth every other day       levothyroxine (SYNTHROID/LEVOTHROID) 88 MCG tablet Take 1 tablet (88 mcg) by mouth daily 90 tablet 3     losartan (COZAAR) 50 MG tablet Take 1 tablet (50 mg) by mouth daily 90 tablet 3     omeprazole (PRILOSEC) 20 MG DR capsule TAKE 1 CAPSULE (20 MG TOTAL) BY MOUTH 2 (TWO) TIMES A DAY BEFORE MEALS. 180 capsule 2     simvastatin (ZOCOR) 80 MG tablet Take 0.5 tablets (40 mg) by mouth daily 45 tablet 3     No Known Allergies  Social History     Tobacco Use     Smoking status: Former Smoker     Types: Cigars, Cigars     Quit date: 2000     Years since quittin.5     Smokeless tobacco: Never Used   Substance Use Topics     Alcohol use: Yes     Alcohol/week: 1.0 standard drink     Comment: Alcoholic Drinks/day: nightly     Drug use: No     Patient Active Problem List   Diagnosis     Hyperlipidemia     Hypertension     Leukopenia     Hypothyroidism due to acquired atrophy of thyroid     History of deep vein thrombophlebitis of lower extremity     Status post total knee replacement, right     History of head and neck cancer        Reviewed, reconciled and updated       Physical Exam   General Appearance:       /68 (BP Location: Right arm, Patient Position: Sitting, Cuff Size: Adult Large)   Pulse 76   Temp 97.5  F (36.4  C) (Oral)   Wt 91.4 kg (201 lb 8 oz)   SpO2 97%   BMI 30.64 kg/m      Michael appears well today.     Additional Information   I spent 20 minutes on this encounter, including reviewing interval history since last visit, examining the patient, explaining and counseling the issues enumerated in the Assessment and Plan (patient given a copy), ordering indicated tests     LUL SHAFFER MD, MD

## 2022-07-26 ENCOUNTER — TRANSFERRED RECORDS (OUTPATIENT)
Dept: HEALTH INFORMATION MANAGEMENT | Facility: CLINIC | Age: 82
End: 2022-07-26

## 2022-10-03 ENCOUNTER — IMMUNIZATION (OUTPATIENT)
Dept: NURSING | Facility: CLINIC | Age: 82
End: 2022-10-03
Payer: COMMERCIAL

## 2022-10-03 PROCEDURE — 91312 COVID-19,PF,PFIZER BOOSTER BIVALENT: CPT

## 2022-10-03 PROCEDURE — 0124A COVID-19,PF,PFIZER BOOSTER BIVALENT: CPT

## 2022-10-16 ENCOUNTER — TRANSFERRED RECORDS (OUTPATIENT)
Dept: HEALTH INFORMATION MANAGEMENT | Facility: CLINIC | Age: 82
End: 2022-10-16

## 2022-10-18 ENCOUNTER — TRANSFERRED RECORDS (OUTPATIENT)
Dept: HEALTH INFORMATION MANAGEMENT | Facility: CLINIC | Age: 82
End: 2022-10-18

## 2022-10-29 ENCOUNTER — HEALTH MAINTENANCE LETTER (OUTPATIENT)
Age: 82
End: 2022-10-29

## 2022-11-09 ENCOUNTER — TRANSFERRED RECORDS (OUTPATIENT)
Dept: HEALTH INFORMATION MANAGEMENT | Facility: CLINIC | Age: 82
End: 2022-11-09

## 2022-11-10 ENCOUNTER — APPOINTMENT (OUTPATIENT)
Dept: CT IMAGING | Facility: CLINIC | Age: 82
End: 2022-11-10
Attending: EMERGENCY MEDICINE
Payer: COMMERCIAL

## 2022-11-10 ENCOUNTER — HOSPITAL ENCOUNTER (EMERGENCY)
Facility: CLINIC | Age: 82
Discharge: HOME | End: 2022-11-10
Attending: EMERGENCY MEDICINE | Admitting: EMERGENCY MEDICINE
Payer: COMMERCIAL

## 2022-11-10 VITALS
HEART RATE: 70 BPM | DIASTOLIC BLOOD PRESSURE: 70 MMHG | BODY MASS INDEX: 29.55 KG/M2 | OXYGEN SATURATION: 95 % | WEIGHT: 195 LBS | SYSTOLIC BLOOD PRESSURE: 142 MMHG | HEIGHT: 68 IN | TEMPERATURE: 97.7 F | RESPIRATION RATE: 25 BRPM

## 2022-11-10 DIAGNOSIS — R41.0 CONFUSION: ICD-10-CM

## 2022-11-10 LAB
ANION GAP SERPL CALCULATED.3IONS-SCNC: 9 MMOL/L (ref 5–18)
APTT PPP: 30 SECONDS (ref 22–38)
ATRIAL RATE - MUSE: 80 BPM
BASOPHILS # BLD AUTO: 0 10E3/UL (ref 0–0.2)
BASOPHILS NFR BLD AUTO: 0 %
BUN SERPL-MCNC: 18 MG/DL (ref 8–28)
CALCIUM SERPL-MCNC: 9.2 MG/DL (ref 8.5–10.5)
CHLORIDE BLD-SCNC: 105 MMOL/L (ref 98–107)
CO2 SERPL-SCNC: 23 MMOL/L (ref 22–31)
CREAT SERPL-MCNC: 1.15 MG/DL (ref 0.7–1.3)
DIASTOLIC BLOOD PRESSURE - MUSE: NORMAL MMHG
EOSINOPHIL # BLD AUTO: 0 10E3/UL (ref 0–0.7)
EOSINOPHIL NFR BLD AUTO: 0 %
ERYTHROCYTE [DISTWIDTH] IN BLOOD BY AUTOMATED COUNT: 13.9 % (ref 10–15)
GFR SERPL CREATININE-BSD FRML MDRD: 64 ML/MIN/1.73M2
GLUCOSE BLD-MCNC: 124 MG/DL (ref 70–125)
GLUCOSE BLDC GLUCOMTR-MCNC: 126 MG/DL (ref 70–99)
HCT VFR BLD AUTO: 44.6 % (ref 40–53)
HGB BLD-MCNC: 15 G/DL (ref 13.3–17.7)
IMM GRANULOCYTES # BLD: 0 10E3/UL
IMM GRANULOCYTES NFR BLD: 0 %
INR PPP: 1.05 (ref 0.85–1.15)
INTERPRETATION ECG - MUSE: NORMAL
LYMPHOCYTES # BLD AUTO: 0.4 10E3/UL (ref 0.8–5.3)
LYMPHOCYTES NFR BLD AUTO: 8 %
MCH RBC QN AUTO: 30.5 PG (ref 26.5–33)
MCHC RBC AUTO-ENTMCNC: 33.6 G/DL (ref 31.5–36.5)
MCV RBC AUTO: 91 FL (ref 78–100)
MONOCYTES # BLD AUTO: 0.1 10E3/UL (ref 0–1.3)
MONOCYTES NFR BLD AUTO: 2 %
NEUTROPHILS # BLD AUTO: 4.7 10E3/UL (ref 1.6–8.3)
NEUTROPHILS NFR BLD AUTO: 90 %
NRBC # BLD AUTO: 0 10E3/UL
NRBC BLD AUTO-RTO: 0 /100
P AXIS - MUSE: 44 DEGREES
PLATELET # BLD AUTO: 165 10E3/UL (ref 150–450)
POTASSIUM BLD-SCNC: 4.4 MMOL/L (ref 3.5–5)
PR INTERVAL - MUSE: 242 MS
QRS DURATION - MUSE: 82 MS
QT - MUSE: 376 MS
QTC - MUSE: 433 MS
R AXIS - MUSE: -28 DEGREES
RBC # BLD AUTO: 4.92 10E6/UL (ref 4.4–5.9)
SODIUM SERPL-SCNC: 137 MMOL/L (ref 136–145)
SYSTOLIC BLOOD PRESSURE - MUSE: NORMAL MMHG
T AXIS - MUSE: 27 DEGREES
TROPONIN I SERPL-MCNC: 0.03 NG/ML (ref 0–0.29)
VENTRICULAR RATE- MUSE: 80 BPM
WBC # BLD AUTO: 5.3 10E3/UL (ref 4–11)

## 2022-11-10 PROCEDURE — 36415 COLL VENOUS BLD VENIPUNCTURE: CPT | Performed by: EMERGENCY MEDICINE

## 2022-11-10 PROCEDURE — 250N000011 HC RX IP 250 OP 636: Performed by: EMERGENCY MEDICINE

## 2022-11-10 PROCEDURE — 84484 ASSAY OF TROPONIN QUANT: CPT | Performed by: EMERGENCY MEDICINE

## 2022-11-10 PROCEDURE — 93005 ELECTROCARDIOGRAM TRACING: CPT | Performed by: EMERGENCY MEDICINE

## 2022-11-10 PROCEDURE — 85730 THROMBOPLASTIN TIME PARTIAL: CPT | Performed by: EMERGENCY MEDICINE

## 2022-11-10 PROCEDURE — 70498 CT ANGIOGRAPHY NECK: CPT

## 2022-11-10 PROCEDURE — 99285 EMERGENCY DEPT VISIT HI MDM: CPT | Mod: 25

## 2022-11-10 PROCEDURE — 80048 BASIC METABOLIC PNL TOTAL CA: CPT | Performed by: EMERGENCY MEDICINE

## 2022-11-10 PROCEDURE — 85610 PROTHROMBIN TIME: CPT | Performed by: EMERGENCY MEDICINE

## 2022-11-10 PROCEDURE — 70496 CT ANGIOGRAPHY HEAD: CPT

## 2022-11-10 PROCEDURE — 85025 COMPLETE CBC W/AUTO DIFF WBC: CPT | Performed by: EMERGENCY MEDICINE

## 2022-11-10 RX ORDER — IOPAMIDOL 755 MG/ML
100 INJECTION, SOLUTION INTRAVASCULAR ONCE
Status: COMPLETED | OUTPATIENT
Start: 2022-11-10 | End: 2022-11-10

## 2022-11-10 RX ADMIN — IOPAMIDOL 75 ML: 755 INJECTION, SOLUTION INTRAVENOUS at 17:42

## 2022-11-10 ASSESSMENT — ACTIVITIES OF DAILY LIVING (ADL): ADLS_ACUITY_SCORE: 35

## 2022-11-10 NOTE — ED TRIAGE NOTES
The patient presents to the ED with a sudden onset of confusion around 1500 today. The patient's wife reports that he was asking repetitive questions. The patient also is noted to have a droop in his right eye, according to his wife it looks different than usual. Denies any symptoms. Denies weakness, headache, dizziness. Dr Wadsworth called to assess patient in [facility] for stroke code.

## 2022-11-10 NOTE — ED PROVIDER NOTES
EMERGENCY DEPARTMENT ENCOUnter      NAME: Michael Maldonado  AGE: 82 year old male  YOB: 1940  MRN: [id number]  EVALUATION DATE & TIME: No admission date for patient encounter.    PCP: Malvin Caballero    ED PROVIDER: Linwood Wadsworth DO      Chief Complaint   Patient presents with     Confusion         FINAL IMPRESSION:  1. Confusion          ED COURSE & MEDICAL DECISION MAKIN:26 PM I met the patient in triage and performed my initial interview and exam.   5:44 PM I spoke with Dr. Capo Long, neuro radiology.   5:48 PM I discussed the case with Dr. Muniz [facility] neurology.   5:53 PM I spoke with Dr. Long, radiology.   6:24 PM I spoke with Dr. Muniz, who recommends admission.   6:49 PM I rechecked and updated the patient on results and stroke neuro recommendations.  He does not want to be admitted.       The patient presented to the emergency department today after a sudden onset of confusion and repetitive question asking.  His wife also noted that his left eye might be slightly droopy.  There were no focal deficits appreciated on exam.  Initial CT and CTA were unremarkable aside from some chronic changes.  His case was discussed with the stroke neurologist.  They recommended that the patient stay in the hospital for further evaluation.  They also recommended obtaining an MRI if possible.  The patient is unable to have an MRI due to surgical clips in his head.  He is also unwilling to stay in the hospital for further evaluation.  His symptoms have significantly improved over the course of his ER stay.  Plan will be for discharge home with close outpatient neurology follow-up.      Medical Decision Making    Supplemental history from: Family Member    External Record(s) Reviewed: N/A    Differential Diagnosis: See MDM charting for differential considered.     I performed an independent interpretation of the: EKG: See my documentation.    Discussed with radiology regarding test interpretation: CT  "Results    Discussion of management with another provider: See ED Course    The following testing was considered but ultimately not selected: None    I considered prescription management with: N/A    The patient's care impacted: None    Consideration of Admission/Observation: I recommended admission but the patient was not amenable to this.    Care significantly affected by Social Determinants of Health including: N/A       At the conclusion of the encounter I discussed the results of all of the tests and the disposition. The questions were answered. The patient or family acknowledged understanding and was agreeable with the care plan.       =================================================================    HPI        Michael Maldonado is a 82 year old male with a pertinent history of HTN, HLD, hypothyroidism, stroke, cerebral arteriovenous malformation, parotid gland cancer, who presents to this ED by private car with wife for evaluation of confusion.     Per patient's wife, today at 3:00pm she noticed onset of confusion in the patient. He was asking repetitive questions that she had already answered. Wife explains that she had given him money for his wallet this morning, and at 3pm, he asked 5x where the money had come from. On the way to the ED with patient in the car, she was talking on the phone with their son telling him what was going on. Soon after, patient asked if she had told anyone about going to the ED. Additionally, wife notes his right eye appears droopy. Denies history of stroke.     Per patient, he feels \"wonderful.\" He states he has good memory, but that it is \"short.\" He thinks wife is overreacting.       REVIEW OF SYSTEMS     Constitutional:  Denies fever or chills  HENT:  Denies sore throat   Respiratory:  Denies cough or shortness of breath   Cardiovascular:  Denies chest pain or palpitations  GI:  Denies abdominal pain, nausea, or vomiting  Musculoskeletal:  Denies any new extremity pain "   Skin:  Denies rash   Neurologic:  Positive for right eye droop, confusion. Denies headache, focal weakness or sensory changes    All other systems reviewed and are negative      PAST MEDICAL HISTORY:  Past Medical History:   Diagnosis Date     BPH (benign prostatic hyperplasia)      Head and neck cancer (H) 2016     Hyperlipidemia      Hypertension      Leukopenia     mild     Osteoarthritis      Stroke (H)        PAST SURGICAL HISTORY:  Past Surgical History:   Procedure Laterality Date     CRANIOTOMY FOR AVM  1970    clipped     JOINT REPLACEMENT       SALIVARY GLAND SURGERY Right     squamous cell cancer grade 3     TOTAL KNEE ARTHROPLASTY Left      ZZC TOTAL KNEE ARTHROPLASTY Right 2019    Procedure: RIGHT TOTAL KNEE  ARTHROPLASTY;  Surgeon: Darrel Page DO;  Location: [facility] Main OR;  Service: Orthopedics           CURRENT MEDICATIONS:    amLODIPine (NORVASC) 5 MG tablet  aspirin (ASA) 81 MG EC tablet  levothyroxine (SYNTHROID/LEVOTHROID) 88 MCG tablet  losartan (COZAAR) 50 MG tablet  omeprazole (PRILOSEC) 20 MG DR capsule  simvastatin (ZOCOR) 80 MG tablet        ALLERGIES:  No Known Allergies    FAMILY HISTORY:  Family History   Problem Relation Age of Onset     Cirrhosis Father      Diabetes Father        SOCIAL HISTORY:   Social History     Socioeconomic History     Marital status:      Spouse name: None     Number of children: None     Years of education: None     Highest education level: None   Tobacco Use     Smoking status: Former     Types: Cigars     Quit date: 2000     Years since quittin.8     Smokeless tobacco: Never   Substance and Sexual Activity     Alcohol use: Yes     Alcohol/week: 1.0 standard drink     Comment: Alcoholic Drinks/day: nightly     Drug use: No   Social History Narrative    , retired jang       VITALS:  Patient Vitals for the past 24 hrs:   BP Temp Temp src Pulse Resp SpO2 Height Weight   11/10/22 1915 (!) 148/73 -- -- 67 -- 92  "% -- --   11/10/22 1815 (!) 159/86 -- -- 89 17 95 % -- --   11/10/22 1800 (!) 161/86 -- -- 91 18 95 % -- --   11/10/22 1745 (!) 155/89 -- -- 94 18 96 % -- --   11/10/22 1728 (!) 158/83 97.7  F (36.5  C) Temporal 91 18 96 % 1.727 m (5' 8\") 88.5 kg (195 lb)       PHYSICAL EXAM    Constitutional:  Well developed, Well nourished,  HENT:  Normocephalic, Atraumatic, Bilateral external ears normal, Oropharynx moist, Nose normal.   Neck:  Normal range of motion, No meningismus, No stridor.   Eyes:  EOMI, Conjunctiva normal, No discharge.   Respiratory:  Normal breath sounds, No respiratory distress, No wheezing  Cardiovascular:  Normal heart rate, Normal rhythm, No murmurs  GI:  Soft, No tenderness, No guarding  Musculoskeletal: No tenderness to palpation or major deformities noted.   Integument:  Warm, Dry, No erythema, No rash.   Neurologic:  Alert & oriented x 3, Normal motor function, Normal sensory function, No focal deficits noted. Equal strength and sensation in all extremities. Answering questions appropriately.   Psychiatric:  Affect normal, Judgment normal, Mood normal.      LAB:  All pertinent labs reviewed and interpreted.  Results for orders placed or performed during the hospital encounter of 11/10/22              Basic metabolic panel   Result Value Ref Range    Sodium 137 136 - 145 mmol/L    Potassium 4.4 3.5 - 5.0 mmol/L    Chloride 105 98 - 107 mmol/L    Carbon Dioxide (CO2) 23 22 - 31 mmol/L    Anion Gap 9 5 - 18 mmol/L    Urea Nitrogen 18 8 - 28 mg/dL    Creatinine 1.15 0.70 - 1.30 mg/dL    Calcium 9.2 8.5 - 10.5 mg/dL    Glucose 124 70 - 125 mg/dL    GFR Estimate 64 >60 mL/min/1.73m2   Result Value Ref Range    INR 1.05 0.85 - 1.15   Partial thromboplastin time   Result Value Ref Range    aPTT 30 22 - 38 Seconds   Result Value Ref Range    Troponin I 0.03 0.00 - 0.29 ng/mL   CBC with platelets and differential   Result Value Ref Range    WBC Count 5.3 4.0 - 11.0 10e3/uL    RBC Count 4.92 4.40 - 5.90 " 10e6/uL    Hemoglobin 15.0 13.3 - 17.7 g/dL    Hematocrit 44.6 40.0 - 53.0 %    MCV 91 78 - 100 fL    MCH 30.5 26.5 - 33.0 pg    MCHC 33.6 31.5 - 36.5 g/dL    RDW 13.9 10.0 - 15.0 %    Platelet Count 165 150 - 450 10e3/uL    % Neutrophils 90 %    % Lymphocytes 8 %    % Monocytes 2 %    % Eosinophils 0 %    % Basophils 0 %    % Immature Granulocytes 0 %    NRBCs per 100 WBC 0 <1 /100    Absolute Neutrophils 4.7 1.6 - 8.3 10e3/uL    Absolute Lymphocytes 0.4 (L) 0.8 - 5.3 10e3/uL    Absolute Monocytes 0.1 0.0 - 1.3 10e3/uL    Absolute Eosinophils 0.0 0.0 - 0.7 10e3/uL    Absolute Basophils 0.0 0.0 - 0.2 10e3/uL    Absolute Immature Granulocytes 0.0 <=0.4 10e3/uL    Absolute NRBCs 0.0 10e3/uL   Glucose by meter   Result Value Ref Range    GLUCOSE BY METER POCT 126 (H) 70 - 99 mg/dL       RADIOLOGY:  I have independently reviewed and interpreted the above imaging, pending the final radiology read.  CTA Head Neck with Contrast   Preliminary Result   IMPRESSION:    HEAD CT:   1.  No finding for intracranial hemorrhage or mass or convincing finding for acute infarct.      2.  Postsurgical changes relating to prior left parietal craniotomy. Broad area of encephalomalacic change gliosis is seen within the subjacent left frontal lobe and surgical clips are seen in the region of the pineal gland.      3.  There is marked dilatation of the atrium of the left lateral ventricle with an apparent arachnoid cyst bowing the septum pellucidum to the right. There is prominent enlargement of the left temporal horn and occipital horn without enlargement of the    left frontal horn suggesting postobstructive dilatation of the temporal and occipital horns.      4.  If prior studies can be provided for comparison, a comparison will be made and an addendum issued. If no prior studies are available for comparison, consider correlation with MRI for further assessment.      5.  Mild volume loss and presumed sequela chronic microvascular ischemic  change.      HEAD CTA:    1. No finding for vascular cutoff, aneurysm, or flow-limiting stenosis.   2. Coarse atherosclerotic plaque is seen within both carotid siphons without significant associated luminal stenosis.      NECK CTA:   1.  Moderate atherosclerotic plaque right carotid bulb/bifurcation with mild associated luminal narrowing.      2.  Coarse atherosclerotic plaque is seen within the proximal right vertebral artery with more mild atherosclerotic plaque on the left. There is moderate or high-grade narrowing of the proximal right vertebral artery which, where visualized, is similar    to the 1/22/2019 CTA chest.      Noncontrast head CT findings and lack of vascular cutoff on CTA called to Dr. Wadsworth at 5:44 and 5:53 PM respectively.          EKG:    Normal sinus rhythm at 80 bpm.  Nonspecific ST changes.  No signs of acute ischemia.  QRS 82 ms,  ms.    I have independently reviewed and interpreted this EKG          I, Suzi Morrissey, am serving as a scribe to document services personally performed by Dr. Wadsworth based on my observation and the provider's statements to me. I, Linwood Wadsworth, DO attest that Suzi Morrissey is acting in a scribe capacity, has observed my performance of the services and has documented them in accordance with my direction.    Linwood Wadsworth, DO  Emergency Medicine  [facility] EMERGENCY ROOM  [address] [facility] [address] [phone]  Dept: [phone]       Linwood Wadsworth MD  11/10/22 1944

## 2022-11-11 NOTE — CONSULTS
[facility]    Stroke Telephone Note    I was called by  on 11/10/22 regarding patient Michael Maldonado.     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 NCCT, CTA H/N with chronic postsurgical changes in L parietal lobe from craniotomy with associated encephalomalacia in L frontal lobe and surgical clips. No acute findings or high grade stenosis noted.     In ED /83. Labs pending.     Stroke Code Data (for stroke code without tele)  Stroke code activated 11/10/22   1730   Stroke provider first response  11/10/22   1735            Last known normal 11/10/22   1459        Time of discovery   (or onset of symptoms) 11/10/22   1500   Head CT read by Stroke Neuro Dr/Provider 11/10/22   1800   Was stroke code de-escalated? Yes 11/10/22 1805          Imaging Findings   Reviewed above    Intravenous Thrombolysis  Not given due to:   - stroke mimic: TGA, toxometabolic encephalopathy    Endovascular Treatment  Not initiated due to absence of proximal vessel occlusion    Impressionk/Recommendations  # Stroke mimic. Acute onset confusion w/o focal neurological deficits makes vascular etiology less likely. HPI somewhat suggestive of TGA. Additionally, abnormal electrical activity (known gliosis) vs toxometabolic encephalopathy also on DDx. Small stroke leading to disruption of normal brain hemostasis or strategic stroke affecting FREDRICK/memory is possible but less likely.   - Rec obs admission given still symptomatic  - Rec MRI brain w/wo   - Telestroke Neuro will peripherally follow for MRI results    My recommendations are based on the information provided over the phone by Michael Maldonado's in-person providers. They are not intended to replace the clinical judgment of his in-person providers. I was not  "requested to personally see or examine the patient at this time.    The Stroke Staff is Dr. Nicole.    Cliff Muniz MD  Vascular Neurology Fellow  To page me or covering stroke neurology team member, click here: [facility]   Choose \"On Call\" tab at top, then search dropdown box for \"Neurology Adult\", select location, press Enter, then look for stroke/neuro ICU/telestroke.      "

## 2022-11-11 NOTE — DISCHARGE INSTRUCTIONS
Fortunately your CAT scan today did not show any new findings.  Follow-up with neurology as an outpatient and return to the ER for any worsening symptoms or other concerns.

## 2022-11-15 ENCOUNTER — OFFICE VISIT (OUTPATIENT)
Dept: NEUROLOGY | Facility: CLINIC | Age: 82
End: 2022-11-15
Payer: COMMERCIAL

## 2022-11-15 VITALS
HEIGHT: 68 IN | WEIGHT: 203 LBS | SYSTOLIC BLOOD PRESSURE: 150 MMHG | HEART RATE: 71 BPM | BODY MASS INDEX: 30.77 KG/M2 | DIASTOLIC BLOOD PRESSURE: 85 MMHG

## 2022-11-15 DIAGNOSIS — G45.9 TIA (TRANSIENT ISCHEMIC ATTACK): Primary | ICD-10-CM

## 2022-11-15 PROBLEM — Q28.2 CEREBRAL ARTERIOVENOUS MALFORMATION: Status: ACTIVE | Noted: 2022-11-15

## 2022-11-15 PROBLEM — E03.9 HYPOTHYROIDISM: Status: ACTIVE | Noted: 2022-11-15

## 2022-11-15 PROBLEM — Z86.72 HISTORY OF DEEP VEIN THROMBOPHLEBITIS OF LOWER EXTREMITY: Chronic | Status: RESOLVED | Noted: 2018-11-09 | Resolved: 2022-11-15

## 2022-11-15 PROCEDURE — 99205 OFFICE O/P NEW HI 60 MIN: CPT | Performed by: PSYCHIATRY & NEUROLOGY

## 2022-11-15 NOTE — PROGRESS NOTES
NEUROLOGY CONSULTATION NOTE       [facility] NEUROLOGY [place]  [address] [place] [address]  Tel: [phone]  Fax: [phone]  [email or web address]     Michael Maldonado,  1940, MRN [id number]  PCP: Malvin Caballero  Date: 11/15/2022     ASSESSMENT & PLAN     Visit Diagnosis  1. TIA (transient ischemic attack)     Transient ischemic attack  82-year-old male with history of HTN, HLD, alcohol use, hypothyroidism, cerebral AV malformation s/p craniotomy , parotid gland cancer who was seen in the emergency room recently with transient confusion.  Differential diagnoses include TIA, complex partial seizures and transient global amnesia.  He had CT of the head and CTA that was unremarkable.  He is unable to get MRI scan due to metal clips.  I have recommended:    1.  Continue baby aspirin and simvastatin  2.  Echocardiogram  3.  30-day event monitor  4.  EEG  5.  Follow-up after above tests    Thank you again for this referral, please feel free to contact me if you have any questions.    Carlitos Finch MD  [facility]  (Former[facility], [facility]     REASON FOR CONSULTATION Altered Mental Status        HISTORY OF PRESENT ILLNESS     We have been requested by Dr. Caballero to evaluate Michael Maldonado who is a 82 year old  male for TIA    Patient is a 82-year-old male with history of HTN, HLD, alcohol use, hypothyroidism, cerebral AV malformation s/p craniotomy in , parotid gland cancer who was recently seen in the ER for an episode of confusion.  According to wife he had acute onset when he was asking questions repetitively initially when he presented to the emergency room he had a CT of the head and CTA that was unremarkable aside from some chronic changes.  Patient was discussed with stroke neurology and the recommended admission to the hospital but patient was unwilling and wanted to go home.  As his mental status had improved  he was discharged.  According to wife patient was confused and asking the same question repeatedly.  There is no history of facial droop, focal weakness.  Patient has some recollection of the event.  He recalls drive back home much more clearly and next day felt back to his normal self.  There is no history of any headaches, nausea, vomiting or similar symptoms in the past     PROBLEM LIST   Patient Active Problem List   Diagnosis Code     Hyperlipidemia E78.5     Hypertension I10     Leukopenia D72.819     Hypothyroidism due to acquired atrophy of thyroid E03.4     Status post total knee replacement, right Z96.651     History of head and neck cancer Z85.89     Cancer of parotid gland (H) C07     Cerebral arteriovenous malformation Q28.2     Hypothyroidism E03.9         PAST MEDICAL & SURGICAL HISTORY     Past Medical History:   Patient  has a past medical history of BPH (benign prostatic hyperplasia), Head and neck cancer (H) (4/12/2016), History of deep vein thrombophlebitis of lower extremity (11/9/2018), Hyperlipidemia, Hypertension, Leukopenia, Osteoarthritis, and Stroke (H).    Surgical History:  He  has a past surgical history that includes Craniotomy For Avm (1970); Total Knee Arthroplasty (Left, 2013); Salivary gland surgery (Right, 2011); joint replacement; and TOTAL KNEE ARTHROPLASTY (Right, 1/7/2019).     SOCIAL HISTORY     Reviewed, and he  reports that he quit smoking about 22 years ago. His smoking use included cigars. He has never used smokeless tobacco. He reports current alcohol use of about 1.0 standard drink per week. He reports that he does not use drugs.     FAMILY HISTORY     Reviewed, and family history includes Cirrhosis in his father; Diabetes in his father.     ALLERGIES     No Known Allergies      REVIEW OF SYSTEMS     A 12 point review of system was performed and was negative except as outlined in the history of present illness.     HOME MEDICATIONS     Current Outpatient Rx   Medication  "Sig Dispense Refill     amLODIPine (NORVASC) 5 MG tablet TAKE 1 TABLET BY MOUTH EVERY DAY 90 tablet 3     aspirin (ASA) 81 MG EC tablet Take 1 tablet by mouth every other day       levothyroxine (SYNTHROID/LEVOTHROID) 88 MCG tablet Take 1 tablet (88 mcg) by mouth daily 90 tablet 3     losartan (COZAAR) 50 MG tablet Take 1 tablet (50 mg) by mouth daily 90 tablet 3     omeprazole (PRILOSEC) 20 MG DR capsule TAKE 1 CAPSULE (20 MG TOTAL) BY MOUTH 2 (TWO) TIMES A DAY BEFORE MEALS. 180 capsule 2     simvastatin (ZOCOR) 80 MG tablet Take 0.5 tablets (40 mg) by mouth daily 45 tablet 3         PHYSICAL EXAM     Vital signs  BP (!) 150/85 (BP Location: Right arm, Patient Position: Sitting)   Pulse 71   Ht 1.727 m (5' 8\")   Wt 92.1 kg (203 lb)   BMI 30.87 kg/m      Weight:   203 lbs 0 oz    Elderly gentleman who is alert and oriented vital signs were reviewed and documented in electronic medical record.  Neck was supple.  He has facial deformity due to previous surgery cranial nerves are intact except for sensorineural hearing loss.  He has normal mass and tone with 5/5 strength reflexes 1+ toes equivocal.  He has a wide-based gait and has trouble tandem walking.  Sensation intact with no double simultaneous extinction     PERTINENT DIAGNOSTIC STUDIES     Following studies were reviewed:     CTA HEAD & NECK 11/10/2022  HEAD CT:  1.  No finding for intracranial hemorrhage or mass or convincing finding for acute infarct.     2.  Postsurgical changes relating to prior left parietal craniotomy. Broad area of encephalomalacic change gliosis is seen within the subjacent left frontal lobe and surgical clips are seen in the region of the pineal gland.     3.  There is marked dilatation of the atrium of the left lateral ventricle with an apparent arachnoid cyst bowing the septum pellucidum to the right. There is prominent enlargement of the left temporal horn and occipital horn without enlargement of the   left frontal horn " suggesting postobstructive dilatation of the temporal and occipital horns.     4.  If prior studies can be provided for comparison, a comparison will be made and an addendum issued. If no prior studies are available for comparison, consider correlation with MRI for further assessment.     5.  Mild volume loss and presumed sequela chronic microvascular ischemic change.     HEAD CTA:   1. No finding for vascular cutoff, aneurysm, or flow-limiting stenosis.  2. Coarse atherosclerotic plaque is seen within both carotid siphons without significant associated luminal stenosis.     NECK CTA:  1.  Moderate atherosclerotic plaque right carotid bulb/bifurcation with mild associated luminal narrowing.     2.  Coarse atherosclerotic plaque is seen within the proximal right vertebral artery with more mild atherosclerotic plaque on the left. There is moderate or high-grade narrowing of the proximal right vertebral artery which, where visualized, is similar   to the 1/22/2019 CTA chest.     PERTINENT LABS  Following labs were reviewed:  Admission on 11/10/2022, Discharged on 11/10/2022   Component Date Value     Sodium 11/10/2022 137      Potassium 11/10/2022 4.4      Chloride 11/10/2022 105      Carbon Dioxide (CO2) 11/10/2022 23      Anion Gap 11/10/2022 9      Urea Nitrogen 11/10/2022 18      Creatinine 11/10/2022 1.15      Calcium 11/10/2022 9.2      Glucose 11/10/2022 124      GFR Estimate 11/10/2022 64      INR 11/10/2022 1.05      aPTT 11/10/2022 30      Troponin I 11/10/2022 0.03      Ventricular Rate 11/10/2022 80      Atrial Rate 11/10/2022 80      NH Interval 11/10/2022 242      QRS Duration 11/10/2022 82      QT 11/10/2022 376      QTc 11/10/2022 433      P Axis 11/10/2022 44      R AXIS 11/10/2022 -28      T Axis 11/10/2022 27      Interpretation ECG 11/10/2022                      Value:Sinus rhythm with 1st degree A-V block  Anterior infarct , age undetermined  Abnormal ECG  When compared with ECG of 06-FEB-2020  14:46,  Anterior infarct is now Present  Confirmed by SEE ED PROVIDER NOTE FOR, ECG INTERPRETATION (4000),  Hubert Deng ([id number]) on 11/10/2022 8:35:58 PM       WBC Count 11/10/2022 5.3      RBC Count 11/10/2022 4.92      Hemoglobin 11/10/2022 15.0      Hematocrit 11/10/2022 44.6      MCV 11/10/2022 91      MCH 11/10/2022 30.5      MCHC 11/10/2022 33.6      RDW 11/10/2022 13.9      Platelet Count 11/10/2022 165      % Neutrophils 11/10/2022 90      % Lymphocytes 11/10/2022 8      % Monocytes 11/10/2022 2      % Eosinophils 11/10/2022 0      % Basophils 11/10/2022 0      % Immature Granulocytes 11/10/2022 0      NRBCs per 100 WBC 11/10/2022 0      Absolute Neutrophils 11/10/2022 4.7      Absolute Lymphocytes 11/10/2022 0.4 (L)      Absolute Monocytes 11/10/2022 0.1      Absolute Eosinophils 11/10/2022 0.0      Absolute Basophils 11/10/2022 0.0      Absolute Immature Granul* 11/10/2022 0.0      Absolute NRBCs 11/10/2022 0.0      GLUCOSE BY METER POCT 11/10/2022 126 (H)         Total time spent for face to face visit, reviewing labs/imaging studies, counseling and coordination of care was: 1 Hour spent on the date of the encounter doing chart review, review of outside records, review of test results, interpretation of tests, patient visit and documentation       This note was dictated using voice recognition software.  Any grammatical or context distortions are unintentional and inherent to the software.    Orders Placed This Encounter   Procedures     Cardiac Event Monitor Adult/Pediatric     Echocardiogram Complete     EEG Sleep Deprived      New Prescriptions    No medications on file      Modified Medications    No medications on file

## 2022-11-15 NOTE — LETTER
11/15/2022         RE: Michael Maldonado   [address] [address]  [place] [address]        Dear Colleague,    Thank you for referring your patient, Michael Maldonado, to the [facility] [place]. Please see a copy of my visit note below.    NEUROLOGY CONSULTATION NOTE       [facility] NEUROLOGY [place]  [address] [place] [address]  Tel: [phone]  Fax: [phone]  [email or web address]     Michael Maldonado,  1940, MRN [id number]  PCP: Malvin Caballero  Date: 11/15/2022     ASSESSMENT & PLAN     Visit Diagnosis  1. TIA (transient ischemic attack)     Transient ischemic attack  82-year-old male with history of HTN, HLD, alcohol use, hypothyroidism, cerebral AV malformation s/p craniotomy , parotid gland cancer who was seen in the emergency room recently with transient confusion.  Differential diagnoses include TIA, complex partial seizures and transient global amnesia.  He had CT of the head and CTA that was unremarkable.  He is unable to get MRI scan due to metal clips.  I have recommended:    1.  Continue baby aspirin and simvastatin  2.  Echocardiogram  3.  30-day event monitor  4.  EEG  5.  Follow-up after above tests    Thank you again for this referral, please feel free to contact me if you have any questions.    Carlitos Finch MD  [facility]  (Formerly, [facility]     REASON FOR CONSULTATION Altered Mental Status        HISTORY OF PRESENT ILLNESS     We have been requested by Dr. Caballero to evaluate Michael Maldonado who is a 82 year old  male for TIA    Patient is a 82-year-old male with history of HTN, HLD, alcohol use, hypothyroidism, cerebral AV malformation s/p craniotomy in , parotid gland cancer who was recently seen in the ER for an episode of confusion.  According to wife he had acute onset when he was asking questions repetitively initially when he presented to the emergency  room he had a CT of the head and CTA that was unremarkable aside from some chronic changes.  Patient was discussed with stroke neurology and the recommended admission to the hospital but patient was unwilling and wanted to go home.  As his mental status had improved he was discharged.  According to wife patient was confused and asking the same question repeatedly.  There is no history of facial droop, focal weakness.  Patient has some recollection of the event.  He recalls drive back home much more clearly and next day felt back to his normal self.  There is no history of any headaches, nausea, vomiting or similar symptoms in the past     PROBLEM LIST   Patient Active Problem List   Diagnosis Code     Hyperlipidemia E78.5     Hypertension I10     Leukopenia D72.819     Hypothyroidism due to acquired atrophy of thyroid E03.4     Status post total knee replacement, right Z96.651     History of head and neck cancer Z85.89     Cancer of parotid gland (H) C07     Cerebral arteriovenous malformation Q28.2     Hypothyroidism E03.9         PAST MEDICAL & SURGICAL HISTORY     Past Medical History:   Patient  has a past medical history of BPH (benign prostatic hyperplasia), Head and neck cancer (H) (4/12/2016), History of deep vein thrombophlebitis of lower extremity (11/9/2018), Hyperlipidemia, Hypertension, Leukopenia, Osteoarthritis, and Stroke (H).    Surgical History:  He  has a past surgical history that includes Craniotomy For Avm (1970); Total Knee Arthroplasty (Left, 2013); Salivary gland surgery (Right, 2011); joint replacement; and TOTAL KNEE ARTHROPLASTY (Right, 1/7/2019).     SOCIAL HISTORY     Reviewed, and he  reports that he quit smoking about 22 years ago. His smoking use included cigars. He has never used smokeless tobacco. He reports current alcohol use of about 1.0 standard drink per week. He reports that he does not use drugs.     FAMILY HISTORY     Reviewed, and family history includes Cirrhosis in his  "father; Diabetes in his father.     ALLERGIES     No Known Allergies      REVIEW OF SYSTEMS     A 12 point review of system was performed and was negative except as outlined in the history of present illness.     HOME MEDICATIONS     Current Outpatient Rx   Medication Sig Dispense Refill     amLODIPine (NORVASC) 5 MG tablet TAKE 1 TABLET BY MOUTH EVERY DAY 90 tablet 3     aspirin (ASA) 81 MG EC tablet Take 1 tablet by mouth every other day       levothyroxine (SYNTHROID/LEVOTHROID) 88 MCG tablet Take 1 tablet (88 mcg) by mouth daily 90 tablet 3     losartan (COZAAR) 50 MG tablet Take 1 tablet (50 mg) by mouth daily 90 tablet 3     omeprazole (PRILOSEC) 20 MG DR capsule TAKE 1 CAPSULE (20 MG TOTAL) BY MOUTH 2 (TWO) TIMES A DAY BEFORE MEALS. 180 capsule 2     simvastatin (ZOCOR) 80 MG tablet Take 0.5 tablets (40 mg) by mouth daily 45 tablet 3         PHYSICAL EXAM     Vital signs  BP (!) 150/85 (BP Location: Right arm, Patient Position: Sitting)   Pulse 71   Ht 1.727 m (5' 8\")   Wt 92.1 kg (203 lb)   BMI 30.87 kg/m      Weight:   203 lbs 0 oz    Elderly gentleman who is alert and oriented vital signs were reviewed and documented in electronic medical record.  Neck was supple.  He has facial deformity due to previous surgery cranial nerves are intact except for sensorineural hearing loss.  He has normal mass and tone with 5/5 strength reflexes 1+ toes equivocal.  He has a wide-based gait and has trouble tandem walking.  Sensation intact with no double simultaneous extinction     PERTINENT DIAGNOSTIC STUDIES     Following studies were reviewed:     CTA HEAD & NECK 11/10/2022  HEAD CT:  1.  No finding for intracranial hemorrhage or mass or convincing finding for acute infarct.     2.  Postsurgical changes relating to prior left parietal craniotomy. Broad area of encephalomalacic change gliosis is seen within the subjacent left frontal lobe and surgical clips are seen in the region of the pineal gland.     3.  There " is marked dilatation of the atrium of the left lateral ventricle with an apparent arachnoid cyst bowing the septum pellucidum to the right. There is prominent enlargement of the left temporal horn and occipital horn without enlargement of the   left frontal horn suggesting postobstructive dilatation of the temporal and occipital horns.     4.  If prior studies can be provided for comparison, a comparison will be made and an addendum issued. If no prior studies are available for comparison, consider correlation with MRI for further assessment.     5.  Mild volume loss and presumed sequela chronic microvascular ischemic change.     HEAD CTA:   1. No finding for vascular cutoff, aneurysm, or flow-limiting stenosis.  2. Coarse atherosclerotic plaque is seen within both carotid siphons without significant associated luminal stenosis.     NECK CTA:  1.  Moderate atherosclerotic plaque right carotid bulb/bifurcation with mild associated luminal narrowing.     2.  Coarse atherosclerotic plaque is seen within the proximal right vertebral artery with more mild atherosclerotic plaque on the left. There is moderate or high-grade narrowing of the proximal right vertebral artery which, where visualized, is similar   to the 1/22/2019 CTA chest.     PERTINENT LABS  Following labs were reviewed:  Admission on 11/10/2022, Discharged on 11/10/2022   Component Date Value     Sodium 11/10/2022 137      Potassium 11/10/2022 4.4      Chloride 11/10/2022 105      Carbon Dioxide (CO2) 11/10/2022 23      Anion Gap 11/10/2022 9      Urea Nitrogen 11/10/2022 18      Creatinine 11/10/2022 1.15      Calcium 11/10/2022 9.2      Glucose 11/10/2022 124      GFR Estimate 11/10/2022 64      INR 11/10/2022 1.05      aPTT 11/10/2022 30      Troponin I 11/10/2022 0.03      Ventricular Rate 11/10/2022 80      Atrial Rate 11/10/2022 80      DE Interval 11/10/2022 242      QRS Duration 11/10/2022 82      QT 11/10/2022 376      QTc 11/10/2022 433      P  Axis 11/10/2022 44      R AXIS 11/10/2022 -28      T Axis 11/10/2022 27      Interpretation ECG 11/10/2022                      Value:Sinus rhythm with 1st degree A-V block  Anterior infarct , age undetermined  Abnormal ECG  When compared with ECG of 06-FEB-2020 14:46,  Anterior infarct is now Present  Confirmed by SEE ED PROVIDER NOTE FOR, ECG INTERPRETATION (4000),  Hubert Deng ([id number]) on 11/10/2022 8:35:58 PM       WBC Count 11/10/2022 5.3      RBC Count 11/10/2022 4.92      Hemoglobin 11/10/2022 15.0      Hematocrit 11/10/2022 44.6      MCV 11/10/2022 91      MCH 11/10/2022 30.5      MCHC 11/10/2022 33.6      RDW 11/10/2022 13.9      Platelet Count 11/10/2022 165      % Neutrophils 11/10/2022 90      % Lymphocytes 11/10/2022 8      % Monocytes 11/10/2022 2      % Eosinophils 11/10/2022 0      % Basophils 11/10/2022 0      % Immature Granulocytes 11/10/2022 0      NRBCs per 100 WBC 11/10/2022 0      Absolute Neutrophils 11/10/2022 4.7      Absolute Lymphocytes 11/10/2022 0.4 (L)      Absolute Monocytes 11/10/2022 0.1      Absolute Eosinophils 11/10/2022 0.0      Absolute Basophils 11/10/2022 0.0      Absolute Immature Granul* 11/10/2022 0.0      Absolute NRBCs 11/10/2022 0.0      GLUCOSE BY METER POCT 11/10/2022 126 (H)         Total time spent for face to face visit, reviewing labs/imaging studies, counseling and coordination of care was: 1 Hour spent on the date of the encounter doing chart review, review of outside records, review of test results, interpretation of tests, patient visit and documentation       This note was dictated using voice recognition software.  Any grammatical or context distortions are unintentional and inherent to the software.    Orders Placed This Encounter   Procedures     Cardiac Event Monitor Adult/Pediatric     Echocardiogram Complete     EEG Sleep Deprived      New Prescriptions    No medications on file      Modified Medications    No medications on file               Again, thank you for allowing me to participate in the care of your patient.        Sincerely,        Carlitos Finch MD

## 2022-11-17 ENCOUNTER — TRANSFERRED RECORDS (OUTPATIENT)
Dept: HEALTH INFORMATION MANAGEMENT | Facility: CLINIC | Age: 82
End: 2022-11-17

## 2022-11-23 ENCOUNTER — ANCILLARY PROCEDURE (OUTPATIENT)
Dept: NEUROLOGY | Facility: CLINIC | Age: 82
End: 2022-11-23
Attending: PSYCHIATRY & NEUROLOGY
Payer: COMMERCIAL

## 2022-11-23 DIAGNOSIS — G45.9 TIA (TRANSIENT ISCHEMIC ATTACK): ICD-10-CM

## 2022-11-23 PROCEDURE — 95816 EEG AWAKE AND DROWSY: CPT | Performed by: PSYCHIATRY & NEUROLOGY

## 2022-11-25 ENCOUNTER — TELEPHONE (OUTPATIENT)
Dept: NEUROLOGY | Facility: CLINIC | Age: 82
End: 2022-11-25

## 2022-11-25 DIAGNOSIS — G40.209 PARTIAL SYMPTOMATIC EPILEPSY WITH COMPLEX PARTIAL SEIZURES, NOT INTRACTABLE, WITHOUT STATUS EPILEPTICUS (H): Primary | ICD-10-CM

## 2022-11-25 RX ORDER — LEVETIRACETAM 500 MG/1
500 TABLET ORAL 2 TIMES DAILY
Qty: 60 TABLET | Refills: 11 | Status: SHIPPED | OUTPATIENT
Start: 2022-11-25 | End: 2023-08-01

## 2022-11-25 NOTE — TELEPHONE ENCOUNTER
[facility] Call Center    Phone Message    May a detailed message be left on voicemail: yes     Reason for Call: Other: Pt spouse is calling about Pt wanting to know if he can drive still. Please call Pt spouse back.     Action Taken: Message routed to:  Other: [facility]    Travel Screening: Not Applicable

## 2022-11-25 NOTE — RESULT ENCOUNTER NOTE
Dear Michael,   EEG is abnormal suggesting low threshold for seizure.  Transient confusion likely was a manifestation of seizure.  Also changes noted on EEG due to previous surgery.  I would recommend starting Keppra 500 mg twice daily.  I have sent a prescription to your pharmacy.  You will need Keppra level checked after 2 weeks.  Please let me know if you have any questions.  Follow-up as scheduled.    Carlitos Finch MD

## 2022-11-28 ENCOUNTER — TRANSFERRED RECORDS (OUTPATIENT)
Dept: HEALTH INFORMATION MANAGEMENT | Facility: CLINIC | Age: 82
End: 2022-11-28

## 2022-11-28 NOTE — TELEPHONE ENCOUNTER
As I had mentioned in my chart message to the patient, his EEG was abnormal suggesting low threshold for seizure.  He was started on Keppra and is scheduled to check Keppra level in 2 weeks.  He was seen in the emergency room on 11/10/2022 with confusion that likely was a seizure and he should not drive for 3 months since that episode.  If he remains symptom-free on Keppra, he can start driving on 2/10/2023.    Echocardiogram and 30-day event monitor are still pending

## 2022-11-28 NOTE — TELEPHONE ENCOUNTER
Barbie informed and verbalized understanding  No further questions at this time  Nilda Reina, CMA on 11/28/2022 at 3:11 PM

## 2022-12-12 ENCOUNTER — LAB (OUTPATIENT)
Dept: LAB | Facility: CLINIC | Age: 82
End: 2022-12-12
Payer: COMMERCIAL

## 2022-12-12 DIAGNOSIS — G40.209 PARTIAL SYMPTOMATIC EPILEPSY WITH COMPLEX PARTIAL SEIZURES, NOT INTRACTABLE, WITHOUT STATUS EPILEPTICUS (H): ICD-10-CM

## 2022-12-12 LAB — LEVETIRACETAM SERPL-MCNC: 19.7 ΜG/ML (ref 10–40)

## 2022-12-12 PROCEDURE — 36415 COLL VENOUS BLD VENIPUNCTURE: CPT

## 2022-12-12 PROCEDURE — 80177 DRUG SCRN QUAN LEVETIRACETAM: CPT

## 2022-12-14 ENCOUNTER — TRANSFERRED RECORDS (OUTPATIENT)
Dept: HEALTH INFORMATION MANAGEMENT | Facility: CLINIC | Age: 82
End: 2022-12-14

## 2022-12-19 DIAGNOSIS — G40.209 PARTIAL SYMPTOMATIC EPILEPSY WITH COMPLEX PARTIAL SEIZURES, NOT INTRACTABLE, WITHOUT STATUS EPILEPTICUS (H): ICD-10-CM

## 2022-12-21 ENCOUNTER — HOSPITAL ENCOUNTER (OUTPATIENT)
Dept: CARDIOLOGY | Facility: CLINIC | Age: 82
Discharge: HOME | End: 2022-12-21
Attending: PSYCHIATRY & NEUROLOGY | Admitting: PSYCHIATRY & NEUROLOGY
Payer: COMMERCIAL

## 2022-12-21 DIAGNOSIS — G45.9 TIA (TRANSIENT ISCHEMIC ATTACK): ICD-10-CM

## 2022-12-21 LAB — LVEF ECHO: NORMAL

## 2022-12-21 PROCEDURE — 93306 TTE W/DOPPLER COMPLETE: CPT | Mod: 26 | Performed by: INTERNAL MEDICINE

## 2022-12-21 PROCEDURE — 999N000208 ECHOCARDIOGRAM COMPLETE

## 2022-12-21 PROCEDURE — 93306 TTE W/DOPPLER COMPLETE: CPT

## 2022-12-21 RX ORDER — LEVETIRACETAM 500 MG/1
TABLET ORAL
Qty: 60 TABLET | Refills: 11 | OUTPATIENT
Start: 2022-12-21

## 2022-12-21 NOTE — TELEPHONE ENCOUNTER
DENY for refills on file   Pharmacy informed     Outpatient Medication Detail     Disp Refills Start End JIM   levETIRAcetam (KEPPRA) 500 MG tablet 60 tablet 11 11/25/2022  --   Sig - Route: Take 1 tablet (500 mg) by mouth 2 times daily - Oral   Sent to pharmacy as: levETIRAcetam 500 MG Oral Tablet (Keppra)   Class: E-Prescribe   Order: [id number]   E-Prescribing Status: Receipt confirmed by pharmacy (11/25/2022  9:25 AM CST)

## 2022-12-28 ENCOUNTER — HOSPITAL ENCOUNTER (OUTPATIENT)
Dept: CARDIOLOGY | Facility: CLINIC | Age: 82
Discharge: HOME | End: 2022-12-28
Attending: PSYCHIATRY & NEUROLOGY | Admitting: PSYCHIATRY & NEUROLOGY
Payer: COMMERCIAL

## 2022-12-28 DIAGNOSIS — G45.9 TIA (TRANSIENT ISCHEMIC ATTACK): ICD-10-CM

## 2022-12-28 PROCEDURE — 93270 REMOTE 30 DAY ECG REV/REPORT: CPT

## 2022-12-29 NOTE — TELEPHONE ENCOUNTER
[facility] Call Center    Phone Message    May a detailed message be left on voicemail: yes     Reason for Call: Medication Refill Request    Has the patient contacted the pharmacy for the refill? Yes   Name of medication being requested: Levetiracetam 500 mg  Provider who prescribed the medication: [place]  Pharmacy: [place]    Date medication is needed: ASAP    Also, patient requests a call back.  He needs to know if he needs this prescription and he wants to speak to someone if he can drive now.  Please call patient back or his wife.         Action Taken: Message routed to:  [facility]    Travel Screening: Not Applicable

## 2023-01-02 NOTE — TELEPHONE ENCOUNTER
I called and spoke with Michael and Barbie- I let Michael know he needs to continue his Keppra 500mg BID and cannot drive until Feb 10, 2023 as long as he remains seizure free.   I did call [facility] and they do have his prescription   We went over EEG results again and explained why he cannot drive  He verbalized understanding with no further questions at this time  Nilda Reina, UZIEL on 1/2/2023 at 10:53 AM

## 2023-01-16 ENCOUNTER — TRANSFERRED RECORDS (OUTPATIENT)
Dept: HEALTH INFORMATION MANAGEMENT | Facility: CLINIC | Age: 83
End: 2023-01-16

## 2023-01-23 ENCOUNTER — TRANSFERRED RECORDS (OUTPATIENT)
Dept: HEALTH INFORMATION MANAGEMENT | Facility: CLINIC | Age: 83
End: 2023-01-23

## 2023-01-25 ENCOUNTER — OFFICE VISIT (OUTPATIENT)
Dept: INTERNAL MEDICINE | Facility: CLINIC | Age: 83
End: 2023-01-25
Payer: COMMERCIAL

## 2023-01-25 VITALS
WEIGHT: 200 LBS | TEMPERATURE: 97.3 F | SYSTOLIC BLOOD PRESSURE: 108 MMHG | HEIGHT: 68 IN | BODY MASS INDEX: 30.31 KG/M2 | RESPIRATION RATE: 20 BRPM | DIASTOLIC BLOOD PRESSURE: 62 MMHG | OXYGEN SATURATION: 98 % | HEART RATE: 83 BPM

## 2023-01-25 DIAGNOSIS — K21.9 GASTROESOPHAGEAL REFLUX DISEASE WITHOUT ESOPHAGITIS: ICD-10-CM

## 2023-01-25 DIAGNOSIS — I10 PRIMARY HYPERTENSION: ICD-10-CM

## 2023-01-25 DIAGNOSIS — I65.23 CAROTID ATHEROSCLEROSIS, BILATERAL: ICD-10-CM

## 2023-01-25 DIAGNOSIS — Z00.00 ROUTINE GENERAL MEDICAL EXAMINATION AT A HEALTH CARE FACILITY: Primary | ICD-10-CM

## 2023-01-25 DIAGNOSIS — E03.4 HYPOTHYROIDISM DUE TO ACQUIRED ATROPHY OF THYROID: Chronic | ICD-10-CM

## 2023-01-25 DIAGNOSIS — I67.2 ATHEROSCLEROSIS OF VERTEBRAL ARTERY: ICD-10-CM

## 2023-01-25 DIAGNOSIS — G40.209 PARTIAL SYMPTOMATIC EPILEPSY WITH COMPLEX PARTIAL SEIZURES, NOT INTRACTABLE, WITHOUT STATUS EPILEPTICUS (H): ICD-10-CM

## 2023-01-25 DIAGNOSIS — I10 ESSENTIAL (PRIMARY) HYPERTENSION: ICD-10-CM

## 2023-01-25 DIAGNOSIS — E78.2 MIXED HYPERLIPIDEMIA: Chronic | ICD-10-CM

## 2023-01-25 DIAGNOSIS — R73.09 ELEVATED GLUCOSE LEVEL: ICD-10-CM

## 2023-01-25 LAB
ALBUMIN SERPL BCG-MCNC: 4.7 G/DL (ref 3.5–5.2)
ALP SERPL-CCNC: 68 U/L (ref 40–129)
ALT SERPL W P-5'-P-CCNC: 22 U/L (ref 10–50)
ANION GAP SERPL CALCULATED.3IONS-SCNC: 15 MMOL/L (ref 7–15)
AST SERPL W P-5'-P-CCNC: 28 U/L (ref 10–50)
BILIRUB SERPL-MCNC: 0.5 MG/DL
BUN SERPL-MCNC: 22.2 MG/DL (ref 8–23)
CALCIUM SERPL-MCNC: 9.7 MG/DL (ref 8.8–10.2)
CHLORIDE SERPL-SCNC: 107 MMOL/L (ref 98–107)
CHOLEST SERPL-MCNC: 178 MG/DL
CREAT SERPL-MCNC: 1.1 MG/DL (ref 0.67–1.17)
DEPRECATED HCO3 PLAS-SCNC: 19 MMOL/L (ref 22–29)
ERYTHROCYTE [DISTWIDTH] IN BLOOD BY AUTOMATED COUNT: 13.4 % (ref 10–15)
GFR SERPL CREATININE-BSD FRML MDRD: 67 ML/MIN/1.73M2
GLUCOSE SERPL-MCNC: 124 MG/DL (ref 70–99)
HBA1C MFR BLD: 5.9 % (ref 0–5.6)
HCT VFR BLD AUTO: 46.1 % (ref 40–53)
HDLC SERPL-MCNC: 63 MG/DL
HGB BLD-MCNC: 15.5 G/DL (ref 13.3–17.7)
LDLC SERPL CALC-MCNC: 102 MG/DL
MCH RBC QN AUTO: 30.9 PG (ref 26.5–33)
MCHC RBC AUTO-ENTMCNC: 33.6 G/DL (ref 31.5–36.5)
MCV RBC AUTO: 92 FL (ref 78–100)
NONHDLC SERPL-MCNC: 115 MG/DL
PLATELET # BLD AUTO: 161 10E3/UL (ref 150–450)
POTASSIUM SERPL-SCNC: 4.4 MMOL/L (ref 3.4–5.3)
PROT SERPL-MCNC: 7.2 G/DL (ref 6.4–8.3)
RBC # BLD AUTO: 5.01 10E6/UL (ref 4.4–5.9)
SODIUM SERPL-SCNC: 141 MMOL/L (ref 136–145)
TRIGL SERPL-MCNC: 63 MG/DL
TSH SERPL DL<=0.005 MIU/L-ACNC: 1.56 UIU/ML (ref 0.3–4.2)
WBC # BLD AUTO: 10.9 10E3/UL (ref 4–11)

## 2023-01-25 PROCEDURE — 36415 COLL VENOUS BLD VENIPUNCTURE: CPT | Performed by: INTERNAL MEDICINE

## 2023-01-25 PROCEDURE — 83036 HEMOGLOBIN GLYCOSYLATED A1C: CPT | Performed by: INTERNAL MEDICINE

## 2023-01-25 PROCEDURE — G0438 PPPS, INITIAL VISIT: HCPCS | Performed by: INTERNAL MEDICINE

## 2023-01-25 PROCEDURE — 84443 ASSAY THYROID STIM HORMONE: CPT | Performed by: INTERNAL MEDICINE

## 2023-01-25 PROCEDURE — 80053 COMPREHEN METABOLIC PANEL: CPT | Performed by: INTERNAL MEDICINE

## 2023-01-25 PROCEDURE — 80061 LIPID PANEL: CPT | Performed by: INTERNAL MEDICINE

## 2023-01-25 PROCEDURE — 99214 OFFICE O/P EST MOD 30 MIN: CPT | Mod: 25 | Performed by: INTERNAL MEDICINE

## 2023-01-25 PROCEDURE — 85027 COMPLETE CBC AUTOMATED: CPT | Performed by: INTERNAL MEDICINE

## 2023-01-25 RX ORDER — LOSARTAN POTASSIUM 50 MG/1
50 TABLET ORAL DAILY
Qty: 90 TABLET | Refills: 3 | Status: SHIPPED | OUTPATIENT
Start: 2023-01-25 | End: 2024-04-01

## 2023-01-25 ASSESSMENT — ENCOUNTER SYMPTOMS
DYSURIA: 0
HEMATOCHEZIA: 0
NAUSEA: 0
SHORTNESS OF BREATH: 0
FEVER: 0
HEADACHES: 0
JOINT SWELLING: 0
PARESTHESIAS: 0
FREQUENCY: 0
NERVOUS/ANXIOUS: 0
ARTHRALGIAS: 1
DIZZINESS: 0
HEARTBURN: 0
SORE THROAT: 0
HEMATURIA: 0
MYALGIAS: 0
COUGH: 0
PALPITATIONS: 0
ABDOMINAL PAIN: 0
CONSTIPATION: 0
WEAKNESS: 0
DIARRHEA: 0
CHILLS: 0
EYE PAIN: 0

## 2023-01-25 ASSESSMENT — ACTIVITIES OF DAILY LIVING (ADL): CURRENT_FUNCTION: NO ASSISTANCE NEEDED

## 2023-01-25 NOTE — PROGRESS NOTES
"SUBJECTIVE:   Michael is a 82 year old who presents for Preventive Visit.  Patient has been advised of split billing requirements and indicates understanding: Yes  Are you in the first 12 months of your Medicare coverage?  No    Healthy Habits:     In general, how would you rate your overall health?  Excellent    Frequency of exercise:  2-3 days/week    Duration of exercise:  15-30 minutes    Do you usually eat at least 4 servings of fruit and vegetables a day, include whole grains    & fiber and avoid regularly eating high fat or \"junk\" foods?  Yes    Taking medications regularly:  Yes    Medication side effects:  None    Ability to successfully perform activities of daily living:  No assistance needed    Home Safety:  No safety concerns identified    Hearing Impairment:  No hearing concerns    In the past 6 months, have you been bothered by leaking of urine?  No    In general, how would you rate your overall mental or emotional health?  Good      PHQ-2 Total Score: 0    Additional concerns today:  No      Have you ever done Advance Care Planning? (For example, a Health Directive, POLST, or a discussion with a medical provider or your loved ones about your wishes): Yes, advance care planning is on file.       Fall risk  Fallen 2 or more times in the past year?: No  Any fall with injury in the past year?: No    Cognitive Screening   1) Repeat 3 items (Leader, Season, Table)    2) Clock draw: NORMAL  3) 3 item recall: Recalls NO objects   Results: 0 items recalled: PROBABLE COGNITIVE IMPAIRMENT, **INFORM PROVIDER**    Mini-CogTM Copyright JOSE Grover. Licensed by the author for use in [facility]; reprinted with permission (matilde@.[facility]). All rights reserved.      Do you have sleep apnea, excessive snoring or daytime drowsiness?: no    Reviewed and updated as needed this visit by clinical staff   Tobacco  Allergies  Meds              Reviewed and updated as needed this visit by Provider               "   Social History     Tobacco Use     Smoking status: Former     Types: Cigars     Quit date: 2000     Years since quittin.0     Smokeless tobacco: Never   Substance Use Topics     Alcohol use: Yes     Alcohol/week: 1.0 standard drink     Comment: Alcoholic Drinks/day: nightly     If you drink alcohol do you typically have >3 drinks per day or >7 drinks per week? No    Alcohol Use 2023   Prescreen: >3 drinks/day or >7 drinks/week? No   Prescreen: >3 drinks/day or >7 drinks/week? -     Current providers sharing in care for this patient include:   Patient Care Team:  Malvin Caballero MD as PCP - General (Internal Medicine)  Elke Rico PharmD as Pharmacist (Pharmacist)  Malvin Caballero MD as Assigned PCP  Tayler Chambers APRN CNP as Nurse Practitioner (Neurology)  Carlitos Finch MD as Assigned Neuroscience Provider    The following health maintenance items are reviewed in Epic and correct as of today:  Health Maintenance   Topic Date Due     ANNUAL REVIEW OF HM ORDERS  Never done     DTAP/TDAP/TD IMMUNIZATION (2 - Td or Tdap) 08/10/2022     INFLUENZA VACCINE (1) 2022     MEDICARE ANNUAL WELLNESS VISIT  2023     FALL RISK ASSESSMENT  2024     ADVANCE CARE PLANNING  2027     PHQ-2 (once per calendar year)  Completed     Pneumococcal Vaccine: 65+ Years  Completed     ZOSTER IMMUNIZATION  Completed     COVID-19 Vaccine  Completed     IPV IMMUNIZATION  Aged Out     MENINGITIS IMMUNIZATION  Aged Out       Review of Systems   Constitutional: Negative for chills and fever.   HENT: Negative for congestion, ear pain, hearing loss and sore throat.    Eyes: Negative for pain and visual disturbance.   Respiratory: Negative for cough and shortness of breath.    Cardiovascular: Negative for chest pain, palpitations and peripheral edema.   Gastrointestinal: Negative for abdominal pain, constipation, diarrhea, heartburn, hematochezia and nausea.   Genitourinary: Negative for  "dysuria, frequency, genital sores, hematuria, impotence, penile discharge and urgency.   Musculoskeletal: Positive for arthralgias. Negative for joint swelling and myalgias.   Skin: Negative for rash.   Neurological: Negative for dizziness, weakness, headaches and paresthesias.   Psychiatric/Behavioral: Negative for mood changes. The patient is not nervous/anxious.          OBJECTIVE:   /62 (BP Location: Right arm, Patient Position: Sitting, Cuff Size: Adult Large)   Pulse 83   Temp 97.3  F (36.3  C)   Resp 20   Ht 1.727 m (5' 8\")   Wt 90.7 kg (200 lb)   SpO2 98%   BMI 30.41 kg/m   Estimated body mass index is 30.41 kg/m  as calculated from the following:    Height as of this encounter: 1.727 m (5' 8\").    Weight as of this encounter: 90.7 kg (200 lb).  Physical Exam    General: Alert, in no distress  All looks just great today.  Normal affect, fluent speech.    Skin: + Actinic changes scalp.  Large seborrheic keratosis on his right upper back  Eyes/nose/throat: Eyes without scleral icterus, eye movements normal, pupils equal and reactive, oropharynx clear,   + Bilateral hearing aids  MSK: Neck with good ROM  + Left shoulder has recovered pretty good range of motion.  Lymphatic: Neck without adenopathy or masses  Endocrine: Thyroid with no nodules to palpation  Pulm: Lungs clear to auscultation bilaterally  Cardiac: Heart with regular rate and rhythm, no murmur or gallop  GI: Abdomen soft, nontender. No palpable enlargement of liver or spleen  MSK: Extremities no tenderness or edema  Neuro: Moves all extremities, without focal weakness  + Per his baseline, Michael did have difficulty drawing a clock face, and could recall only 0 out of 3 objects on short-term memory testing  Psych: Alert, normal mental status. Normal affect and speech      ASSESSMENT / PLAN:     Annual wellness visit, and also follow-up after an emergency department visit November 10, 2022 where he was diagnosed with partial complex " seizure, and has been started on Keppra, under the supervision of Dr. Finch of [facility] neurology.    Michael is tolerating that medication just fine.  He will be following up with Dr. Finch, and possibly may be able to resume driving later  February 10, assuming he remains seizure-free.    Today January 25, 2023, will get routine lab work of comprehensive metabolic panel, blood cell counts, TSH to monitor levothyroxine therapy, and lipid panel.    Issues are as follows    Partial complex seizure presenting as acute confusional state November 10, 2022, EEG November 23, 2022 was suggestive of seizure activity, no signs of stroke, started on Keppra 500 mg twice a day    History of craniotomy for an arteriovenous malformation in 1970, with good long-term recovery    Atherosclerotic deposits in both carotid siphons and right carotid bulb/bifurcation with mild associated luminal narrowing, and plaque in the proximal right vertebral artery, milder atherosclerotic plaque left vertebral artery, by CT angiography November 10, 2022    Michael takes high-dose simvastatin and also baby aspirin, and has good blood pressure control.    He has undergone a very thorough work-up that started in the emergency department on November 10, 2022, with additional testing orchestrated by Dr. Finch    HEAD CTA:   1. No finding for vascular cutoff, aneurysm, or flow-limiting stenosis.  2. Coarse atherosclerotic plaque is seen within both carotid siphons without significant associated luminal stenosis.  NECK CTA:  1.  Moderate atherosclerotic plaque right carotid bulb/bifurcation with mild associated luminal narrowing.  2.  Coarse atherosclerotic plaque is seen within the proximal right vertebral artery with more mild atherosclerotic plaque on the left. There is moderate or high-grade narrowing of the proximal right vertebral artery which, where visualized, is similar to the 1/22/2019 CTA chest.     11- EEG  Impression: This is an abnormal  EEG due to asymmetric slowing of the background that is maximally expressed on the left hemisphere.  Asymmetry of the amplitude likely is due to breach effect.  Additionally polyspike discharges are noted in the left parasagittal head region with phase reversal at C3 that suggest a low threshold for seizures.     Classification: Dysrhythmia grade III left parasagittal (C3)                            Dysrhythmia grade II generalized maximum left                            Breach effect    12- echo  The left ventricle is normal in size with mild concentric left ventricular  hypertrophy.  Left ventricular function is normal.The ejection fraction is 60-65%.  Normal right ventricle size and systolic function.  A contrast injection (Bubble Study) was performed that was negative for flow  across the interatrial septum.  Mild age-related valve disease is present without significant stenosis or  regurgitation.  There is no comparison study available.    Hypothyroidism on replacement, on levothyroxine 88 mcg a day   7-  TSH 0.30 - 4.20 uIU/mL 3.31      Left shoulder rotator cuff syndrome, had steroid injection in 2 spots at his left shoulder at [facility] Monday, January 23    Right sided eye surgery to correct strabismus April 15, 2022     Obesity with body mass index of 30.4  has reduced the alcohol consumption, instead of martinis, he will have a small glass of rafael     Wt Readings from Last 5 Encounters:   01/25/23 90.7 kg (200 lb)   11/15/22 92.1 kg (203 lb)   11/10/22 88.5 kg (195 lb)   07/21/22 91.4 kg (201 lb 8 oz)   06/01/22 91.6 kg (202 lb)     Short-term Memory impairment  History of craniotomy for an arteriovenous malformation in 1970, with good long-term recovery.    Could not recall 3 objects today 1-    Essential hypertension, reasonably well controlled on combination of losartan plus amlodipine.  We will keep his blood pressure medicines the same.   BP Readings from Last 6  Encounters:   01/25/23 108/62   11/15/22 (!) 150/85   11/10/22 (!) 142/70   07/21/22 124/68   06/01/22 135/84   04/05/22 108/64     Hyperlipidemia on high-dose simvastatin, lipids reasonably well controlled  Carotid and vertebral artery atherosclerosis  Cholesterol panel from January 21, 2022 showed good control with total cholesterol 159, triglycerides 96, HDL 55, LDL 85.     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       Actinic keratoses, with lesions on his scalp that need to be examined by dermatology, sees them every 3 months.       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       History of deep venous thrombosis associated with the 2019 knee arthroplasty, no longer needs to be on anticoagulation.      History of likely COVID with cold symptoms, week of January 10, 2022, lasted a few days, then his wife tested positive  COVID-19 Vaccine Bivalent Booster 12+ (Pfizer) 10/03/2022       Immunization History   Administered Date(s) Administered     COVID-19 Vaccine 12+ (Pfizer 2022) 04/05/2022     COVID-19 Vaccine 12+ (Pfizer) 02/16/2021, 03/09/2021, 10/04/2021     COVID-19 Vaccine Bivalent Booster 12+ (Pfizer) 10/03/2022     Flu, Unspecified 09/21/2015, 10/11/2017     Influenza (High Dose) 3 valent vaccine 1941, 09/27/2016, 09/26/2018, 09/24/2019     Influenza (IIV3) PF 09/01/2010     Influenza Vaccine 65+ (Fluzone HD) 09/09/2020, 09/28/2021     Pneumo Conj 13-V (2010&after) 05/20/2015     Pneumococcal 23 valent 01/01/2007, 08/06/2010     Td,adult,historic,unspecified 01/01/2008     Tdap (Adacel,Boostrix) 08/10/2012     Zoster vaccine recombinant adjuvanted  "(SHINGRIX) 01/24/2019, 09/19/2019     Zoster vaccine, live 04/25/2012         COUNSELING:  Reviewed preventive health counseling, as reflected in patient instructions       Healthy diet/nutrition      BMI:   Estimated body mass index is 30.41 kg/m  as calculated from the following:    Height as of this encounter: 1.727 m (5' 8\").    Weight as of this encounter: 90.7 kg (200 lb).   Weight management plan: Discussed healthy diet and exercise guidelines      He reports that he quit smoking about 23 years ago. His smoking use included cigars. He has never used smokeless tobacco.      Appropriate preventive services were discussed with this patient, including applicable screening as appropriate for cardiovascular disease, diabetes, osteopenia/osteoporosis, and glaucoma.  As appropriate for age/gender, discussed screening for colorectal cancer, prostate cancer, breast cancer, and cervical cancer. Checklist reviewing preventive services available has been given to the patient.    Reviewed patients plan of care and provided an AVS. The Basic Care Plan (routine screening as documented in Health Maintenance) for Michael meets the Care Plan requirement. This Care Plan has been established and reviewed with the Patient.      LUL SHAFFER MD  [facility]    Identified Health Risks:  "

## 2023-01-25 NOTE — PATIENT INSTRUCTIONS
Annual wellness visit, and also follow-up after an emergency department visit November 10, 2022 where he was diagnosed with partial complex seizure, and has been started on Keppra, under the supervision of Dr. Finch of [facility] neurology.    Michael is tolerating that medication just fine.  He will be following up with Dr. Finch, and possibly may be able to resume driving later  February 10, assuming he remains seizure-free.    Today January 25, 2023, will get routine lab work of comprehensive metabolic panel, blood cell counts, TSH to monitor levothyroxine therapy, and lipid panel.    Issues are as follows    Partial complex seizure presenting as acute confusional state November 10, 2022, EEG November 23, 2022 was suggestive of seizure activity, no signs of stroke, started on Keppra 500 mg twice a day    History of craniotomy for an arteriovenous malformation in 1970, with good long-term recovery    Atherosclerotic deposits in both carotid siphons and right carotid bulb/bifurcation with mild associated luminal narrowing, and plaque in the proximal right vertebral artery, milder atherosclerotic plaque left vertebral artery, by CT angiography November 10, 2022    Michael takes high-dose simvastatin and also baby aspirin, and has good blood pressure control.    He has undergone a very thorough work-up that started in the emergency department on November 10, 2022, with additional testing orchestrated by Dr. Finch    HEAD CTA:   1. No finding for vascular cutoff, aneurysm, or flow-limiting stenosis.  2. Coarse atherosclerotic plaque is seen within both carotid siphons without significant associated luminal stenosis.  NECK CTA:  1.  Moderate atherosclerotic plaque right carotid bulb/bifurcation with mild associated luminal narrowing.  2.  Coarse atherosclerotic plaque is seen within the proximal right vertebral artery with more mild atherosclerotic plaque on the left. There is moderate or high-grade narrowing of the proximal  right vertebral artery which, where visualized, is similar to the 1/22/2019 CTA chest.     11- EEG  Impression: This is an abnormal EEG due to asymmetric slowing of the background that is maximally expressed on the left hemisphere.  Asymmetry of the amplitude likely is due to breach effect.  Additionally polyspike discharges are noted in the left parasagittal head region with phase reversal at C3 that suggest a low threshold for seizures.     Classification: Dysrhythmia grade III left parasagittal (C3)                            Dysrhythmia grade II generalized maximum left                            Breach effect    12- echo  The left ventricle is normal in size with mild concentric left ventricular  hypertrophy.  Left ventricular function is normal.The ejection fraction is 60-65%.  Normal right ventricle size and systolic function.  A contrast injection (Bubble Study) was performed that was negative for flow  across the interatrial septum.  Mild age-related valve disease is present without significant stenosis or  regurgitation.  There is no comparison study available.    Hypothyroidism on replacement, on levothyroxine 88 mcg a day   7-  TSH 0.30 - 4.20 uIU/mL 3.31      Left shoulder rotator cuff syndrome, had steroid injection in 2 spots at his left shoulder at [facility] Monday, January 23    Right sided eye surgery to correct strabismus April 15, 2022     Obesity with body mass index of 30.4  has reduced the alcohol consumption, instead of martinis, he will have a small glass of rafael     Wt Readings from Last 5 Encounters:   01/25/23 90.7 kg (200 lb)   11/15/22 92.1 kg (203 lb)   11/10/22 88.5 kg (195 lb)   07/21/22 91.4 kg (201 lb 8 oz)   06/01/22 91.6 kg (202 lb)     Short-term Memory impairment  History of craniotomy for an arteriovenous malformation in 1970, with good long-term recovery.    Could not recall 3 objects today 1-    Essential hypertension, reasonably well  controlled on combination of losartan plus amlodipine.  We will keep his blood pressure medicines the same.   BP Readings from Last 6 Encounters:   01/25/23 108/62   11/15/22 (!) 150/85   11/10/22 (!) 142/70   07/21/22 124/68   06/01/22 135/84   04/05/22 108/64     Hyperlipidemia on high-dose simvastatin, lipids reasonably well controlled  Carotid and vertebral artery atherosclerosis  Cholesterol panel from January 21, 2022 showed good control with total cholesterol 159, triglycerides 96, HDL 55, LDL 85.     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       Actinic keratoses, with lesions on his scalp that need to be examined by dermatology, sees them every 3 months.       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       History of deep venous thrombosis associated with the 2019 knee arthroplasty, no longer needs to be on anticoagulation.      History of likely COVID with cold symptoms, week of January 10, 2022, lasted a few days, then his wife tested positive  COVID-19 Vaccine Bivalent Booster 12+ (Pfizer) 10/03/2022       Immunization History   Administered Date(s) Administered    COVID-19 Vaccine 12+ (Pfizer 2022) 04/05/2022    COVID-19 Vaccine 12+ (Pfizer) 02/16/2021, 03/09/2021, 10/04/2021    COVID-19 Vaccine Bivalent Booster 12+ (Pfizer) 10/03/2022    Flu, Unspecified 09/21/2015, 10/11/2017    Influenza (High Dose) 3 valent vaccine 1941, 09/27/2016, 09/26/2018, 09/24/2019    Influenza (IIV3) PF 09/01/2010    Influenza Vaccine 65+ (Fluzone HD) 09/09/2020, 09/28/2021    Pneumo Conj 13-V (2010&after) 05/20/2015    Pneumococcal 23 valent 01/01/2007, 08/06/2010     Td,adult,historic,unspecified 01/01/2008    Tdap (Adacel,Boostrix) 08/10/2012    Zoster vaccine recombinant adjuvanted (SHINGRIX) 01/24/2019, 09/19/2019    Zoster vaccine, live 04/25/2012

## 2023-01-27 PROCEDURE — 93272 ECG/REVIEW INTERPRET ONLY: CPT | Performed by: INTERNAL MEDICINE

## 2023-02-01 ENCOUNTER — OFFICE VISIT (OUTPATIENT)
Dept: NEUROLOGY | Facility: CLINIC | Age: 83
End: 2023-02-01
Payer: COMMERCIAL

## 2023-02-01 VITALS
DIASTOLIC BLOOD PRESSURE: 84 MMHG | HEIGHT: 68 IN | SYSTOLIC BLOOD PRESSURE: 138 MMHG | BODY MASS INDEX: 30.31 KG/M2 | HEART RATE: 65 BPM | WEIGHT: 200 LBS

## 2023-02-01 DIAGNOSIS — G40.209 PARTIAL SYMPTOMATIC EPILEPSY WITH COMPLEX PARTIAL SEIZURES, NOT INTRACTABLE, WITHOUT STATUS EPILEPTICUS (H): Primary | ICD-10-CM

## 2023-02-01 DIAGNOSIS — Q28.2 CEREBRAL ARTERIOVENOUS MALFORMATION: ICD-10-CM

## 2023-02-01 PROCEDURE — 99214 OFFICE O/P EST MOD 30 MIN: CPT | Performed by: PSYCHIATRY & NEUROLOGY

## 2023-02-01 NOTE — PROGRESS NOTES
NEUROLOGY FOLLOW UP VISIT  NOTE       [facility] NEUROLOGY [place] [address] [place] [address]  Tel: [phone]  Fax: [phone]  [email or web address]     Michael Maldonado,  1940, MRN [id number]  PCP: Malvin Caballero  Date: 2023      ASSESSMENT & PLAN     Visit Diagnosis  1. Partial symptomatic epilepsy with complex partial seizures, not intractable, without status epilepticus (H)  2. Cerebral arteriovenous malformation     Complex partial seizures  82-year-old male with history of HTN, HLD, alcohol abuse, hypothyroidism, cerebral AV malformation s/p craniotomy , parotid cancer who was initially seen on 11/15/2022 after an episode of confusion.  He had a work-up for TIA that was normal but EEG showed a left parasagittal sharp discharges likely due to glial scar as a result of his craniotomy.  He was started on Keppra and that tolerated well and has not experienced any recurrent symptoms.  He had a Keppra level checked that was normal.  I have recommended:    1.  Continue Keppra 500 mg twice daily.  I have told him he will need Keppra indefinitely  2.  Recently Keppra level was checked and was therapeutic  3.  Patient had his episode on 11/10/2022 and if he remains symptom-free he should be able to start driving to 10/20/2023  4.I reviewed with the patient in detail [place] regulations on seizures and driving. Patient appeared to clearly understand that they prohibited from operating a motor vehicle within 3 months following any seizure or other episode with sudden unconsciousness and that they are required to report any future such seizure to the [facility] within 30 days after the event. I also recommended that patient and family review all other activities, and avoid any activities that might lead to self-injury or injury of others.  Such activities include but are not limited to holding babies or young children at heights from which they might be injured if dropped,  bathing infants or young children in situations in which they might drown without continuous interactive care by an adult who is fully capable at all times during the bath, operating power cutting or other tools, handling firearms, exposure to heights from which she might fall, exposure to vessels with hot cooking oil or water, and swimming alone.   5.  Follow-up will be in 1 year    Thank you again for this referral, please feel free to contact me if you have any questions.    Carlitos Finch MD  [facility]  (Formerly, [facility]     HISTORY OF PRESENT ILLNESS     Patient is a 82-year-old male with history of HTN, HLD, alcohol abuse, hypothyroidism, cerebral AV malformation s/p craniotomy in 1970, parotid gland cancer who was a initially seen on 11/15/2022 after an evaluation in the ER for an episode of confusion. According to wife he had acute onset when he was asking questions repetitively initially when he presented to the emergency room he had a CT of the head and CTA that was unremarkable aside from some chronic changes.  Although admission was recommended he wanted to go home.  He was started on aspirin and simvastatin and since last visit had an EEG that was abnormal showing left parasagittal sharp activity suggesting a low threshold for focal seizure.  30-day event monitor and echocardiogram were normal.  He was started on Keppra and Keppra level was checked that was therapeutic and was told to continue on current dose.  He has not experienced any side effects due to Keppra and also has not experienced any further episodes of confusion     PROBLEM LIST   Patient Active Problem List   Diagnosis Code     Hyperlipidemia E78.5     Hypertension I10     Leukopenia D72.819     Hypothyroidism due to acquired atrophy of thyroid E03.4     Status post total knee replacement, right Z96.651     History of head and neck cancer Z85.89     Cancer of parotid gland  (H) C07     Cerebral arteriovenous malformation Q28.2     Hypothyroidism E03.9     Partial complex seizure disorder without intractable epilepsy (H) G40.209     Carotid atherosclerosis, bilateral I65.23     Atherosclerosis of vertebral artery I67.2         PAST MEDICAL & SURGICAL HISTORY     Past Medical History:   Patient  has a past medical history of BPH (benign prostatic hyperplasia), Head and neck cancer (H) (4/12/2016), History of deep vein thrombophlebitis of lower extremity (11/9/2018), Hyperlipidemia, Hypertension, Leukopenia, Osteoarthritis, and Stroke (H).    Surgical History:  He  has a past surgical history that includes Craniotomy For Avm (1970); Total Knee Arthroplasty (Left, 2013); Salivary gland surgery (Right, 2011); joint replacement; and TOTAL KNEE ARTHROPLASTY (Right, 1/7/2019).     SOCIAL HISTORY     Reviewed, and he  reports that he quit smoking about 23 years ago. His smoking use included cigars. He has never used smokeless tobacco. He reports current alcohol use of about 1.0 standard drink per week. He reports that he does not use drugs.     FAMILY HISTORY     Reviewed, and family history includes Cirrhosis in his father; Diabetes in his father.     ALLERGIES     No Known Allergies      REVIEW OF SYSTEMS     A 12 point review of system was performed and was negative except as outlined in the history of present illness.     HOME MEDICATIONS     Current Outpatient Rx   Medication Sig Dispense Refill     amLODIPine (NORVASC) 5 MG tablet TAKE 1 TABLET BY MOUTH EVERY DAY 90 tablet 3     aspirin (ASA) 81 MG EC tablet Take 1 tablet by mouth every other day       levETIRAcetam (KEPPRA) 500 MG tablet Take 1 tablet (500 mg) by mouth 2 times daily 60 tablet 11     levothyroxine (SYNTHROID/LEVOTHROID) 88 MCG tablet Take 1 tablet (88 mcg) by mouth daily 90 tablet 3     losartan (COZAAR) 50 MG tablet Take 1 tablet (50 mg) by mouth daily 90 tablet 3     omeprazole (PRILOSEC) 20 MG DR capsule TAKE 1 CAPSULE  "(20 MG TOTAL) BY MOUTH 2 (TWO) TIMES A DAY BEFORE MEALS. Strength: 20 mg 180 capsule 2     simvastatin (ZOCOR) 80 MG tablet Take 0.5 tablets (40 mg) by mouth daily 45 tablet 3         PHYSICAL EXAM     Vital signs  /84   Pulse 65   Ht 1.727 m (5' 8\")   Wt 90.7 kg (200 lb)   BMI 30.41 kg/m      Weight:   200 lbs 0 oz    Elderly gentleman who is alert and oriented vital signs were reviewed and documented in electronic medical record.  Neck was supple.  He has facial deformity due to previous surgery cranial nerves are intact except for sensorineural hearing loss.  He has normal mass and tone with 5/5 strength reflexes 1+ toes equivocal.  He has a wide-based gait and has trouble tandem walking.  Sensation intact with no double simultaneous extinction     PERTINENT DIAGNOSTIC STUDIES     Following studies were reviewed:     CTA HEAD & NECK 11/10/2022  HEAD CT:  1.  No finding for intracranial hemorrhage or mass or convincing finding for acute infarct.     2.  Postsurgical changes relating to prior left parietal craniotomy. Broad area of encephalomalacic change gliosis is seen within the subjacent left frontal lobe and surgical clips are seen in the region of the pineal gland.     3.  There is marked dilatation of the atrium of the left lateral ventricle with an apparent arachnoid cyst bowing the septum pellucidum to the right. There is prominent enlargement of the left temporal horn and occipital horn without enlargement of the   left frontal horn suggesting postobstructive dilatation of the temporal and occipital horns.     4.  If prior studies can be provided for comparison, a comparison will be made and an addendum issued. If no prior studies are available for comparison, consider correlation with MRI for further assessment.     5.  Mild volume loss and presumed sequela chronic microvascular ischemic change.     HEAD CTA:   1. No finding for vascular cutoff, aneurysm, or flow-limiting stenosis.  2. Coarse " atherosclerotic plaque is seen within both carotid siphons without significant associated luminal stenosis.     NECK CTA:  1.  Moderate atherosclerotic plaque right carotid bulb/bifurcation with mild associated luminal narrowing.     2.  Coarse atherosclerotic plaque is seen within the proximal right vertebral artery with more mild atherosclerotic plaque on the left. There is moderate or high-grade narrowing of the proximal right vertebral artery which, where visualized, is similar   to the 1/22/2019 CTA chest.    EEG 11/23/2022  This is an abnormal EEG due to asymmetric slowing of the background that is maximally expressed on the left hemisphere. Asymmetry of the amplitude likely is due to breach effect. Additionally polyspike discharges are noted in the left parasagittal head region with phase reversal at C3 that suggest a low threshold for seizures.     30-DAY EVENT MONITOR 12/28/2022  Cardiac event monitoring from 12/28/2022 to 1/26/2023 (monitored duration 16d 19h 17m).  Baseline rhythm was sinus rhythm.    Reported heart rate range 50 to 120bpm, average 71bpm.  No symptom triggers.  Seven automated recordings included sinus rhythm with 1st degree AV delay and rare PACs and PVCs.  No sustained tachyarrhythmias.  No atrial fibrillation.  There were no pauses noted.  Supraventricular and ventricular ectopic beat frequency are not reported on this monitoring modality.      ECHOCARDIOGRAM 12/21/2022  The left ventricle is normal in size with mild concentric left ventricular  hypertrophy.  Left ventricular function is normal.The ejection fraction is 60-65%.  Normal right ventricle size and systolic function.  A contrast injection (Bubble Study) was performed that was negative for flow  across the interatrial septum.  Mild age-related valve disease is present without significant stenosis or  regurgitation.  There is no comparison study available.       PERTINENT LABS  Following labs were reviewed:  Office Visit on  01/25/2023   Component Date Value     TSH 01/25/2023 1.56      Cholesterol 01/25/2023 178      Triglycerides 01/25/2023 63      Direct Measure HDL 01/25/2023 63      LDL Cholesterol Calculat* 01/25/2023 102 (H)      Non HDL Cholesterol 01/25/2023 115      Sodium 01/25/2023 141      Potassium 01/25/2023 4.4      Chloride 01/25/2023 107      Carbon Dioxide (CO2) 01/25/2023 19 (L)      Anion Gap 01/25/2023 15      Urea Nitrogen 01/25/2023 22.2      Creatinine 01/25/2023 1.10      Calcium 01/25/2023 9.7      Glucose 01/25/2023 124 (H)      Alkaline Phosphatase 01/25/2023 68      AST 01/25/2023 28      ALT 01/25/2023 22      Protein Total 01/25/2023 7.2      Albumin 01/25/2023 4.7      Bilirubin Total 01/25/2023 0.5      GFR Estimate 01/25/2023 67      WBC Count 01/25/2023 10.9      RBC Count 01/25/2023 5.01      Hemoglobin 01/25/2023 15.5      Hematocrit 01/25/2023 46.1      MCV 01/25/2023 92      MCH 01/25/2023 30.9      MCHC 01/25/2023 33.6      RDW 01/25/2023 13.4      Platelet Count 01/25/2023 161      Hemoglobin A1C 01/25/2023 5.9 (H)    Hospital Outpatient Visit on 12/21/2022   Component Date Value     LVEF  12/21/2022 60-65%    Lab on 12/12/2022   Component Date Value     Keppra (Levetiracetam) L* 12/12/2022 19.7    Admission on 11/10/2022, Discharged on 11/10/2022   Component Date Value     Sodium 11/10/2022 137      Potassium 11/10/2022 4.4      Chloride 11/10/2022 105      Carbon Dioxide (CO2) 11/10/2022 23      Anion Gap 11/10/2022 9      Urea Nitrogen 11/10/2022 18      Creatinine 11/10/2022 1.15      Calcium 11/10/2022 9.2      Glucose 11/10/2022 124      GFR Estimate 11/10/2022 64      INR 11/10/2022 1.05      aPTT 11/10/2022 30      Troponin I 11/10/2022 0.03      Ventricular Rate 11/10/2022 80      Atrial Rate 11/10/2022 80      NJ Interval 11/10/2022 242      QRS Duration 11/10/2022 82      QT 11/10/2022 376      QTc 11/10/2022 433      P Axis 11/10/2022 44      R AXIS 11/10/2022 -28      T Axis  11/10/2022 27      Interpretation ECG 11/10/2022                      Value:Sinus rhythm with 1st degree A-V block  Anterior infarct , age undetermined  Abnormal ECG  When compared with ECG of 06-FEB-2020 14:46,  Anterior infarct is now Present  Confirmed by SEE ED PROVIDER NOTE FOR, ECG INTERPRETATION ([id number]),  Hubert Deng ([id number]) on 11/10/2022 8:35:58 PM       WBC Count 11/10/2022 5.3      RBC Count 11/10/2022 4.92      Hemoglobin 11/10/2022 15.0      Hematocrit 11/10/2022 44.6      MCV 11/10/2022 91      MCH 11/10/2022 30.5      MCHC 11/10/2022 33.6      RDW 11/10/2022 13.9      Platelet Count 11/10/2022 165      % Neutrophils 11/10/2022 90      % Lymphocytes 11/10/2022 8      % Monocytes 11/10/2022 2      % Eosinophils 11/10/2022 0      % Basophils 11/10/2022 0      % Immature Granulocytes 11/10/2022 0      NRBCs per 100 WBC 11/10/2022 0      Absolute Neutrophils 11/10/2022 4.7      Absolute Lymphocytes 11/10/2022 0.4 (L)      Absolute Monocytes 11/10/2022 0.1      Absolute Eosinophils 11/10/2022 0.0      Absolute Basophils 11/10/2022 0.0      Absolute Immature Granul* 11/10/2022 0.0      Absolute NRBCs 11/10/2022 0.0      GLUCOSE BY METER POCT 11/10/2022 126 (H)          Total time spent for face to face visit, reviewing labs/imaging studies, counseling and coordination of care was: 30 Minutes spent on the date of the encounter doing chart review, review of outside records, review of test results, interpretation of tests, patient visit and documentation       This note was dictated using voice recognition software.  Any grammatical or context distortions are unintentional and inherent to the software.    No orders of the defined types were placed in this encounter.     New Prescriptions    No medications on file     Modified Medications    No medications on file

## 2023-02-01 NOTE — NURSING NOTE
Chief Complaint   Patient presents with     Seizures     Follow up     Nichole Rae on 2/1/2023 at 9:14 AM

## 2023-02-01 NOTE — LETTER
2023         RE: Michael Maldonado   [address]  [address] [place] [address]        Dear Colleague,    Thank you for referring your patient, Michael Maldonado, to the [facility] [place]. Please see a copy of my visit note below.    NEUROLOGY FOLLOW UP VISIT  NOTE       [facility] [place]  [address] [place] [address]  Tel: [phone]  Fax: [phone]  [email or web address]     Michael Maldonado,  1940, MRN [id number]  PCP: Malvin Caballero  Date: 2023      ASSESSMENT & PLAN     Visit Diagnosis  1. Partial symptomatic epilepsy with complex partial seizures, not intractable, without status epilepticus (H)  2. Cerebral arteriovenous malformation     Complex partial seizures  82-year-old male with history of HTN, HLD, alcohol abuse, hypothyroidism, cerebral AV malformation s/p craniotomy , parotid cancer who was initially seen on 11/15/2022 after an episode of confusion.  He had a work-up for TIA that was normal but EEG showed a left parasagittal sharp discharges likely due to glial scar as a result of his craniotomy.  He was started on Keppra and that tolerated well and has not experienced any recurrent symptoms.  He had a Keppra level checked that was normal.  I have recommended:    1.  Continue Keppra 500 mg twice daily.  I have told him he will need Keppra indefinitely  2.  Recently Keppra level was checked and was therapeutic  3.  Patient had his episode on 11/10/2022 and if he remains symptom-free he should be able to start driving to 10/20/2023  4.I reviewed with the patient in detail [place] regulations on seizures and driving. Patient appeared to clearly understand that they prohibited from operating a motor vehicle within 3 months following any seizure or other episode with sudden unconsciousness and that they are required to report any future such seizure to the [facility] within 30 days after the event. I also recommended  that patient and family review all other activities, and avoid any activities that might lead to self-injury or injury of others.  Such activities include but are not limited to holding babies or young children at heights from which they might be injured if dropped, bathing infants or young children in situations in which they might drown without continuous interactive care by an adult who is fully capable at all times during the bath, operating power cutting or other tools, handling firearms, exposure to heights from which she might fall, exposure to vessels with hot cooking oil or water, and swimming alone.   5.  Follow-up will be in 1 year    Thank you again for this referral, please feel free to contact me if you have any questions.    Carlitos Finch MD  [facility]  (Formerly, [facility]     HISTORY OF PRESENT ILLNESS     Patient is a 82-year-old male with history of HTN, HLD, alcohol abuse, hypothyroidism, cerebral AV malformation s/p craniotomy in 1970, parotid gland cancer who was a initially seen on 11/15/2022 after an evaluation in the ER for an episode of confusion. According to wife he had acute onset when he was asking questions repetitively initially when he presented to the emergency room he had a CT of the head and CTA that was unremarkable aside from some chronic changes.  Although admission was recommended he wanted to go home.  He was started on aspirin and simvastatin and since last visit had an EEG that was abnormal showing left parasagittal sharp activity suggesting a low threshold for focal seizure.  30-day event monitor and echocardiogram were normal.  He was started on Keppra and Keppra level was checked that was therapeutic and was told to continue on current dose.  He has not experienced any side effects due to Keppra and also has not experienced any further episodes of confusion     PROBLEM LIST   Patient Active Problem List    Diagnosis Code     Hyperlipidemia E78.5     Hypertension I10     Leukopenia D72.819     Hypothyroidism due to acquired atrophy of thyroid E03.4     Status post total knee replacement, right Z96.651     History of head and neck cancer Z85.89     Cancer of parotid gland (H) C07     Cerebral arteriovenous malformation Q28.2     Hypothyroidism E03.9     Partial complex seizure disorder without intractable epilepsy (H) G40.209     Carotid atherosclerosis, bilateral I65.23     Atherosclerosis of vertebral artery I67.2         PAST MEDICAL & SURGICAL HISTORY     Past Medical History:   Patient  has a past medical history of BPH (benign prostatic hyperplasia), Head and neck cancer (H) (4/12/2016), History of deep vein thrombophlebitis of lower extremity (11/9/2018), Hyperlipidemia, Hypertension, Leukopenia, Osteoarthritis, and Stroke (H).    Surgical History:  He  has a past surgical history that includes Craniotomy For Avm (1970); Total Knee Arthroplasty (Left, 2013); Salivary gland surgery (Right, 2011); joint replacement; and TOTAL KNEE ARTHROPLASTY (Right, 1/7/2019).     SOCIAL HISTORY     Reviewed, and he  reports that he quit smoking about 23 years ago. His smoking use included cigars. He has never used smokeless tobacco. He reports current alcohol use of about 1.0 standard drink per week. He reports that he does not use drugs.     FAMILY HISTORY     Reviewed, and family history includes Cirrhosis in his father; Diabetes in his father.     ALLERGIES     No Known Allergies      REVIEW OF SYSTEMS     A 12 point review of system was performed and was negative except as outlined in the history of present illness.     HOME MEDICATIONS     Current Outpatient Rx   Medication Sig Dispense Refill     amLODIPine (NORVASC) 5 MG tablet TAKE 1 TABLET BY MOUTH EVERY DAY 90 tablet 3     aspirin (ASA) 81 MG EC tablet Take 1 tablet by mouth every other day       levETIRAcetam (KEPPRA) 500 MG tablet Take 1 tablet (500 mg) by mouth 2  "times daily 60 tablet 11     levothyroxine (SYNTHROID/LEVOTHROID) 88 MCG tablet Take 1 tablet (88 mcg) by mouth daily 90 tablet 3     losartan (COZAAR) 50 MG tablet Take 1 tablet (50 mg) by mouth daily 90 tablet 3     omeprazole (PRILOSEC) 20 MG DR capsule TAKE 1 CAPSULE (20 MG TOTAL) BY MOUTH 2 (TWO) TIMES A DAY BEFORE MEALS. Strength: 20 mg 180 capsule 2     simvastatin (ZOCOR) 80 MG tablet Take 0.5 tablets (40 mg) by mouth daily 45 tablet 3         PHYSICAL EXAM     Vital signs  /84   Pulse 65   Ht 1.727 m (5' 8\")   Wt 90.7 kg (200 lb)   BMI 30.41 kg/m      Weight:   200 lbs 0 oz    Elderly gentleman who is alert and oriented vital signs were reviewed and documented in electronic medical record.  Neck was supple.  He has facial deformity due to previous surgery cranial nerves are intact except for sensorineural hearing loss.  He has normal mass and tone with 5/5 strength reflexes 1+ toes equivocal.  He has a wide-based gait and has trouble tandem walking.  Sensation intact with no double simultaneous extinction     PERTINENT DIAGNOSTIC STUDIES     Following studies were reviewed:     CTA HEAD & NECK 11/10/2022  HEAD CT:  1.  No finding for intracranial hemorrhage or mass or convincing finding for acute infarct.     2.  Postsurgical changes relating to prior left parietal craniotomy. Broad area of encephalomalacic change gliosis is seen within the subjacent left frontal lobe and surgical clips are seen in the region of the pineal gland.     3.  There is marked dilatation of the atrium of the left lateral ventricle with an apparent arachnoid cyst bowing the septum pellucidum to the right. There is prominent enlargement of the left temporal horn and occipital horn without enlargement of the   left frontal horn suggesting postobstructive dilatation of the temporal and occipital horns.     4.  If prior studies can be provided for comparison, a comparison will be made and an addendum issued. If no prior " studies are available for comparison, consider correlation with MRI for further assessment.     5.  Mild volume loss and presumed sequela chronic microvascular ischemic change.     HEAD CTA:   1. No finding for vascular cutoff, aneurysm, or flow-limiting stenosis.  2. Coarse atherosclerotic plaque is seen within both carotid siphons without significant associated luminal stenosis.     NECK CTA:  1.  Moderate atherosclerotic plaque right carotid bulb/bifurcation with mild associated luminal narrowing.     2.  Coarse atherosclerotic plaque is seen within the proximal right vertebral artery with more mild atherosclerotic plaque on the left. There is moderate or high-grade narrowing of the proximal right vertebral artery which, where visualized, is similar   to the 1/22/2019 CTA chest.    EEG 11/23/2022  This is an abnormal EEG due to asymmetric slowing of the background that is maximally expressed on the left hemisphere. Asymmetry of the amplitude likely is due to breach effect. Additionally polyspike discharges are noted in the left parasagittal head region with phase reversal at C3 that suggest a low threshold for seizures.     30-DAY EVENT MONITOR 12/28/2022  Cardiac event monitoring from 12/28/2022 to 1/26/2023 (monitored duration 16d 19h 17m).  Baseline rhythm was sinus rhythm.    Reported heart rate range 50 to 120bpm, average 71bpm.  No symptom triggers.  Seven automated recordings included sinus rhythm with 1st degree AV delay and rare PACs and PVCs.  No sustained tachyarrhythmias.  No atrial fibrillation.  There were no pauses noted.  Supraventricular and ventricular ectopic beat frequency are not reported on this monitoring modality.      ECHOCARDIOGRAM 12/21/2022  The left ventricle is normal in size with mild concentric left ventricular  hypertrophy.  Left ventricular function is normal.The ejection fraction is 60-65%.  Normal right ventricle size and systolic function.  A contrast injection (Bubble Study)  was performed that was negative for flow  across the interatrial septum.  Mild age-related valve disease is present without significant stenosis or  regurgitation.  There is no comparison study available.       PERTINENT LABS  Following labs were reviewed:  Office Visit on 01/25/2023   Component Date Value     TSH 01/25/2023 1.56      Cholesterol 01/25/2023 178      Triglycerides 01/25/2023 63      Direct Measure HDL 01/25/2023 63      LDL Cholesterol Calculat* 01/25/2023 102 (H)      Non HDL Cholesterol 01/25/2023 115      Sodium 01/25/2023 141      Potassium 01/25/2023 4.4      Chloride 01/25/2023 107      Carbon Dioxide (CO2) 01/25/2023 19 (L)      Anion Gap 01/25/2023 15      Urea Nitrogen 01/25/2023 22.2      Creatinine 01/25/2023 1.10      Calcium 01/25/2023 9.7      Glucose 01/25/2023 124 (H)      Alkaline Phosphatase 01/25/2023 68      AST 01/25/2023 28      ALT 01/25/2023 22      Protein Total 01/25/2023 7.2      Albumin 01/25/2023 4.7      Bilirubin Total 01/25/2023 0.5      GFR Estimate 01/25/2023 67      WBC Count 01/25/2023 10.9      RBC Count 01/25/2023 5.01      Hemoglobin 01/25/2023 15.5      Hematocrit 01/25/2023 46.1      MCV 01/25/2023 92      MCH 01/25/2023 30.9      MCHC 01/25/2023 33.6      RDW 01/25/2023 13.4      Platelet Count 01/25/2023 161      Hemoglobin A1C 01/25/2023 5.9 (H)    Hospital Outpatient Visit on 12/21/2022   Component Date Value     LVEF  12/21/2022 60-65%    Lab on 12/12/2022   Component Date Value     Keppra (Levetiracetam) L* 12/12/2022 19.7    Admission on 11/10/2022, Discharged on 11/10/2022   Component Date Value     Sodium 11/10/2022 137      Potassium 11/10/2022 4.4      Chloride 11/10/2022 105      Carbon Dioxide (CO2) 11/10/2022 23      Anion Gap 11/10/2022 9      Urea Nitrogen 11/10/2022 18      Creatinine 11/10/2022 1.15      Calcium 11/10/2022 9.2      Glucose 11/10/2022 124      GFR Estimate 11/10/2022 64      INR 11/10/2022 1.05      aPTT 11/10/2022 30       Troponin I 11/10/2022 0.03      Ventricular Rate 11/10/2022 80      Atrial Rate 11/10/2022 80      KS Interval 11/10/2022 242      QRS Duration 11/10/2022 82      QT 11/10/2022 376      QTc 11/10/2022 433      P Axis 11/10/2022 44      R AXIS 11/10/2022 -28      T Axis 11/10/2022 27      Interpretation ECG 11/10/2022                      Value:Sinus rhythm with 1st degree A-V block  Anterior infarct , age undetermined  Abnormal ECG  When compared with ECG of 06-FEB-2020 14:46,  Anterior infarct is now Present  Confirmed by SEE ED PROVIDER NOTE FOR, ECG INTERPRETATION ([id number]),  Hubert Deng ([id number]) on 11/10/2022 8:35:58 PM       WBC Count 11/10/2022 5.3      RBC Count 11/10/2022 4.92      Hemoglobin 11/10/2022 15.0      Hematocrit 11/10/2022 44.6      MCV 11/10/2022 91      MCH 11/10/2022 30.5      MCHC 11/10/2022 33.6      RDW 11/10/2022 13.9      Platelet Count 11/10/2022 165      % Neutrophils 11/10/2022 90      % Lymphocytes 11/10/2022 8      % Monocytes 11/10/2022 2      % Eosinophils 11/10/2022 0      % Basophils 11/10/2022 0      % Immature Granulocytes 11/10/2022 0      NRBCs per 100 WBC 11/10/2022 0      Absolute Neutrophils 11/10/2022 4.7      Absolute Lymphocytes 11/10/2022 0.4 (L)      Absolute Monocytes 11/10/2022 0.1      Absolute Eosinophils 11/10/2022 0.0      Absolute Basophils 11/10/2022 0.0      Absolute Immature Granul* 11/10/2022 0.0      Absolute NRBCs 11/10/2022 0.0      GLUCOSE BY METER POCT 11/10/2022 126 (H)          Total time spent for face to face visit, reviewing labs/imaging studies, counseling and coordination of care was: 30 Minutes spent on the date of the encounter doing chart review, review of outside records, review of test results, interpretation of tests, patient visit and documentation       This note was dictated using voice recognition software.  Any grammatical or context distortions are unintentional and inherent to the software.    No orders of the defined types were  placed in this encounter.     New Prescriptions    No medications on file     Modified Medications    No medications on file                     Again, thank you for allowing me to participate in the care of your patient.        Sincerely,        Carlitos Finch MD

## 2023-02-09 DIAGNOSIS — E03.4 HYPOTHYROIDISM DUE TO ACQUIRED ATROPHY OF THYROID: ICD-10-CM

## 2023-02-09 RX ORDER — LEVOTHYROXINE SODIUM 88 UG/1
TABLET ORAL
Qty: 90 TABLET | Refills: 3 | Status: SHIPPED | OUTPATIENT
Start: 2023-02-09 | End: 2024-01-09

## 2023-02-10 NOTE — TELEPHONE ENCOUNTER
"Last Written Prescription Date: 1/24/2022  Last Fill Quantity: 90,  # refills: 3  Last office visit provider:1/25/2023 with PCP Dr VALENCIA Caballero     Requested Prescriptions   Pending Prescriptions Disp Refills     levothyroxine (SYNTHROID/LEVOTHROID) 88 MCG tablet [Pharmacy Med Name: LEVOTHYROXINE 88 MCG TABLET] 90 tablet 3     Sig: TAKE 1 TABLET BY MOUTH EVERY DAY**REMIND PATIENT TO READ MYCHART PER TIM       Thyroid Protocol Passed - 2/9/2023  9:47 PM        Passed - Patient is 12 years or older        Passed - Recent (12 mo) or future (30 days) visit within the authorizing provider's specialty     Patient has had an office visit with the authorizing provider or a provider within the authorizing providers department within the previous 12 mos or has a future within next 30 days. See \"Patient Info\" tab in inbasket, or \"Choose Columns\" in Meds & Orders section of the refill encounter.              Passed - Medication is active on med list        Passed - Normal TSH on file in past 12 months     Recent Labs   Lab Test 01/25/23  1012   TSH 1.56                   Glendy Earl RN 02/09/23 9:47 PM  "

## 2023-03-03 ENCOUNTER — TRANSFERRED RECORDS (OUTPATIENT)
Dept: INTERNAL MEDICINE | Facility: CLINIC | Age: 83
End: 2023-03-03

## 2023-04-23 ENCOUNTER — OFFICE VISIT (OUTPATIENT)
Dept: FAMILY MEDICINE | Facility: CLINIC | Age: 83
End: 2023-04-23
Payer: COMMERCIAL

## 2023-04-23 VITALS
TEMPERATURE: 97.4 F | SYSTOLIC BLOOD PRESSURE: 133 MMHG | DIASTOLIC BLOOD PRESSURE: 74 MMHG | HEART RATE: 84 BPM | OXYGEN SATURATION: 97 % | RESPIRATION RATE: 18 BRPM

## 2023-04-23 DIAGNOSIS — J06.9 VIRAL URI: Primary | ICD-10-CM

## 2023-04-23 PROCEDURE — 99212 OFFICE O/P EST SF 10 MIN: CPT | Performed by: FAMILY MEDICINE

## 2023-04-23 NOTE — PROGRESS NOTES
Assessment:       Viral URI         Plan:     Symptoms consistent with a viral upper respiratory infection.  Discussed the typical course of symptoms.  No antibiotics indicated at this time.  Recommend voice rest for hoarseness.  Recommend symptomatic treatment at home.  Recommend follow up if getting worse or not improving.        Subjective:       82 year old male presents for evaluation of a 3-day history of sore throat, nasal congestion, and cough.  Yesterday his voice was very hoarse but this has improved.  Denies ear pain, fevers, chills, shortness of breath, or wheezing.    Patient Active Problem List   Diagnosis     Hyperlipidemia     Hypertension     Leukopenia     Hypothyroidism due to acquired atrophy of thyroid     Status post total knee replacement, right     History of head and neck cancer     Cancer of parotid gland (H)     Cerebral arteriovenous malformation     Hypothyroidism     Partial complex seizure disorder without intractable epilepsy (H)     Carotid atherosclerosis, bilateral     Atherosclerosis of vertebral artery       Past Medical History:   Diagnosis Date     BPH (benign prostatic hyperplasia)      Head and neck cancer (H) 4/12/2016     History of deep vein thrombophlebitis of lower extremity 11/9/2018     Hyperlipidemia      Hypertension      Leukopenia     mild     Osteoarthritis      Stroke (H)        Past Surgical History:   Procedure Laterality Date     CRANIOTOMY FOR AVM  1970    clipped     JOINT REPLACEMENT       SALIVARY GLAND SURGERY Right 2011    squamous cell cancer grade 3     TOTAL KNEE ARTHROPLASTY Left 2013     Z TOTAL KNEE ARTHROPLASTY Right 1/7/2019    Procedure: RIGHT TOTAL KNEE  ARTHROPLASTY;  Surgeon: Darrel Page DO;  Location: [facility] OR;  Service: Orthopedics       Current Outpatient Medications   Medication     amLODIPine (NORVASC) 5 MG tablet     aspirin (ASA) 81 MG EC tablet     levETIRAcetam (KEPPRA) 500 MG tablet     levothyroxine  (SYNTHROID/LEVOTHROID) 88 MCG tablet     losartan (COZAAR) 50 MG tablet     omeprazole (PRILOSEC) 20 MG DR capsule     simvastatin (ZOCOR) 80 MG tablet     No current facility-administered medications for this visit.       No Known Allergies    Family History   Problem Relation Age of Onset     Cirrhosis Father      Diabetes Father        Social History     Socioeconomic History     Marital status:      Spouse name: None     Number of children: None     Years of education: None     Highest education level: None   Tobacco Use     Smoking status: Former     Types: Cigars     Quit date: 2000     Years since quittin.3     Smokeless tobacco: Never   Substance and Sexual Activity     Alcohol use: Yes     Alcohol/week: 1.0 standard drink of alcohol     Comment: Alcoholic Drinks/day: nightly     Drug use: No   Social History Narrative    , retired [employer]         Review of Systems  Pertinent items are noted in HPI.      Objective:                 General Appearance:    /74   Pulse 84   Temp 97.4  F (36.3  C) (Oral)   Resp 18   SpO2 97%         Alert, pleasant, cooperative, no distress, mildly hoarse voice.   Head:    Normocephalic, without obvious abnormality, atraumatic   Eyes:    Conjunctiva/corneas clear   Ears:    Normal TM's without erythema or bulging. Normal external ear canals, both ears   Nose:   Nares normal, septum midline, mucosa normal, no drainage    or sinus tenderness   Throat:   Lips, mucosa, and tongue normal; teeth and gums normal.  No tonsilar hypertrophy or exudate.   Neck:   Supple, symmetrical, trachea midline, no adenopathy    Lungs:     Clear to auscultation bilaterally without wheezes, rales, or rhonchi, respirations unlabored    Heart:    Regular rate and rhythm, S1 and S2 normal, no murmur, rub or gallop       Extremities:   Extremities normal, atraumatic, no cyanosis or edema   Skin:   Skin color, texture, turgor normal, no rashes or lesions       This note has  been dictated using voice recognition software. Any grammatical or context distortions are unintentional and inherent to the software

## 2023-05-04 ENCOUNTER — TRANSFERRED RECORDS (OUTPATIENT)
Dept: HEALTH INFORMATION MANAGEMENT | Facility: CLINIC | Age: 83
End: 2023-05-04
Payer: COMMERCIAL

## 2023-05-17 ENCOUNTER — HOSPITAL ENCOUNTER (EMERGENCY)
Facility: CLINIC | Age: 83
Discharge: HOME | End: 2023-05-17
Attending: EMERGENCY MEDICINE | Admitting: EMERGENCY MEDICINE
Payer: COMMERCIAL

## 2023-05-17 ENCOUNTER — APPOINTMENT (OUTPATIENT)
Dept: CT IMAGING | Facility: CLINIC | Age: 83
End: 2023-05-17
Attending: EMERGENCY MEDICINE
Payer: COMMERCIAL

## 2023-05-17 VITALS
OXYGEN SATURATION: 99 % | RESPIRATION RATE: 16 BRPM | HEART RATE: 70 BPM | WEIGHT: 195 LBS | DIASTOLIC BLOOD PRESSURE: 77 MMHG | BODY MASS INDEX: 27.92 KG/M2 | SYSTOLIC BLOOD PRESSURE: 162 MMHG | TEMPERATURE: 97.7 F | HEIGHT: 70 IN

## 2023-05-17 DIAGNOSIS — R53.83 OTHER FATIGUE: ICD-10-CM

## 2023-05-17 LAB
ALBUMIN SERPL-MCNC: 4 G/DL (ref 3.5–5)
ALBUMIN UR-MCNC: NEGATIVE MG/DL
ALP SERPL-CCNC: 71 U/L (ref 45–120)
ALT SERPL W P-5'-P-CCNC: 25 U/L (ref 0–45)
ANION GAP SERPL CALCULATED.3IONS-SCNC: 11 MMOL/L (ref 5–18)
APPEARANCE UR: CLEAR
AST SERPL W P-5'-P-CCNC: 23 U/L (ref 0–40)
BASOPHILS # BLD AUTO: 0 10E3/UL (ref 0–0.2)
BASOPHILS NFR BLD AUTO: 1 %
BILIRUB SERPL-MCNC: 0.7 MG/DL (ref 0–1)
BILIRUB UR QL STRIP: NEGATIVE
BUN SERPL-MCNC: 13 MG/DL (ref 8–28)
CALCIUM SERPL-MCNC: 9.5 MG/DL (ref 8.5–10.5)
CHLORIDE BLD-SCNC: 106 MMOL/L (ref 98–107)
CO2 SERPL-SCNC: 24 MMOL/L (ref 22–31)
COLOR UR AUTO: COLORLESS
CREAT SERPL-MCNC: 1.2 MG/DL (ref 0.7–1.3)
EOSINOPHIL # BLD AUTO: 0.1 10E3/UL (ref 0–0.7)
EOSINOPHIL NFR BLD AUTO: 2 %
ERYTHROCYTE [DISTWIDTH] IN BLOOD BY AUTOMATED COUNT: 14 % (ref 10–15)
GFR SERPL CREATININE-BSD FRML MDRD: 61 ML/MIN/1.73M2
GLUCOSE BLD-MCNC: 103 MG/DL (ref 70–125)
GLUCOSE UR STRIP-MCNC: NEGATIVE MG/DL
HCT VFR BLD AUTO: 44.9 % (ref 40–53)
HGB BLD-MCNC: 15 G/DL (ref 13.3–17.7)
HGB UR QL STRIP: NEGATIVE
HOLD SPECIMEN: NORMAL
IMM GRANULOCYTES # BLD: 0 10E3/UL
IMM GRANULOCYTES NFR BLD: 0 %
KETONES UR STRIP-MCNC: NEGATIVE MG/DL
LEUKOCYTE ESTERASE UR QL STRIP: NEGATIVE
LYMPHOCYTES # BLD AUTO: 0.8 10E3/UL (ref 0.8–5.3)
LYMPHOCYTES NFR BLD AUTO: 19 %
MAGNESIUM SERPL-MCNC: 1.9 MG/DL (ref 1.8–2.6)
MCH RBC QN AUTO: 31.3 PG (ref 26.5–33)
MCHC RBC AUTO-ENTMCNC: 33.4 G/DL (ref 31.5–36.5)
MCV RBC AUTO: 94 FL (ref 78–100)
MONOCYTES # BLD AUTO: 0.4 10E3/UL (ref 0–1.3)
MONOCYTES NFR BLD AUTO: 9 %
NEUTROPHILS # BLD AUTO: 2.9 10E3/UL (ref 1.6–8.3)
NEUTROPHILS NFR BLD AUTO: 69 %
NITRATE UR QL: NEGATIVE
NRBC # BLD AUTO: 0 10E3/UL
NRBC BLD AUTO-RTO: 0 /100
PH UR STRIP: 7 [PH] (ref 5–7)
PLATELET # BLD AUTO: 158 10E3/UL (ref 150–450)
POTASSIUM BLD-SCNC: 4.1 MMOL/L (ref 3.5–5)
PROT SERPL-MCNC: 6.9 G/DL (ref 6–8)
RBC # BLD AUTO: 4.79 10E6/UL (ref 4.4–5.9)
RBC URINE: <1 /HPF
SODIUM SERPL-SCNC: 141 MMOL/L (ref 136–145)
SP GR UR STRIP: 1.01 (ref 1–1.03)
SQUAMOUS EPITHELIAL: <1 /HPF
TROPONIN I SERPL-MCNC: 0.04 NG/ML (ref 0–0.29)
UROBILINOGEN UR STRIP-MCNC: <2 MG/DL
WBC # BLD AUTO: 4.1 10E3/UL (ref 4–11)
WBC URINE: 0 /HPF

## 2023-05-17 PROCEDURE — 81001 URINALYSIS AUTO W/SCOPE: CPT | Performed by: EMERGENCY MEDICINE

## 2023-05-17 PROCEDURE — 83735 ASSAY OF MAGNESIUM: CPT | Performed by: EMERGENCY MEDICINE

## 2023-05-17 PROCEDURE — 99285 EMERGENCY DEPT VISIT HI MDM: CPT | Mod: 25

## 2023-05-17 PROCEDURE — 85004 AUTOMATED DIFF WBC COUNT: CPT | Performed by: EMERGENCY MEDICINE

## 2023-05-17 PROCEDURE — 84484 ASSAY OF TROPONIN QUANT: CPT | Performed by: EMERGENCY MEDICINE

## 2023-05-17 PROCEDURE — 80177 DRUG SCRN QUAN LEVETIRACETAM: CPT | Performed by: EMERGENCY MEDICINE

## 2023-05-17 PROCEDURE — 93005 ELECTROCARDIOGRAM TRACING: CPT | Performed by: EMERGENCY MEDICINE

## 2023-05-17 PROCEDURE — 70450 CT HEAD/BRAIN W/O DYE: CPT

## 2023-05-17 PROCEDURE — 80053 COMPREHEN METABOLIC PANEL: CPT | Performed by: EMERGENCY MEDICINE

## 2023-05-17 PROCEDURE — 36415 COLL VENOUS BLD VENIPUNCTURE: CPT | Performed by: EMERGENCY MEDICINE

## 2023-05-17 NOTE — ED TRIAGE NOTES
The patient presents to the ED with onset of confusion today. He reports he was at a store today when he reported he didn't feel well and told his wife he was unable to drive. He has a history of seizures and takes Levetiracetam since March. The patient's wife denies any seizure like activity. The patient has also reportedly been sleeping more than usual lately.

## 2023-05-17 NOTE — ED PROVIDER NOTES
EMERGENCY DEPARTMENT ENCOUNTER     NAME: Michael Maldonado   AGE: 82 year old male   YOB: 1940   MRN: [id number]   EVALUATION DATE & TIME: No admission date for patient encounter.   PCP: Malvin Caballero     Chief Complaint   Patient presents with     Seizures   :    FINAL IMPRESSION       1. Other fatigue           ED COURSE & MEDICAL DECISION MAKING      4:55 PM I met with patient for initial interview and encounter. PPE worn includes surgical mask and exam gloves.   6:49 PM Spoke to Neurologist Dr. Holman  6:52 PM Updated the patient about imaging and lab results. I also updaetd them about what I discussed with Dr. Holman. We discussed plans for discharge.     Pertinent Labs & Imaging studies reviewed. (See chart for details)   82 year old male  presents to the Emergency Department for evaluation of feeling fatigued and confused, then having an episode today when he was driving of acute confusion that led to him pulling over and telling his wife he felt like he could not drive.  On chart review, he had a seizure in November 2022 and was placed on Keppra at that time.  In December his Keppra level was 19.7.  Interestingly, patient thinks he has been on the Keppra since March which actually does not fit with the chart history. Initial Vitals Reviewed. Initial exam notable for generally well-appearing male who has a healed craniotomy scar but is otherwise neurologically intact.  Patient and spouse of concern was about the Keppra causing his fatigue, but I also considered whether this could be a new injury, did labs to look for cardiac causes, arrhythmia, electrolyte Issues, dehydration, anemia, UTI and all are unremarkable here.  I called and discussed the case with neurology, and the working diagnosis is that he may be intolerant to a Keppra level that is at the higher end of the normal range, or it is possible he even had a seizure even though he was awake.  They do not want to discontinue his  Keppra, but they have asked that we send a level which can be interpreted when he follows up in clinic which I have done.  Patient is being encouraged not to drive until he is seen in neurology clinic, and if his level is on the higher end they may consider lowering his dosage at that time.  Patient and his wife are comfortable with this outpatient plan and he is discharged into her care in stable condition.           At the conclusion of the encounter I discussed the results of all of the tests and the disposition. The questions were answered. The patient or family acknowledged understanding and was agreeable with the care plan.         Medical Decision Making    History:    Supplemental history from: Documented in chart, if applicable, spouse    External Record(s) reviewed: Documented in chart, if applicable., keppra levels Dec 2022, clinic visit with Dr Finch of neurology    Work Up:    Chart documentation includes differential considered and any EKGs or imaging independently interpreted by provider, where specified.    In additional to work up documented, I considered the following work up: Documented in chart, if applicable.    External consultation:    Discussion of management with another provider: Documented in chart, if applicable and Neurology    Complicating factors:    Care impacted by chronic illness: Hyperlipidemia and Hypertension    Care affected by social determinants of health: Access to Medical Care    Disposition considerations: Discharge. No recommendations on prescription strength medication(s). I considered admission, but ultimately discharged patient with reassuring labs and imaging..      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     The patient presents to ED with onset of confusion today. He was driving and reported feeling unwell. He pulled over the car and told his wife he was unable to drive. He has a history of seizures and takes Levetiracetam 500 mg twice a day since March. He had no seizure activity such as shaking per wife. The patient has had increase fatigue and been sleeping more. Within the last 2-3 days, he has been sleeping more than usually. Denies any other concerns.     ================================================================    REVIEW OF SYSTEMS       Review of Systems     PAST HISTORY     PAST MEDICAL HISTORY:   Past Medical History:   Diagnosis Date     BPH (benign prostatic hyperplasia)      Head and neck cancer (H) 4/12/2016     History of deep vein thrombophlebitis of lower extremity 11/9/2018     Hyperlipidemia      Hypertension      Leukopenia     mild     Osteoarthritis      Stroke (H)       PAST SURGICAL HISTORY:   Past Surgical History:   Procedure Laterality Date     CRANIOTOMY FOR AVM  1970    clipped     JOINT REPLACEMENT       SALIVARY GLAND SURGERY Right 2011    squamous cell cancer grade 3     TOTAL KNEE ARTHROPLASTY Left 2013     [facility] TOTAL KNEE ARTHROPLASTY Right 1/7/2019    Procedure: RIGHT TOTAL KNEE  ARTHROPLASTY;  Surgeon: Darrel Page DO;  Location: [facility] OR;  Service: Orthopedics      CURRENT MEDICATIONS:   amLODIPine (NORVASC) 5 MG tablet  aspirin (ASA) 81 MG EC tablet  levETIRAcetam (KEPPRA) 500 MG tablet  levothyroxine (SYNTHROID/LEVOTHROID) 88 MCG tablet  losartan (COZAAR) 50 MG tablet  omeprazole (PRILOSEC) 20 MG DR capsule  simvastatin (ZOCOR) 80 MG tablet      ALLERGIES:   No Known Allergies  "  FAMILY HISTORY:   Family History   Problem Relation Age of Onset     Cirrhosis Father      Diabetes Father       SOCIAL HISTORY:   Social History     Socioeconomic History     Marital status:    Tobacco Use     Smoking status: Former     Types: Cigars     Quit date: 2000     Years since quittin.3     Smokeless tobacco: Never   Substance and Sexual Activity     Alcohol use: Yes     Alcohol/week: 1.0 standard drink of alcohol     Comment: Alcoholic Drinks/day: nightly     Drug use: No   Social History Narrative    , retired j[employer]        VITALS  Patient Vitals for the past 24 hrs:   BP Temp Temp src Pulse Resp SpO2 Height Weight   23 1631 (!) 148/72 97.7  F (36.5  C) Oral 74 16 99 % 1.778 m (5' 10\") 88.5 kg (195 lb)        ================================================================    PHYSICAL EXAM     VITAL SIGNS: BP (!) 148/72   Pulse 74   Temp 97.7  F (36.5  C) (Oral)   Resp 16   Ht 1.778 m (5' 10\")   Wt 88.5 kg (195 lb)   SpO2 99%   BMI 27.98 kg/m     Constitutional:  Awake, no acute distress   HENT:  Atraumatic, oropharynx without exudate or erythema, membranes moist. Healed scar on scalp from major brain surgery in .   Lymph:  No adenopathy  Eyes: EOM intact, PERRL, no injection  Neck: Supple  Respiratory:  Clear to auscultation bilaterally, no wheezes or crackles   Cardiovascular:  Regular rate and rhythm, single S1 and S2   GI:  Soft, nontender, nondistended, no rebound or guarding   Musculoskeletal:  Moves all extremities, no lower extremity edema, no deformities    Skin:  Warm, dry  Neurologic:  Alert and oriented x3, no focal deficits noted       ================================================================  LAB       All pertinent labs reviewed and interpreted.   Labs Ordered and Resulted from Time of ED Arrival to Time of ED Departure   ROUTINE UA WITH MICROSCOPIC REFLEX TO CULTURE - Normal       Result Value    Color Urine Colorless      Appearance " Urine Clear      Glucose Urine Negative      Bilirubin Urine Negative      Ketones Urine Negative      Specific Gravity Urine 1.006      Blood Urine Negative      pH Urine 7.0      Protein Albumin Urine Negative      Urobilinogen Urine <2.0      Nitrite Urine Negative      Leukocyte Esterase Urine Negative      RBC Urine <1      WBC Urine 0      Squamous Epithelials Urine <1     COMPREHENSIVE METABOLIC PANEL - Normal    Sodium 141      Potassium 4.1      Chloride 106      Carbon Dioxide (CO2) 24      Anion Gap 11      Urea Nitrogen 13      Creatinine 1.20      Calcium 9.5      Glucose 103      Alkaline Phosphatase 71      AST 23      ALT 25      Protein Total 6.9      Albumin 4.0      Bilirubin Total 0.7      GFR Estimate 61     TROPONIN I - Normal    Troponin I 0.04     MAGNESIUM - Normal    Magnesium 1.9     CBC WITH PLATELETS AND DIFFERENTIAL    WBC Count 4.1      RBC Count 4.79      Hemoglobin 15.0      Hematocrit 44.9      MCV 94      MCH 31.3      MCHC 33.4      RDW 14.0      Platelet Count 158      % Neutrophils 69      % Lymphocytes 19      % Monocytes 9      % Eosinophils 2      % Basophils 1      % Immature Granulocytes 0      NRBCs per 100 WBC 0      Absolute Neutrophils 2.9      Absolute Lymphocytes 0.8      Absolute Monocytes 0.4      Absolute Eosinophils 0.1      Absolute Basophils 0.0      Absolute Immature Granulocytes 0.0      Absolute NRBCs 0.0     AMMONIA   KEPPRA (LEVETIRACETAM) LEVEL        ===============================================================  RADIOLOGY       Reviewed all pertinent imaging. Please see official radiology report.   CT Head w/o Contrast   Final Result   IMPRESSION:   1.  Stable head CT compared with 11/10/2022, without acute intracranial abnormality.   2.  Stable postoperative changes related to prior left parietal craniotomy, with underlying broad encephalomalacia and gliosis and partial resection of the posterior body and splenium of the corpus callosum.   3.  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            ================================================================  EKG       EKG reviewed and interpreted by me shows sinus bradycardia with rate of 56, left axis, first-degree AV block with SC interval 246, QTc 414 with no acute ST or T wave changes since November 2022    I have independently reviewed and interpreted the EKG(s) documented above.     ================================================================  PROCEDURES         I, Rosangela , am serving as a scribe to document services personally performed by Dr. Fan based on my observation and the provider's statements to me. I, Sallie Fan MD attest that Rosangela Capps is acting in a scribe capacity, has observed my performance of the services and has documented them in accordance with my direction.     Sallie Fan M.D.   Emergency Medicine   [facility] EMERGENCY ROOM  [address] [address]  [phone]  Dept: [phone]        Sallie Fan MD  05/17/23 3030

## 2023-05-17 NOTE — DISCHARGE INSTRUCTIONS
As we discussed, all of the labs to look for other types of problems and the CT of the head do not show any new emergencies or show us why you have been feeling confused and fatigued.  We discussed the case with the neurology team, and the suspicion is that it may be that the level of your seizure medicine in your blood is higher than what your body can tolerate.  They would like you to not drive until you are seen and this is all sorted out just in case it is actually a seizure due to safety.  We have sent in a blood level of your seizure medicine and it should be back by the time you see the neurology clinic.  First thing tomorrow, call Dr. Finch's office and tell them that you are to get an appointment as soon as possible with Tayler, the nurse practitioner that she will be able to see patients quicker.    If worsening mental status change, any new neurologic symptoms, you can always come back to the ER for reevaluation.

## 2023-05-18 LAB
ATRIAL RATE - MUSE: 56 BPM
DIASTOLIC BLOOD PRESSURE - MUSE: NORMAL MMHG
INTERPRETATION ECG - MUSE: NORMAL
LEVETIRACETAM SERPL-MCNC: 11.3 ΜG/ML (ref 10–40)
P AXIS - MUSE: 51 DEGREES
PR INTERVAL - MUSE: 246 MS
QRS DURATION - MUSE: 78 MS
QT - MUSE: 430 MS
QTC - MUSE: 414 MS
R AXIS - MUSE: 0 DEGREES
SYSTOLIC BLOOD PRESSURE - MUSE: NORMAL MMHG
T AXIS - MUSE: 23 DEGREES
VENTRICULAR RATE- MUSE: 56 BPM

## 2023-05-18 NOTE — ED NOTES
Primary assessment done by MD - RN discharge pt from [facility] with plan to follow up with Neuro tomorrow.

## 2023-06-06 ENCOUNTER — TRANSFERRED RECORDS (OUTPATIENT)
Dept: HEALTH INFORMATION MANAGEMENT | Facility: CLINIC | Age: 83
End: 2023-06-06
Payer: COMMERCIAL

## 2023-06-15 ENCOUNTER — ANCILLARY PROCEDURE (OUTPATIENT)
Dept: NEUROLOGY | Facility: CLINIC | Age: 83
End: 2023-06-15
Attending: PSYCHIATRY & NEUROLOGY
Payer: COMMERCIAL

## 2023-06-15 DIAGNOSIS — I10 PRIMARY HYPERTENSION: ICD-10-CM

## 2023-06-15 DIAGNOSIS — G40.209 PARTIAL SYMPTOMATIC EPILEPSY WITH COMPLEX PARTIAL SEIZURES, NOT INTRACTABLE, WITHOUT STATUS EPILEPTICUS (H): ICD-10-CM

## 2023-06-15 PROCEDURE — 95816 EEG AWAKE AND DROWSY: CPT | Performed by: PSYCHIATRY & NEUROLOGY

## 2023-06-15 RX ORDER — AMLODIPINE BESYLATE 5 MG/1
TABLET ORAL
Qty: 90 TABLET | Refills: 3 | Status: SHIPPED | OUTPATIENT
Start: 2023-06-15 | End: 2024-07-15

## 2023-06-15 NOTE — TELEPHONE ENCOUNTER
"Routing refill request to provider for review/approval because:  bp out of range    Last Written Prescription Date:  6/8/22  Last Fill Quantity: 90,  # refills: 3   Last office visit provider:  1/25/23     Requested Prescriptions   Pending Prescriptions Disp Refills     amLODIPine (NORVASC) 5 MG tablet [Pharmacy Med Name: AMLODIPINE BESYLATE 5 MG TAB] 90 tablet 3     Sig: TAKE 1 TABLET BY MOUTH EVERY DAY       Calcium Channel Blockers Protocol  Failed - 6/15/2023 12:07 AM        Failed - Blood pressure under 140/90 in past 12 months     BP Readings from Last 3 Encounters:   05/17/23 (!) 162/77   04/23/23 133/74   02/01/23 138/84                 Passed - Recent (12 mo) or future (30 days) visit within the authorizing provider's specialty     Patient has had an office visit with the authorizing provider or a provider within the authorizing providers department within the previous 12 mos or has a future within next 30 days. See \"Patient Info\" tab in inbasket, or \"Choose Columns\" in Meds & Orders section of the refill encounter.              Passed - Medication is active on med list        Passed - Patient is age 18 or older        Passed - Normal serum creatinine on file in past 12 months     Recent Labs   Lab Test 05/17/23  1645   CR 1.20       Ok to refill medication if creatinine is low               Beti Perez RN 06/15/23 2:49 PM  "

## 2023-06-19 NOTE — RESULT ENCOUNTER NOTE
Stiven Rodriguez,   EEG shows nonspecific slowing in the left hemisphere.  Continue current dose of Leodan Finch MD

## 2023-07-10 ENCOUNTER — TRANSFERRED RECORDS (OUTPATIENT)
Dept: HEALTH INFORMATION MANAGEMENT | Facility: CLINIC | Age: 83
End: 2023-07-10
Payer: COMMERCIAL

## 2023-07-24 ENCOUNTER — TRANSFERRED RECORDS (OUTPATIENT)
Dept: HEALTH INFORMATION MANAGEMENT | Facility: CLINIC | Age: 83
End: 2023-07-24
Payer: COMMERCIAL

## 2023-07-25 ENCOUNTER — OFFICE VISIT (OUTPATIENT)
Dept: INTERNAL MEDICINE | Facility: CLINIC | Age: 83
End: 2023-07-25
Payer: COMMERCIAL

## 2023-07-25 VITALS
HEIGHT: 70 IN | SYSTOLIC BLOOD PRESSURE: 120 MMHG | TEMPERATURE: 98.1 F | WEIGHT: 198 LBS | RESPIRATION RATE: 16 BRPM | BODY MASS INDEX: 28.35 KG/M2 | DIASTOLIC BLOOD PRESSURE: 78 MMHG | OXYGEN SATURATION: 96 % | HEART RATE: 67 BPM

## 2023-07-25 DIAGNOSIS — G40.209 PARTIAL SYMPTOMATIC EPILEPSY WITH COMPLEX PARTIAL SEIZURES, NOT INTRACTABLE, WITHOUT STATUS EPILEPTICUS (H): ICD-10-CM

## 2023-07-25 DIAGNOSIS — M47.816 LUMBAR FACET ARTHROPATHY: Primary | ICD-10-CM

## 2023-07-25 PROCEDURE — 99213 OFFICE O/P EST LOW 20 MIN: CPT | Performed by: INTERNAL MEDICINE

## 2023-07-25 RX ORDER — FLUOROURACIL 50 MG/G
CREAM TOPICAL
COMMUNITY
Start: 2023-05-08

## 2023-07-25 ASSESSMENT — PATIENT HEALTH QUESTIONNAIRE - PHQ9
10. IF YOU CHECKED OFF ANY PROBLEMS, HOW DIFFICULT HAVE THESE PROBLEMS MADE IT FOR YOU TO DO YOUR WORK, TAKE CARE OF THINGS AT HOME, OR GET ALONG WITH OTHER PEOPLE: NOT DIFFICULT AT ALL
SUM OF ALL RESPONSES TO PHQ QUESTIONS 1-9: 0
SUM OF ALL RESPONSES TO PHQ QUESTIONS 1-9: 0

## 2023-07-25 NOTE — PATIENT INSTRUCTIONS
Follow-up multiple issues    Lumbar facet arthropathy and back pain, for which Michael is working with Dr. Curry at [facility]  I encouraged Michael to start physical therapy and low back core muscle strengthening exercises even now, while he is going through the work-up for a possible RFA (radiofrequency ablation)    Michael underwent a diagnostic facet nerve injection last week, mid July 2023, and he had a good response.  He is being set up for the definitive medial branch radiofrequency neurotomy (ablation)  Apparently [facility] wants to administer general anesthesia for the ablation procedure.  Once the timing of that is scheduled, then they will probably want Michael to do up preop exam.    I reminded Michael that radiofrequency ablations tend to produce temporary benefit, not permanent.  The nerve root may regenerate after several months.  If Michael decides to undergo the procedure, he needs to continue to do his back core muscle strengthening exercises.    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  Does NOT need to take antibiotics prior to routine dental procedures such as cleanings  Michael has been in the habit of taking amoxicillin prior to dental procedures, because of his artificial joints.  I told Michael that those implants are mature, and according to guidelines from the American Academy of Orthopedic Surgery, he does not need to take antibiotics anymore only for prophylaxis because of artificial joint.    If he needs to be on antibiotics because of the dental procedure itself, for example if he has an abscessed tooth, that is a different matter.  Before routine dental cleanings, no antibiotics are needed.    Partial complex seizure presenting as acute confusional state November 10, 2022, EEG November 23, 2022 was suggestive of seizure activity, no signs of stroke, started on Keppra 500 mg twice a day  Emergency department visit November 10, 2022 where he was diagnosed  with partial complex seizure, and has been started on Keppra, under the supervision of Dr. Finch of [facility] neurology.     Michael is tolerating that medication just fine.  He will be following up with Dr. Finch, and possibly may be able to resume driving, assuming he remains seizure-free.    History of craniotomy for an arteriovenous malformation in 1970, with good long-term recovery     Atherosclerotic deposits in both carotid siphons and right carotid bulb/bifurcation with mild associated luminal narrowing, and plaque in the proximal right vertebral artery, milder atherosclerotic plaque left vertebral artery, by CT angiography November 10, 2022     Michael takes high-dose simvastatin and also baby aspirin, and has good blood pressure control.     He has undergone a very thorough work-up that started in the emergency department on November 10, 2022, with additional testing orchestrated by Dr. Finch     HEAD CTA:   1. No finding for vascular cutoff, aneurysm, or flow-limiting stenosis.  2. Coarse atherosclerotic plaque is seen within both carotid siphons without significant associated luminal stenosis.  NECK CTA:  1.  Moderate atherosclerotic plaque right carotid bulb/bifurcation with mild associated luminal narrowing.  2.  Coarse atherosclerotic plaque is seen within the proximal right vertebral artery with more mild atherosclerotic plaque on the left. There is moderate or high-grade narrowing of the proximal right vertebral artery which, where visualized, is similar to the 1/22/2019 CTA chest.     11- EEG  Impression: This is an abnormal EEG due to asymmetric slowing of the background that is maximally expressed on the left hemisphere.  Asymmetry of the amplitude likely is due to breach effect.  Additionally polyspike discharges are noted in the left parasagittal head region with phase reversal at C3 that suggest a low threshold for seizures.     Classification: Dysrhythmia grade III left parasagittal (C3)                             Dysrhythmia grade II generalized maximum left                            Breach effect     12- echo  The left ventricle is normal in size with mild concentric left ventricular  hypertrophy.  Left ventricular function is normal.The ejection fraction is 60-65%.  Normal right ventricle size and systolic function.  A contrast injection (Bubble Study) was performed that was negative for flow  across the interatrial septum.  Mild age-related valve disease is present without significant stenosis or  regurgitation.  There is no comparison study available.     Hypothyroidism on replacement, on levothyroxine 88 mcg a day   7-  TSH 0.30 - 4.20 uIU/mL 3.31       Left shoulder rotator cuff syndrome, had steroid injection in 2 spots at his left shoulder at [facility] Monday, January 23     Right sided eye surgery to correct strabismus April 15, 2022     Obesity with body mass index of 30.4  has reduced the alcohol consumption, instead of martinis, he will have a small glass of rafael     Wt Readings from Last 5 Encounters:   07/25/23 89.8 kg (198 lb)   05/17/23 88.5 kg (195 lb)   02/01/23 90.7 kg (200 lb)   01/25/23 90.7 kg (200 lb)   11/15/22 92.1 kg (203 lb)     Short-term Memory impairment  History of craniotomy for an arteriovenous malformation in 1970, with good long-term recovery.    Could not recall 3 objects today 1-     Essential hypertension, reasonably well controlled on combination of losartan plus amlodipine.  We will keep his blood pressure medicines the same.   BP Readings from Last 6 Encounters:   07/25/23 120/78   05/17/23 (!) 162/77   04/23/23 133/74   02/01/23 138/84   01/25/23 108/62   11/15/22 (!) 150/85     Hyperlipidemia on high-dose simvastatin, lipids reasonably well controlled  Carotid and vertebral artery atherosclerosis  Cholesterol panel from January 21, 2022 showed good control with total cholesterol 159, triglycerides 96, HDL 55, LDL 85.      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       Actinic keratoses, with lesions on his scalp that need to be examined by dermatology, sees [facility] [place] every 3 months      History of deep venous thrombosis associated with the 2019 knee arthroplasty, no longer needs to be on anticoagulation.       History of likely COVID with cold symptoms, week of January 10, 2022, lasted a few days, then his wife tested positive  COVID-19 Vaccine Bivalent Booster 12+ (Pfizer) 10/03/2022       I recommended that Michael get a COVID booster along with a seasonal flu shot this autumn, approximately September

## 2023-07-25 NOTE — PROGRESS NOTES
Office Visit - Follow Up   Michael Maldonado   83 year old male    Date of Visit: 7/25/2023    Chief Complaint   Patient presents with    Follow Up        -------------------------------------------------------------------------------------------------------------------------  Assessment and Plan    Follow-up multiple issues    Lumbar facet arthropathy and back pain, for which Michael is working with Dr. Curry at [facility]  I encouraged Michael to start physical therapy and low back core muscle strengthening exercises even now, while he is going through the work-up for a possible RFA (radiofrequency ablation)    Michael underwent a diagnostic facet nerve injection last week, mid July 2023, and he had a good response.  He is being set up for the definitive medial branch radiofrequency neurotomy (ablation)  Apparently [facility] wants to administer general anesthesia for the ablation procedure.  Once the timing of that is scheduled, then they will probably want Michael to do up preop exam.    I reminded Michael that radiofrequency ablations tend to produce temporary benefit, not permanent.  The nerve root may regenerate after several months.  If Michael decides to undergo the procedure, he needs to continue to do his back core muscle strengthening exercises.    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  Does NOT need to take antibiotics prior to routine dental procedures such as cleanings  Michael has been in the habit of taking amoxicillin prior to dental procedures, because of his artificial joints.  I told Michael that those implants are mature, and according to guidelines from the American Academy of Orthopedic Surgery, he does not need to take antibiotics anymore only for prophylaxis because of artificial joint.    If he needs to be on antibiotics because of the dental procedure itself, for example if he has an abscessed tooth, that is a different matter.  Before routine dental cleanings,  no antibiotics are needed.    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 Finch of [facility] neurology.     Michael is tolerating that medication just fine.  He will be following up with Dr. Finch, and possibly may be able to resume driving, assuming he remains seizure-free.    Long term issues stable      History of craniotomy for an arteriovenous malformation in 1970, with good long-term recovery    Atherosclerotic deposits in both carotid siphons and right carotid bulb/bifurcation with mild associated luminal narrowing, and plaque in the proximal right vertebral artery, milder atherosclerotic plaque left vertebral artery, by CT angiography November 10, 2022     Michael takes high-dose simvastatin and also baby aspirin, and has good blood pressure control.     He has undergone a very thorough work-up that started in the emergency department on November 10, 2022, with additional testing orchestrated by Dr. Finch     HEAD CTA:   1. No finding for vascular cutoff, aneurysm, or flow-limiting stenosis.  2. Coarse atherosclerotic plaque is seen within both carotid siphons without significant associated luminal stenosis.  NECK CTA:  1.  Moderate atherosclerotic plaque right carotid bulb/bifurcation with mild associated luminal narrowing.  2.  Coarse atherosclerotic plaque is seen within the proximal right vertebral artery with more mild atherosclerotic plaque on the left. There is moderate or high-grade narrowing of the proximal right vertebral artery which, where visualized, is similar to the 1/22/2019 CTA chest.     11- EEG  Impression: This is an abnormal EEG due to asymmetric slowing of the background that is  maximally expressed on the left hemisphere.  Asymmetry of the amplitude likely is due to breach effect.  Additionally polyspike discharges are noted in the left parasagittal head region with phase reversal at C3 that suggest a low threshold for seizures.     Classification: Dysrhythmia grade III left parasagittal (C3)                            Dysrhythmia grade II generalized maximum left                            Breach effect     12- echo  The left ventricle is normal in size with mild concentric left ventricular  hypertrophy.  Left ventricular function is normal.The ejection fraction is 60-65%.  Normal right ventricle size and systolic function.  A contrast injection (Bubble Study) was performed that was negative for flow  across the interatrial septum.  Mild age-related valve disease is present without significant stenosis or  regurgitation.  There is no comparison study available.     Hypothyroidism on replacement, on levothyroxine 88 mcg a day   7-  TSH 0.30 - 4.20 uIU/mL 3.31       Left shoulder rotator cuff syndrome, had steroid injection in 2 spots at his left shoulder at [facility] Monday, January 23     Right sided eye surgery to correct strabismus April 15, 2022     Obesity with body mass index of 30.4  has reduced the alcohol consumption, instead of martinis, he will have a small glass of rafael     Wt Readings from Last 5 Encounters:   07/25/23 89.8 kg (198 lb)   05/17/23 88.5 kg (195 lb)   02/01/23 90.7 kg (200 lb)   01/25/23 90.7 kg (200 lb)   11/15/22 92.1 kg (203 lb)     Short-term Memory impairment  History of craniotomy for an arteriovenous malformation in 1970, with good long-term recovery.    Could not recall 3 objects today 1-     Essential hypertension, reasonably well controlled on combination of losartan plus amlodipine.  We will keep his blood pressure medicines the same.   BP Readings from Last 6 Encounters:   07/25/23 120/78   05/17/23 (!) 162/77   04/23/23  133/74   02/01/23 138/84   01/25/23 108/62   11/15/22 (!) 150/85     Hyperlipidemia on high-dose simvastatin, lipids reasonably well controlled  Carotid and vertebral artery atherosclerosis  Cholesterol panel from January 21, 2022 showed good control with total cholesterol 159, triglycerides 96, HDL 55, LDL 85.     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      Actinic keratoses, with lesions on his scalp that need to be examined by dermatology, sees [facility] [place] every 3 months      History of deep venous thrombosis associated with the 2019 knee arthroplasty, no longer needs to be on anticoagulation.       History of likely COVID with cold symptoms, week of January 10, 2022, lasted a few days, then his wife tested positive  COVID-19 Vaccine Bivalent Booster 12+ (Pfizer) 10/03/2022      I recommended that Michael get a COVID booster along with a seasonal flu shot this autumn, approximately September      --------------------------------------------------------------------------------------------------------------------------  History of Present Illness  This 83 year old old     Follow-up multiple issues    Lumbar facet arthropathy and back pain, for which Michael is working with Dr. Curry at [facility]  I encouraged Michael to start physical therapy and low back core muscle strengthening exercises even now, while he is going through the work-up for a possible RFA (radiofrequency ablation)    Michael underwent a diagnostic facet nerve injection last week, mid July 2023, and he had a good response.  He is being set up for the definitive medial branch radiofrequency neurotomy (ablation)  Apparently [facility] wants to administer general anesthesia for the ablation procedure.  Once the timing of that is scheduled, then they will probably want Michael to do up preop exam.    I reminded Michael that radiofrequency ablations tend to produce temporary benefit, not permanent.  The nerve root may regenerate after several months.  If Michael decides to undergo the procedure, he needs to continue to do his back core muscle strengthening exercises.    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  Does NOT need to take antibiotics prior to routine dental procedures such as cleanings  Michael has been in the habit of taking amoxicillin prior to dental procedures, because of his artificial joints.  I told Michael that those implants are mature, and according to guidelines from the American Academy of Orthopedic Surgery, he does not need to take antibiotics anymore only for prophylaxis because of artificial joint.    If he needs to be on antibiotics because of the dental procedure itself, for example if he has an abscessed tooth, that is a different matter.  Before routine dental cleanings, no antibiotics are needed.    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 Finch of [facility] neurology.     Michael is tolerating that medication just fine.  He will be following up with Dr. Finch, and possibly may be able to resume driving, assuming he remains seizure-free.      Wt Readings from Last 3 Encounters:   07/25/23 89.8 kg (198 lb)   05/17/23 88.5 kg (195 lb)   02/01/23 90.7 kg (200 lb)     BP Readings from Last 3 Encounters:   07/25/23 120/78   05/17/23 (!) 162/77   04/23/23 133/74        ---------------------------------------------------------------------------------------------------------------------------    Medications, Allergies, Social, and Problem List     Current Outpatient Medications   Medication Sig Dispense Refill    amLODIPine (NORVASC) 5 MG tablet TAKE 1 TABLET BY MOUTH EVERY DAY 90 tablet 3    aspirin (ASA) 81 MG EC tablet Take 1 tablet by mouth every other day      fluorouracil (EFUDEX) 5 % external cream APPLY 1 APPLICATION TOPICALLY TO SCALP AND FOREHEAD      levETIRAcetam (KEPPRA) 500 MG tablet Take 1 tablet (500 mg) by mouth 2 times daily 60 tablet 11    levothyroxine (SYNTHROID/LEVOTHROID) 88 MCG tablet TAKE 1 TABLET BY MOUTH EVERY DAY**REMIND PATIENT TO READ MYCHART PER DR* 90 tablet 3    losartan (COZAAR) 50 MG tablet Take 1 tablet (50 mg) by mouth daily 90 tablet 3    omeprazole (PRILOSEC) 20 MG DR capsule TAKE 1 CAPSULE (20 MG TOTAL) BY MOUTH 2 (TWO) TIMES A DAY BEFORE MEALS. Strength: 20 mg 180 capsule 2    simvastatin (ZOCOR) 80 MG tablet Take 0.5 tablets (40 mg) by mouth daily 45 tablet 3     No Known Allergies  Social History     Tobacco Use    Smoking status: Former     Types: Cigars     Quit date: 2000     Years since quittin.5    Smokeless tobacco: Never   Vaping Use    Vaping Use: Never used   Substance Use Topics    Alcohol use: Yes     Alcohol/week: 1.0 standard drink of alcohol     Comment: Alcoholic Drinks/day: nightly    Drug use: No     Patient Active Problem List   Diagnosis    Hyperlipidemia    Hypertension    Leukopenia    Hypothyroidism due to acquired atrophy of thyroid    Status post total knee replacement, right    History of head and neck cancer    Cancer of parotid gland (H)    Cerebral arteriovenous malformation    Hypothyroidism    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       Additional Information   I spent 20 minutes on this encounter, including reviewing interval history since last visit, examining the patient, explaining and counseling the issues enumerated in the Assessment and Plan (patient given a copy).        LUL SHAFFER MD, MD            "

## 2023-08-01 DIAGNOSIS — G40.209 PARTIAL SYMPTOMATIC EPILEPSY WITH COMPLEX PARTIAL SEIZURES, NOT INTRACTABLE, WITHOUT STATUS EPILEPTICUS (H): ICD-10-CM

## 2023-08-01 RX ORDER — LEVETIRACETAM 500 MG/1
TABLET ORAL
Qty: 180 TABLET | Refills: 1 | Status: SHIPPED | OUTPATIENT
Start: 2023-08-01 | End: 2024-01-03

## 2023-08-01 NOTE — TELEPHONE ENCOUNTER
Refill request for: levetiracetam 500mg   Directions: Take 1 tablet (500 mg) by mouth 2 times daily     LOV: 02/01/23  NOV: 02/05/24    90 day supply with 1 refills Medication T'd for review and signature    Kerri Lambert LPN on 8/1/2023 at 3:38 PM

## 2023-08-21 ENCOUNTER — TRANSFERRED RECORDS (OUTPATIENT)
Dept: HEALTH INFORMATION MANAGEMENT | Facility: CLINIC | Age: 83
End: 2023-08-21
Payer: COMMERCIAL

## 2023-10-11 DIAGNOSIS — E78.2 MIXED HYPERLIPIDEMIA: ICD-10-CM

## 2023-10-11 RX ORDER — SIMVASTATIN 80 MG
40 TABLET ORAL DAILY
Qty: 45 TABLET | Refills: 3 | OUTPATIENT
Start: 2023-10-11

## 2023-10-11 NOTE — TELEPHONE ENCOUNTER
"\" No, pt was told by [facility] that PCP needed to send in another refill before pt could get more medication.  \"  "

## 2023-10-11 NOTE — TELEPHONE ENCOUNTER
Medication Question or Refill    Contacts         Type Contact Phone/Fax    10/11/2023 10:51 AM CDT Interface (Incoming) [id number] - [place], [place] - [address] [phone]            What medication are you calling about (include dose and sig)?:   simvastatin (ZOCOR) 80 MG tablet       Preferred Pharmacy:   [id number] - [place], [place] - [address]  [address] [place] [address]  Phone: [phone] Fax: [phone]        Controlled Substance Agreement on file:   CSA -- Patient Level:    CSA: None found at the patient level.       Who prescribed the medication?:   Malvin Caballero MD       Do you need a refill? Yes    Patient offered an appointment? No    Do you have any questions or concerns?  No, pt was told by [facility] that PCP needed to send in another refill before pt could get more medication.      Could we [facility] this information to you in [facility] or would you prefer to receive a phone call?:   Patient would prefer a phone call   Okay to leave a detailed message?: Yes at [phone]

## 2023-10-12 RX ORDER — SIMVASTATIN 80 MG
40 TABLET ORAL DAILY
Qty: 45 TABLET | Refills: 3 | Status: SHIPPED | OUTPATIENT
Start: 2023-10-12

## 2023-11-09 ENCOUNTER — IMMUNIZATION (OUTPATIENT)
Dept: NURSING | Facility: CLINIC | Age: 83
End: 2023-11-09
Payer: COMMERCIAL

## 2023-11-09 PROCEDURE — 90480 ADMN SARSCOV2 VAC 1/ONLY CMP: CPT

## 2023-11-09 PROCEDURE — 91320 SARSCV2 VAC 30MCG TRS-SUC IM: CPT

## 2023-11-09 PROCEDURE — 90662 IIV NO PRSV INCREASED AG IM: CPT

## 2023-11-09 PROCEDURE — G0008 ADMIN INFLUENZA VIRUS VAC: HCPCS

## 2024-01-02 ENCOUNTER — TRANSFERRED RECORDS (OUTPATIENT)
Dept: HEALTH INFORMATION MANAGEMENT | Facility: CLINIC | Age: 84
End: 2024-01-02
Payer: COMMERCIAL

## 2024-01-03 DIAGNOSIS — G40.209 PARTIAL SYMPTOMATIC EPILEPSY WITH COMPLEX PARTIAL SEIZURES, NOT INTRACTABLE, WITHOUT STATUS EPILEPTICUS (H): ICD-10-CM

## 2024-01-03 RX ORDER — LEVETIRACETAM 500 MG/1
TABLET ORAL
Qty: 180 TABLET | Refills: 0 | Status: SHIPPED | OUTPATIENT
Start: 2024-01-03 | End: 2024-02-05

## 2024-01-03 NOTE — TELEPHONE ENCOUNTER
Refill request for: levetiracetam 500mg   Directions: TAKE 1 TABLET BY MOUTH TWICE A DAY     LOV: 02/01/23  NOV: 02/05/24    90 day supply with 0 refills Medication T'd for review and signature    Kerri Lambert LPN on 1/3/2024 at 1:35 PM

## 2024-01-09 DIAGNOSIS — E03.4 HYPOTHYROIDISM DUE TO ACQUIRED ATROPHY OF THYROID: ICD-10-CM

## 2024-01-09 RX ORDER — LEVOTHYROXINE SODIUM 88 UG/1
88 TABLET ORAL DAILY
Qty: 90 TABLET | Refills: 3 | Status: SHIPPED | OUTPATIENT
Start: 2024-01-09

## 2024-01-18 ENCOUNTER — TRANSFERRED RECORDS (OUTPATIENT)
Dept: HEALTH INFORMATION MANAGEMENT | Facility: CLINIC | Age: 84
End: 2024-01-18
Payer: COMMERCIAL

## 2024-01-30 ENCOUNTER — OFFICE VISIT (OUTPATIENT)
Dept: INTERNAL MEDICINE | Facility: CLINIC | Age: 84
End: 2024-01-30
Payer: COMMERCIAL

## 2024-01-30 VITALS
OXYGEN SATURATION: 96 % | WEIGHT: 204 LBS | RESPIRATION RATE: 16 BRPM | BODY MASS INDEX: 29.2 KG/M2 | HEIGHT: 70 IN | DIASTOLIC BLOOD PRESSURE: 74 MMHG | TEMPERATURE: 97.9 F | HEART RATE: 70 BPM | SYSTOLIC BLOOD PRESSURE: 114 MMHG

## 2024-01-30 DIAGNOSIS — G40.209 PARTIAL SYMPTOMATIC EPILEPSY WITH COMPLEX PARTIAL SEIZURES, NOT INTRACTABLE, WITHOUT STATUS EPILEPTICUS (H): ICD-10-CM

## 2024-01-30 DIAGNOSIS — Z23 PNEUMOCOCCAL VACCINATION ADMINISTERED AT CURRENT VISIT: ICD-10-CM

## 2024-01-30 DIAGNOSIS — E78.2 MIXED HYPERLIPIDEMIA: ICD-10-CM

## 2024-01-30 DIAGNOSIS — M47.816 LUMBAR FACET ARTHROPATHY: ICD-10-CM

## 2024-01-30 DIAGNOSIS — E03.4 HYPOTHYROIDISM DUE TO ACQUIRED ATROPHY OF THYROID: ICD-10-CM

## 2024-01-30 DIAGNOSIS — Z00.00 ROUTINE GENERAL MEDICAL EXAMINATION AT A HEALTH CARE FACILITY: Primary | ICD-10-CM

## 2024-01-30 DIAGNOSIS — I10 ESSENTIAL (PRIMARY) HYPERTENSION: ICD-10-CM

## 2024-01-30 LAB
ALBUMIN SERPL BCG-MCNC: 4.6 G/DL (ref 3.5–5.2)
ALP SERPL-CCNC: 75 U/L (ref 40–150)
ALT SERPL W P-5'-P-CCNC: 30 U/L (ref 0–70)
ANION GAP SERPL CALCULATED.3IONS-SCNC: 10 MMOL/L (ref 7–15)
AST SERPL W P-5'-P-CCNC: 31 U/L (ref 0–45)
BILIRUB SERPL-MCNC: 0.8 MG/DL
BUN SERPL-MCNC: 14.1 MG/DL (ref 8–23)
CALCIUM SERPL-MCNC: 9.3 MG/DL (ref 8.8–10.2)
CHLORIDE SERPL-SCNC: 105 MMOL/L (ref 98–107)
CHOLEST SERPL-MCNC: 152 MG/DL
CREAT SERPL-MCNC: 1.18 MG/DL (ref 0.67–1.17)
DEPRECATED HCO3 PLAS-SCNC: 25 MMOL/L (ref 22–29)
EGFRCR SERPLBLD CKD-EPI 2021: 62 ML/MIN/1.73M2
ERYTHROCYTE [DISTWIDTH] IN BLOOD BY AUTOMATED COUNT: 13.7 % (ref 10–15)
FASTING STATUS PATIENT QL REPORTED: NORMAL
GLUCOSE SERPL-MCNC: 99 MG/DL (ref 70–99)
HCT VFR BLD AUTO: 46.4 % (ref 40–53)
HDLC SERPL-MCNC: 53 MG/DL
HGB BLD-MCNC: 15.2 G/DL (ref 13.3–17.7)
LDLC SERPL CALC-MCNC: 77 MG/DL
MCH RBC QN AUTO: 30.2 PG (ref 26.5–33)
MCHC RBC AUTO-ENTMCNC: 32.8 G/DL (ref 31.5–36.5)
MCV RBC AUTO: 92 FL (ref 78–100)
NONHDLC SERPL-MCNC: 99 MG/DL
PLATELET # BLD AUTO: 129 10E3/UL (ref 150–450)
POTASSIUM SERPL-SCNC: 4.2 MMOL/L (ref 3.4–5.3)
PROT SERPL-MCNC: 7.1 G/DL (ref 6.4–8.3)
RBC # BLD AUTO: 5.04 10E6/UL (ref 4.4–5.9)
SODIUM SERPL-SCNC: 140 MMOL/L (ref 135–145)
TRIGL SERPL-MCNC: 110 MG/DL
TSH SERPL DL<=0.005 MIU/L-ACNC: 7.54 UIU/ML (ref 0.3–4.2)
WBC # BLD AUTO: 4 10E3/UL (ref 4–11)

## 2024-01-30 PROCEDURE — G0439 PPPS, SUBSEQ VISIT: HCPCS | Performed by: INTERNAL MEDICINE

## 2024-01-30 PROCEDURE — 36415 COLL VENOUS BLD VENIPUNCTURE: CPT | Performed by: INTERNAL MEDICINE

## 2024-01-30 PROCEDURE — G0009 ADMIN PNEUMOCOCCAL VACCINE: HCPCS | Performed by: INTERNAL MEDICINE

## 2024-01-30 PROCEDURE — 80061 LIPID PANEL: CPT | Performed by: INTERNAL MEDICINE

## 2024-01-30 PROCEDURE — 84443 ASSAY THYROID STIM HORMONE: CPT | Performed by: INTERNAL MEDICINE

## 2024-01-30 PROCEDURE — 99214 OFFICE O/P EST MOD 30 MIN: CPT | Mod: 25 | Performed by: INTERNAL MEDICINE

## 2024-01-30 PROCEDURE — 90677 PCV20 VACCINE IM: CPT | Performed by: INTERNAL MEDICINE

## 2024-01-30 PROCEDURE — 80053 COMPREHEN METABOLIC PANEL: CPT | Performed by: INTERNAL MEDICINE

## 2024-01-30 PROCEDURE — 85027 COMPLETE CBC AUTOMATED: CPT | Performed by: INTERNAL MEDICINE

## 2024-01-30 ASSESSMENT — ENCOUNTER SYMPTOMS
HEMATOCHEZIA: 0
DIZZINESS: 0
CHILLS: 0
DYSURIA: 0
CONSTIPATION: 0
COUGH: 0
SORE THROAT: 0
NAUSEA: 0
ABDOMINAL PAIN: 0
HEADACHES: 0
DIARRHEA: 0
WEAKNESS: 0
JOINT SWELLING: 0
MYALGIAS: 0
ARTHRALGIAS: 0
HEARTBURN: 0
NERVOUS/ANXIOUS: 0
FREQUENCY: 0
PALPITATIONS: 0
PARESTHESIAS: 0
FEVER: 0
SHORTNESS OF BREATH: 0
HEMATURIA: 0
EYE PAIN: 0

## 2024-01-30 ASSESSMENT — ACTIVITIES OF DAILY LIVING (ADL): CURRENT_FUNCTION: NO ASSISTANCE NEEDED

## 2024-01-30 NOTE — PATIENT INSTRUCTIONS
Annual wellness visit-- doing well     Today January 30, 2024 will get routine lab work of comprehensive metabolic panel, blood cell counts, TSH to monitor levothyroxine therapy, and lipid panel.    Actinic keratoses on face and scalp, for which Michael finished up 4 days of topical 5-fluorouracil with calcipotriene, which was prescribed by [facility] [place], as an alternative to Mohs surgery    Lumbar facet arthropathy and back pain- back doing OK as of January 2024  Michael was working with Dr. Curry at [facility]     Michael underwent a diagnostic facet nerve injection last week, mid July 2023, and he had a good response.  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  Does NOT need to take antibiotics prior to routine dental procedures such as cleanings  Michael has been in the habit of taking amoxicillin prior to dental procedures, because of his artificial joints.  I told Michael that those implants are mature, and according to guidelines from the American Academy of Orthopedic Surgery, he does not need to take antibiotics anymore only for prophylaxis because of artificial joint.     If he needs to be on antibiotics because of the dental procedure itself, for example if he has an abscessed tooth, that is a different matter.  Before routine dental cleanings, no antibiotics are needed.     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 Finch of [facility] neurology.     Michael is tolerating that medication just fine.  He will be following up with Dr. Finch, and possibly may be able to  resume driving, assuming he remains seizure-free.     Long term issues stable     History of craniotomy for an arteriovenous malformation in 1970, with good long-term recovery     Atherosclerotic deposits in both carotid siphons and right carotid bulb/bifurcation with mild associated luminal narrowing, and plaque in the proximal right vertebral artery, milder atherosclerotic plaque left vertebral artery, by CT angiography November 10, 2022     Michael takes high-dose simvastatin and also DAILY baby aspirin, and has good blood pressure control.     He has undergone a very thorough work-up that started in the emergency department on November 10, 2022, with additional testing orchestrated by Dr. Finch     HEAD CTA:   1. No finding for vascular cutoff, aneurysm, or flow-limiting stenosis.  2. Coarse atherosclerotic plaque is seen within both carotid siphons without significant associated luminal stenosis.  NECK CTA:  1.  Moderate atherosclerotic plaque right carotid bulb/bifurcation with mild associated luminal narrowing.  2.  Coarse atherosclerotic plaque is seen within the proximal right vertebral artery with more mild atherosclerotic plaque on the left. There is moderate or high-grade narrowing of the proximal right vertebral artery which, where visualized, is similar to the 1/22/2019 CTA chest.     11- EEG  Impression: This is an abnormal EEG due to asymmetric slowing of the background that is maximally expressed on the left hemisphere.  Asymmetry of the amplitude likely is due to breach effect.  Additionally polyspike discharges are noted in the left parasagittal head region with phase reversal at C3 that suggest a low threshold for seizures.     Classification: Dysrhythmia grade III left parasagittal (C3)                            Dysrhythmia grade II generalized maximum left                            Breach effect     12- echo  The left ventricle is normal in size with mild concentric left  ventricular  hypertrophy.  Left ventricular function is normal.The ejection fraction is 60-65%.  Normal right ventricle size and systolic function.  A contrast injection (Bubble Study) was performed that was negative for flow  across the interatrial septum.  Mild age-related valve disease is present without significant stenosis or  regurgitation.  There is no comparison study available.     Hypothyroidism on replacement, on levothyroxine 88 mcg a day   1-  TSH  0.30 - 4.20 uIU/mL 1.56     Left shoulder rotator cuff syndrome, had steroid injection in 2 spots at his left shoulder at [facility] Monday, January 23     Right sided eye surgery to correct strabismus April 15, 2022     Overweight  with body mass index of 29.3  has reduced the alcohol consumption, instead of martinis, he will have a small glass of rafael     Wt Readings from Last 5 Encounters:   01/30/24 92.5 kg (204 lb)   07/25/23 89.8 kg (198 lb)   05/17/23 88.5 kg (195 lb)   02/01/23 90.7 kg (200 lb)   01/25/23 90.7 kg (200 lb)     Short-term Memory impairment  History of craniotomy for an arteriovenous malformation in 1970, with good long-term recovery.    Could not recall 3 objects today 1-     Essential hypertension, reasonably well controlled on combination of losartan plus amlodipine.  We will keep his blood pressure medicines the same.   BP Readings from Last 6 Encounters:   01/30/24 114/74   07/25/23 120/78   05/17/23 (!) 162/77   04/23/23 133/74   02/01/23 138/84   01/25/23 108/62     Hyperlipidemia on high-dose simvastatin, lipids reasonably well controlled  Carotid and vertebral artery atherosclerosis  Cholesterol panel from January 21, 2022 showed good control with total cholesterol 159, triglycerides 96, HDL 55, LDL 85.     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         History of deep venous thrombosis associated with the 2019 knee arthroplasty, no longer needs to be on anticoagulation.       History of likely COVID with cold symptoms, week of January 10, 2022, lasted a few days, then his wife tested positive    Immunization History   Administered Date(s) Administered    COVID-19 12+ (2023-24) (Pfizer) 11/09/2023    COVID-19 Bivalent 12+ (Pfizer) 10/03/2022    COVID-19 MONOVALENT 12+ (Pfizer) 02/16/2021, 03/09/2021, 10/04/2021    COVID-19 Monovalent 12+ (Pfizer 2022) 04/05/2022    Flu, Unspecified 09/21/2015, 10/11/2017    Influenza (High Dose) 3 valent vaccine 1941, 09/27/2016, 09/26/2018, 09/24/2019    Influenza (IIV3) PF 09/01/2010    Influenza Vaccine 65+ (Fluzone HD) 09/09/2020, 09/28/2021, 11/09/2023    Pneumo Conj 13-V (2010&after) 05/20/2015    Pneumococcal 23 valent 01/01/2007, 08/06/2010    TDAP (Adacel,Boostrix) 08/10/2012    Td,adult,historic,unspecified 01/01/2008    Zoster recombinant adjuvanted (SHINGRIX) 01/24/2019, 09/19/2019    Zoster vaccine, live 04/25/2012

## 2024-01-30 NOTE — PROGRESS NOTES
"Preventive Care Visit  [facility]  LUL SHAFFER MD, Internal Medicine  Jan 30, 2024      SUBJECTIVE:   Michael is a 83 year old, presenting for the following:  Wellness Visit (Pt is fasting)        1/30/2024     9:00 AM   Additional Questions   Roomed by Pricila BLUM     Are you in the first 12 months of your Medicare coverage?  No    Healthy Habits:     In general, how would you rate your overall health?  Excellent    Frequency of exercise:  2-3 days/week    Duration of exercise:  Less than 15 minutes    Do you usually eat at least 4 servings of fruit and vegetables a day, include whole grains    & fiber and avoid regularly eating high fat or \"junk\" foods?  Yes    Taking medications regularly:  Yes    Medication side effects:  None    Ability to successfully perform activities of daily living:  No assistance needed    Home Safety:  No safety concerns identified    Hearing Impairment:  No hearing concerns    In the past 6 months, have you been bothered by leaking of urine?  No    In general, how would you rate your overall mental or emotional health?  Good    Additional concerns today:  No      Today's PHQ-2 Score:       1/30/2024     9:08 AM   PHQ-2 ( 1999 Pfizer)   Q1: Little interest or pleasure in doing things 0   Q2: Feeling down, depressed or hopeless 0   PHQ-2 Score 0   Q1: Little interest or pleasure in doing things Not at all   Q2: Feeling down, depressed or hopeless Not at all   PHQ-2 Score 0           Have you ever done Advance Care Planning? (For example, a Health Directive, POLST, or a discussion with a medical provider or your loved ones about your wishes): Yes, advance care planning is on file.       Fall risk  Fallen 2 or more times in the past year?: No  Any fall with injury in the past year?: No    Cognitive Screening   1) Repeat 3 items (Leader, Season, Table)    2) Clock draw: Abnormal wrong time  3) 3 item recall: Recalls NO objects   Results: 0 items recalled: PROBABLE " COGNITIVE IMPAIRMENT, **INFORM PROVIDER**    Mini-CogTM Copyright JOSE Grover. Licensed by the author for use in [facility]; reprinted with permission (matilde@[facility]). All rights reserved.      Do you have sleep apnea, excessive snoring or daytime drowsiness? : no    Reviewed and updated as needed this visit by clinical staff   Tobacco  Allergies  Meds              Reviewed and updated as needed this visit by Provider                  Social History     Tobacco Use    Smoking status: Former     Types: Cigars     Quit date: 2000     Years since quittin.0     Passive exposure: Never    Smokeless tobacco: Never   Substance Use Topics    Alcohol use: Yes     Alcohol/week: 1.0 standard drink of alcohol     Comment: Alcoholic Drinks/day: nightly           2024     9:08 AM   Alcohol Use   Prescreen: >3 drinks/day or >7 drinks/week? No     Do you have a current opioid prescription? No  Do you use any other controlled substances or medications that are not prescribed by a provider? None    Current providers sharing in care for this patient include:   Patient Care Team:  Malvin Caballero MD as PCP - General (Internal Medicine)  Elke Batista, PharmD as Pharmacist (Pharmacist)  Malvin Caballero MD as Assigned PCP  Tayler Chambers APRN CNP as Nurse Practitioner (Neurology)  Carlitos Finch MD as Assigned Neuroscience Provider    The following health maintenance items are reviewed in Epic and correct as of today:  Health Maintenance   Topic Date Due    ANNUAL REVIEW OF  ORDERS  Never done    RSV VACCINE (Pregnancy & 60+) (1 - 1-dose 60+ series) Never done    DTAP/TDAP/TD IMMUNIZATION (2 - Td or Tdap) 08/10/2022    MEDICARE ANNUAL WELLNESS VISIT  2024    TSH W/FREE T4 REFLEX  2024    FALL RISK ASSESSMENT  2025    ADVANCE CARE PLANNING  2028    PHQ-2 (once per calendar year)  Completed    INFLUENZA VACCINE  Completed    Pneumococcal Vaccine: 65+ Years  Completed    ZOSTER  "IMMUNIZATION  Completed    COVID-19 Vaccine  Completed    IPV IMMUNIZATION  Aged Out    HPV IMMUNIZATION  Aged Out    MENINGITIS IMMUNIZATION  Aged Out    RSV MONOCLONAL ANTIBODY  Aged Out       Review of Systems   Constitutional:  Negative for chills and fever.   HENT:  Positive for hearing loss. Negative for congestion, ear pain and sore throat.    Eyes:  Negative for pain and visual disturbance.   Respiratory:  Negative for cough and shortness of breath.    Cardiovascular:  Negative for chest pain and palpitations.   Gastrointestinal:  Negative for abdominal pain, constipation, diarrhea and nausea.   Genitourinary:  Negative for dysuria, frequency, genital sores, hematuria, penile discharge and urgency.   Musculoskeletal:  Negative for arthralgias, joint swelling and myalgias.   Skin:  Negative for rash.   Neurological:  Negative for dizziness, weakness and headaches.   Psychiatric/Behavioral:  The patient is not nervous/anxious.       OBJECTIVE:   /74 (BP Location: Right arm, Patient Position: Sitting)   Pulse 70   Temp 97.9  F (36.6  C)   Resp 16   Ht 1.778 m (5' 10\")   Wt 92.5 kg (204 lb)   SpO2 96%   BMI 29.27 kg/m     Estimated body mass index is 29.27 kg/m  as calculated from the following:    Height as of this encounter: 1.778 m (5' 10\").    Weight as of this encounter: 92.5 kg (204 lb).  Physical Exam    General: Alert, in no distress  All looks just great today.  Normal affect, fluent speech.     Skin: + Actinic changes scalp.  Large seborrheic keratosis on his right upper back  Eyes/nose/throat: Eyes without scleral icterus, eye movements normal, pupils equal and reactive, oropharynx clear,   + Bilateral hearing aids  MSK: Neck with good ROM  + Reduced range of motion both shoulder  Lymphatic: Neck without adenopathy or masses  Endocrine: Thyroid with no nodules to palpation  Pulm: Lungs clear to auscultation bilaterally  Cardiac: Heart with regular rate and rhythm, no murmur or gallop  GI: " Abdomen soft, nontender. No palpable enlargement of liver or spleen  MSK: Extremities no tenderness or edema  Neuro: Moves all extremities, without focal weakness  + Per his baseline, Michael did have difficulty drawing a clock face, and could recall only 0 out of 3 objects on short-term memory testing  Psych: Alert, normal mental status. Normal affect and speech      ASSESSMENT / PLAN:     ADDENDUM:  TSH elevated a little over 7.  Surprising because he has been on a stable dose of levothyroxine.  Will send a [employer] message making sure that he is taking his levothyroxine properly on empty stomach, not missing any doses.  Recheck TSH in 1-2 months.  If again elevated, then adjust his dose.      Annual wellness visit-- doing well     Today January 30, 2024 will get routine lab work of comprehensive metabolic panel, blood cell counts, TSH to monitor levothyroxine therapy, and lipid panel.    Actinic keratoses on face and scalp, for which Michael finished up 4 days of topical 5-fluorouracil with calcipotriene, which was prescribed by [facility] in [place], as an alternative to Mohs surgery    Lumbar facet arthropathy and back pain- back doing OK as of January 2024  Michael was working with Dr. Curry at [facility]     Michael underwent a diagnostic facet nerve injection last week, mid July 2023, and he had a good response.  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  Does NOT need to take antibiotics prior to routine dental procedures such as cleanings  Michael has been in the habit of taking amoxicillin prior to dental procedures, because of his artificial joints.  I told Michael that those implants are mature, and according to guidelines from the American Academy of Orthopedic Surgery, he does not need to take antibiotics anymore only for prophylaxis because of artificial joint.     If he needs  to be on antibiotics because of the dental procedure itself, for example if he has an abscessed tooth, that is a different matter.  Before routine dental cleanings, no antibiotics are needed.     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 Finch of [facility] neurology.     Michael is tolerating that medication just fine.  He will be following up with Dr. Finch, and possibly may be able to resume driving, assuming he remains seizure-free.     Long term issues stable     History of craniotomy for an arteriovenous malformation in 1970, with good long-term recovery     Atherosclerotic deposits in both carotid siphons and right carotid bulb/bifurcation with mild associated luminal narrowing, and plaque in the proximal right vertebral artery, milder atherosclerotic plaque left vertebral artery, by CT angiography November 10, 2022     Michael takes high-dose simvastatin and also DAILY baby aspirin, and has good blood pressure control.     He has undergone a very thorough work-up that started in the emergency department on November 10, 2022, with additional testing orchestrated by Dr. Finch     HEAD CTA:   1. No finding for vascular cutoff, aneurysm, or flow-limiting stenosis.  2. Coarse atherosclerotic plaque is seen within both carotid siphons without significant associated luminal stenosis.  NECK CTA:  1.  Moderate atherosclerotic plaque right carotid bulb/bifurcation with mild associated luminal narrowing.  2.  Coarse atherosclerotic plaque is seen within the proximal right vertebral artery with more mild atherosclerotic plaque on the left. There is moderate or high-grade narrowing of the proximal right vertebral artery  which, where visualized, is similar to the 1/22/2019 CTA chest.     11- EEG  Impression: This is an abnormal EEG due to asymmetric slowing of the background that is maximally expressed on the left hemisphere.  Asymmetry of the amplitude likely is due to breach effect.  Additionally polyspike discharges are noted in the left parasagittal head region with phase reversal at C3 that suggest a low threshold for seizures.     Classification: Dysrhythmia grade III left parasagittal (C3)                            Dysrhythmia grade II generalized maximum left                            Breach effect     12- echo  The left ventricle is normal in size with mild concentric left ventricular  hypertrophy.  Left ventricular function is normal.The ejection fraction is 60-65%.  Normal right ventricle size and systolic function.  A contrast injection (Bubble Study) was performed that was negative for flow  across the interatrial septum.  Mild age-related valve disease is present without significant stenosis or  regurgitation.  There is no comparison study available.     Hypothyroidism on replacement, on levothyroxine 88 mcg a day   1-  TSH  0.30 - 4.20 uIU/mL 1.56     Left shoulder rotator cuff syndrome, had steroid injection in 2 spots at his left shoulder at [facility] Monday, January 23     Right sided eye surgery to correct strabismus April 15, 2022     Overweight  with body mass index of 29.3  has reduced the alcohol consumption, instead of martinis, he will have a small glass of rafael     Wt Readings from Last 5 Encounters:   01/30/24 92.5 kg (204 lb)   07/25/23 89.8 kg (198 lb)   05/17/23 88.5 kg (195 lb)   02/01/23 90.7 kg (200 lb)   01/25/23 90.7 kg (200 lb)     Short-term Memory impairment-- longstanding  History of craniotomy for an arteriovenous malformation in 1970, with good long-term recovery.    Could not recall 3 objects today 1-     Essential hypertension, reasonably well  controlled on combination of losartan plus amlodipine.  We will keep his blood pressure medicines the same.   BP Readings from Last 6 Encounters:   01/30/24 114/74   07/25/23 120/78   05/17/23 (!) 162/77   04/23/23 133/74   02/01/23 138/84   01/25/23 108/62     Hyperlipidemia on high-dose simvastatin, lipids reasonably well controlled  Carotid and vertebral artery atherosclerosis  Cholesterol panel from January 21, 2022 showed good control with total cholesterol 159, triglycerides 96, HDL 55, LDL 85.     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         History of deep venous thrombosis associated with the 2019 knee arthroplasty, no longer needs to be on anticoagulation.       History of likely COVID with cold symptoms, week of January 10, 2022, lasted a few days, then his wife tested positive    Immunization History   Administered Date(s) Administered    COVID-19 12+ (2023-24) (Pfizer) 11/09/2023    COVID-19 Bivalent 12+ (Pfizer) 10/03/2022    COVID-19 MONOVALENT 12+ (Pfizer) 02/16/2021, 03/09/2021, 10/04/2021    COVID-19 Monovalent 12+ (Pfizer 2022) 04/05/2022    Flu, Unspecified 09/21/2015, 10/11/2017    Influenza (High Dose) 3 valent vaccine 1941, 09/27/2016, 09/26/2018, 09/24/2019    Influenza (IIV3) PF 09/01/2010    Influenza Vaccine 65+ (Fluzone HD) 09/09/2020, 09/28/2021, 11/09/2023    Pneumo Conj 13-V (2010&after) 05/20/2015    Pneumococcal 23 valent 01/01/2007, 08/06/2010    TDAP (Adacel,Boostrix) 08/10/2012    Td,adult,historic,unspecified 01/01/2008    Zoster recombinant adjuvanted (SHINGRIX) 01/24/2019, 09/19/2019    Zoster vaccine, live 04/25/2012       Counseling  Reviewed preventive health counseling, as reflected in patient instructions       Healthy diet/nutrition      BMI  Estimated body mass index is 29.27 kg/m  as  "calculated from the following:    Height as of this encounter: 1.778 m (5' 10\").    Weight as of this encounter: 92.5 kg (204 lb).   Weight management plan: Discussed healthy diet and exercise guidelines      He reports that he quit smoking about 24 years ago. His smoking use included cigars. He has never been exposed to tobacco smoke. He has never used smokeless tobacco.      Appropriate preventive services were discussed with this patient, including applicable screening as appropriate for fall prevention, nutrition, physical activity, Tobacco-use cessation, weight loss and cognition.  Checklist reviewing preventive services available has been given to the patient.    Reviewed patients plan of care and provided an AVS. The Basic Care Plan (routine screening as documented in Health Maintenance) for Michael meets the Care Plan requirement. This Care Plan has been established and reviewed with the Patient.        Signed Electronically by: LUL SHAFFER MD    Identified Health Risks  I have reviewed Opioid Use Disorder and Substance Use Disorder risk factors and made any needed referrals.   "

## 2024-02-02 NOTE — PROGRESS NOTES
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  Previous workup included EEG that showed left parasagittal sharp discharge.  He was started on Keppra and has remained symptom-free.  I have recommended:    1.  Continue Keppra 500 mg twice daily  2.  Check electrolyte panel and Keppra levels  3.  Patient was counseled about the diagnosis  4.  Follow-up in 1 year    Thank you again for this referral, please feel free to contact me if you have any questions.    Carlitos Finch MD  [facility] NEUROLOG[facility]  (Formerly, [facility]     HISTORY OF PRESENT ILLNESS     Patient is  a 83-year-old male with history of HTN, HLD, alcohol abuse, hypothyroidism, cerebral AV malformation s/p craniotomy in , parotid cancer who was initially evaluated on 11/15/2022 after an episode of confusion.  Workup for TIA was normal but EEG showed a left parasagittal sharp discharge and he was diagnosed with complex partial seizures and started on Keppra.  He was last seen on 2023 and was continued on Keppra 500 mg twice daily and had a Keppra level checked that was normal.  Since his last visit he denies any new complaints.  His seizures have remained well-controlled.    BRIEFLY patient is a male with history of HTN, HLD, EtOH abuse, cerebral AV malformation s/p craniotomy in , parotid gland cancer who was initially seen on  11/15/2022 after an evaluation in the ER for an episode of confusion. According to wife he had acute onset when he was asking questions repetitively initially when he presented to the emergency room he had a CT of the head and CTA that was unremarkable aside from some chronic changes.  Although admission was recommended he wanted to go home.  He was started on aspirin and simvastatin and subsequently had an EEG that was abnormal showing left parasagittal sharp discharges suggesting low threshold for seizure.  30-day event monitor and echocardiogram were normal.  He was started on Keppra.     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    Surgical History:  He  has a past surgical history that includes Craniotomy For Avm (1970); Total Knee Arthroplasty (Left, 2013); Salivary gland surgery (Right, 2011); joint replacement; and TOTAL KNEE ARTHROPLASTY (Right, 1/7/2019).     SOCIAL HISTORY     Reviewed, and he  reports that he quit smoking about 24 years ago. His smoking use included cigars. He has never been exposed to tobacco smoke. He has never used smokeless tobacco. He reports current alcohol use of  "about 1.0 standard drink of alcohol per week. He reports that he does not use drugs.     FAMILY HISTORY     Reviewed, and family history includes Cirrhosis in his father; Diabetes in his father.     ALLERGIES     No Known Allergies      REVIEW OF SYSTEMS     A 12 point review of system was performed and was negative except as outlined in the history of present illness.     HOME MEDICATIONS     Current Outpatient Rx   Medication Sig Dispense Refill    amLODIPine (NORVASC) 5 MG tablet TAKE 1 TABLET BY MOUTH EVERY DAY 90 tablet 3    aspirin (ASA) 81 MG EC tablet Take 1 tablet by mouth every other day      fluorouracil (EFUDEX) 5 % external cream APPLY 1 APPLICATION TOPICALLY TO SCALP AND FOREHEAD      levETIRAcetam (KEPPRA) 500 MG tablet Take 1 tablet (500 mg) by mouth 2 times daily 180 tablet 3    levothyroxine (SYNTHROID/LEVOTHROID) 88 MCG tablet Take 1 tablet (88 mcg) by mouth daily 90 tablet 3    losartan (COZAAR) 50 MG tablet Take 1 tablet (50 mg) by mouth daily 90 tablet 3    omeprazole (PRILOSEC) 20 MG DR capsule TAKE 1 CAPSULE (20 MG TOTAL) BY MOUTH 2 (TWO) TIMES A DAY BEFORE MEALS. Strength: 20 mg 180 capsule 2    simvastatin (ZOCOR) 80 MG tablet Take 0.5 tablets (40 mg) by mouth daily 45 tablet 3         PHYSICAL EXAM     Vital signs  /67 (BP Location: Right arm, Patient Position: Sitting)   Pulse 81   Ht 1.778 m (5' 10\")   Wt 92.1 kg (203 lb)   BMI 29.13 kg/m      Weight:   203 lbs 0 oz    Elderly gentleman who is alert and oriented vital signs were reviewed and documented in electronic medical record.  Neck was supple.  He has facial deformity due to previous surgery cranial nerves are intact except for sensorineural hearing loss.  He has normal mass and tone with 5/5 strength reflexes 1+ toes equivocal.  He has a wide-based gait and has trouble tandem walking.  Sensation intact with no double simultaneous extinction     PERTINENT DIAGNOSTIC STUDIES     Following studies were reviewed:     CTA " HEAD & NECK 11/10/2022  HEAD CT:  1.  No finding for intracranial hemorrhage or mass or convincing finding for acute infarct.     2.  Postsurgical changes relating to prior left parietal craniotomy. Broad area of encephalomalacic change gliosis is seen within the subjacent left frontal lobe and surgical clips are seen in the region of the pineal gland.     3.  There is marked dilatation of the atrium of the left lateral ventricle with an apparent arachnoid cyst bowing the septum pellucidum to the right. There is prominent enlargement of the left temporal horn and occipital horn without enlargement of the   left frontal horn suggesting postobstructive dilatation of the temporal and occipital horns.     4.  If prior studies can be provided for comparison, a comparison will be made and an addendum issued. If no prior studies are available for comparison, consider correlation with MRI for further assessment.     5.  Mild volume loss and presumed sequela chronic microvascular ischemic change.     HEAD CTA:   1. No finding for vascular cutoff, aneurysm, or flow-limiting stenosis.  2. Coarse atherosclerotic plaque is seen within both carotid siphons without significant associated luminal stenosis.     NECK CTA:  1.  Moderate atherosclerotic plaque right carotid bulb/bifurcation with mild associated luminal narrowing.     2.  Coarse atherosclerotic plaque is seen within the proximal right vertebral artery with more mild atherosclerotic plaque on the left. There is moderate or high-grade narrowing of the proximal right vertebral artery which, where visualized, is similar   to the 1/22/2019 CTA chest.     EEG 11/23/2022  This is an abnormal EEG due to asymmetric slowing of the background that is maximally expressed on the left hemisphere. Asymmetry of the amplitude likely is due to breach effect. Additionally polyspike discharges are noted in the left parasagittal head region with phase reversal at C3 that suggest a low  threshold for seizures.      30-DAY EVENT MONITOR 12/28/2022  Cardiac event monitoring from 12/28/2022 to 1/26/2023 (monitored duration 16d 19h 17m).  Baseline rhythm was sinus rhythm.    Reported heart rate range 50 to 120bpm, average 71bpm.  No symptom triggers.  Seven automated recordings included sinus rhythm with 1st degree AV delay and rare PACs and PVCs.  No sustained tachyarrhythmias.  No atrial fibrillation.  There were no pauses noted.  Supraventricular and ventricular ectopic beat frequency are not reported on this monitoring modality.       ECHOCARDIOGRAM 12/21/2022  The left ventricle is normal in size with mild concentric left ventricular  hypertrophy.  Left ventricular function is normal.The ejection fraction is 60-65%.  Normal right ventricle size and systolic function.  A contrast injection (Bubble Study) was performed that was negative for flow  across the interatrial septum.  Mild age-related valve disease is present without significant stenosis or  regurgitation.  There is no comparison study available.     PERTINENT LABS  Following labs were reviewed:  Office Visit on 01/30/2024   Component Date Value Ref Range Status    Sodium 01/30/2024 140  135 - 145 mmol/L Final    Potassium 01/30/2024 4.2  3.4 - 5.3 mmol/L Final    Carbon Dioxide (CO2) 01/30/2024 25  22 - 29 mmol/L Final    Anion Gap 01/30/2024 10  7 - 15 mmol/L Final    Urea Nitrogen 01/30/2024 14.1  8.0 - 23.0 mg/dL Final    Creatinine 01/30/2024 1.18 (H)  0.67 - 1.17 mg/dL Final    GFR Estimate 01/30/2024 62  >60 mL/min/1.73m2 Final    Calcium 01/30/2024 9.3  8.8 - 10.2 mg/dL Final    Chloride 01/30/2024 105  98 - 107 mmol/L Final    Glucose 01/30/2024 99  70 - 99 mg/dL Final    Alkaline Phosphatase 01/30/2024 75  40 - 150 U/L Final    AST 01/30/2024 31  0 - 45 U/L Final    ALT 01/30/2024 30  0 - 70 U/L Final    Protein Total 01/30/2024 7.1  6.4 - 8.3 g/dL Final    Albumin 01/30/2024 4.6  3.5 - 5.2 g/dL Final    Bilirubin Total  01/30/2024 0.8  <=1.2 mg/dL Final    WBC Count 01/30/2024 4.0  4.0 - 11.0 10e3/uL Final    RBC Count 01/30/2024 5.04  4.40 - 5.90 10e6/uL Final    Hemoglobin 01/30/2024 15.2  13.3 - 17.7 g/dL Final    Hematocrit 01/30/2024 46.4  40.0 - 53.0 % Final    MCV 01/30/2024 92  78 - 100 fL Final    MCH 01/30/2024 30.2  26.5 - 33.0 pg Final    MCHC 01/30/2024 32.8  31.5 - 36.5 g/dL Final    RDW 01/30/2024 13.7  10.0 - 15.0 % Final    Platelet Count 01/30/2024 129 (L)  150 - 450 10e3/uL Final    TSH 01/30/2024 7.54 (H)  0.30 - 4.20 uIU/mL Final    Cholesterol 01/30/2024 152  <200 mg/dL Final    Triglycerides 01/30/2024 110  <150 mg/dL Final    Direct Measure HDL 01/30/2024 53  >=40 mg/dL Final    LDL Cholesterol Calculated 01/30/2024 77  <=100 mg/dL Final    Non HDL Cholesterol 01/30/2024 99  <130 mg/dL Final    Patient Fasting > 8hrs? 01/30/2024 Unknown   Final         Total time spent for face to face visit, reviewing labs/imaging studies, counseling and coordination of care was: 30 Minutes spent on the date of the encounter doing chart review, review of outside records, review of test results, interpretation of tests, patient visit, and documentation   The longitudinal plan of care for complex partial seizures was addressed during this visit. Due to the added complexity in care, I will continue to support Michael in the subsequent management of this condition(s) and with the ongoing continuity of care of this condition(s).  This note was dictated using voice recognition software.  Any grammatical or context distortions are unintentional and inherent to the software.    Orders Placed This Encounter   Procedures    Keppra (Levetiracetam) Level    Electrolyte panel      New Prescriptions    No medications on file     Modified Medications    Modified Medication Previous Medication    LEVETIRACETAM (KEPPRA) 500 MG TABLET levETIRAcetam (KEPPRA) 500 MG tablet       Take 1 tablet (500 mg) by mouth 2 times daily    TAKE 1 TABLET BY  MOUTH TWICE A DAY

## 2024-02-05 ENCOUNTER — OFFICE VISIT (OUTPATIENT)
Dept: NEUROLOGY | Facility: CLINIC | Age: 84
End: 2024-02-05
Attending: PSYCHIATRY & NEUROLOGY
Payer: COMMERCIAL

## 2024-02-05 ENCOUNTER — LAB (OUTPATIENT)
Dept: LAB | Facility: CLINIC | Age: 84
End: 2024-02-05
Payer: COMMERCIAL

## 2024-02-05 VITALS
WEIGHT: 203 LBS | SYSTOLIC BLOOD PRESSURE: 117 MMHG | DIASTOLIC BLOOD PRESSURE: 67 MMHG | HEIGHT: 70 IN | BODY MASS INDEX: 29.06 KG/M2 | HEART RATE: 81 BPM

## 2024-02-05 DIAGNOSIS — G40.209 PARTIAL COMPLEX SEIZURE DISORDER WITHOUT INTRACTABLE EPILEPSY (H): ICD-10-CM

## 2024-02-05 DIAGNOSIS — G40.209 PARTIAL COMPLEX SEIZURE DISORDER WITHOUT INTRACTABLE EPILEPSY (H): Primary | ICD-10-CM

## 2024-02-05 LAB
ANION GAP SERPL CALCULATED.3IONS-SCNC: 10 MMOL/L (ref 7–15)
CHLORIDE SERPL-SCNC: 106 MMOL/L (ref 98–107)
DEPRECATED HCO3 PLAS-SCNC: 27 MMOL/L (ref 22–29)
LEVETIRACETAM SERPL-MCNC: 17.9 ΜG/ML (ref 10–40)
POTASSIUM SERPL-SCNC: 4.9 MMOL/L (ref 3.4–5.3)
SODIUM SERPL-SCNC: 143 MMOL/L (ref 135–145)

## 2024-02-05 PROCEDURE — 80177 DRUG SCRN QUAN LEVETIRACETAM: CPT

## 2024-02-05 PROCEDURE — 82374 ASSAY BLOOD CARBON DIOXIDE: CPT

## 2024-02-05 PROCEDURE — 36415 COLL VENOUS BLD VENIPUNCTURE: CPT

## 2024-02-05 PROCEDURE — 99214 OFFICE O/P EST MOD 30 MIN: CPT | Performed by: PSYCHIATRY & NEUROLOGY

## 2024-02-05 PROCEDURE — G2211 COMPLEX E/M VISIT ADD ON: HCPCS | Performed by: PSYCHIATRY & NEUROLOGY

## 2024-02-05 RX ORDER — LEVETIRACETAM 500 MG/1
500 TABLET ORAL 2 TIMES DAILY
Qty: 180 TABLET | Refills: 3 | Status: SHIPPED | OUTPATIENT
Start: 2024-02-05

## 2024-02-05 NOTE — NURSING NOTE
Chief Complaint   Patient presents with    Complex partial seizures     Annual follow up- no new concerns      Nilda Reina CMA on 2/5/2024 at 9:36 AM  [facility]

## 2024-02-05 NOTE — LETTER
2024         RE: Michael Maldonado   [facility] [address]  [place] [address]        Dear Colleague,    Thank you for referring your patient, Michael Maldonado, to the [facility] [place]. Please see a copy of my visit note below.    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  Previous workup included EEG that showed left parasagittal sharp discharge.  He was started on Keppra and has remained symptom-free.  I have recommended:    1.  Continue Keppra 500 mg twice daily  2.  Check electrolyte panel and Keppra levels  3.  Patient was counseled about the diagnosis  4.  Follow-up in 1 year    Thank you again for this referral, please feel free to contact me if you have any questions.    Carlitos Finch MD  [facility]  (Formerly, [facility]     HISTORY OF PRESENT ILLNESS     Patient is  a 83-year-old male with history of HTN, HLD, alcohol abuse, hypothyroidism, cerebral AV malformation s/p craniotomy in , parotid cancer who was initially evaluated on 11/15/2022 after an episode of confusion.  Workup for TIA was normal but EEG showed a left parasagittal sharp discharge and he was diagnosed with complex partial seizures and started on Keppra.  He was last seen on 2023 and was continued on Keppra 500 mg twice daily and had a Keppra level checked that was normal.   Since his last visit he denies any new complaints.  His seizures have remained well-controlled.    BRIEFLY patient is a male with history of HTN, HLD, EtOH abuse, cerebral AV malformation s/p craniotomy in 1970, parotid gland cancer who was initially seen on 11/15/2022 after an evaluation in the ER for an episode of confusion. According to wife he had acute onset when he was asking questions repetitively initially when he presented to the emergency room he had a CT of the head and CTA that was unremarkable aside from some chronic changes.  Although admission was recommended he wanted to go home.  He was started on aspirin and simvastatin and subsequently had an EEG that was abnormal showing left parasagittal sharp discharges suggesting low threshold for seizure.  30-day event monitor and echocardiogram were normal.  He was started on Keppra.     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    Surgical History:  He  has a past surgical history that includes Craniotomy For Avm (1970); Total Knee Arthroplasty (Left, 2013); Salivary gland surgery (Right, 2011); joint replacement; and TOTAL  "KNEE ARTHROPLASTY (Right, 1/7/2019).     SOCIAL HISTORY     Reviewed, and he  reports that he quit smoking about 24 years ago. His smoking use included cigars. He has never been exposed to tobacco smoke. He has never used smokeless tobacco. He reports current alcohol use of about 1.0 standard drink of alcohol per week. He reports that he does not use drugs.     FAMILY HISTORY     Reviewed, and family history includes Cirrhosis in his father; Diabetes in his father.     ALLERGIES     No Known Allergies      REVIEW OF SYSTEMS     A 12 point review of system was performed and was negative except as outlined in the history of present illness.     HOME MEDICATIONS     Current Outpatient Rx   Medication Sig Dispense Refill     amLODIPine (NORVASC) 5 MG tablet TAKE 1 TABLET BY MOUTH EVERY DAY 90 tablet 3     aspirin (ASA) 81 MG EC tablet Take 1 tablet by mouth every other day       fluorouracil (EFUDEX) 5 % external cream APPLY 1 APPLICATION TOPICALLY TO SCALP AND FOREHEAD       levETIRAcetam (KEPPRA) 500 MG tablet Take 1 tablet (500 mg) by mouth 2 times daily 180 tablet 3     levothyroxine (SYNTHROID/LEVOTHROID) 88 MCG tablet Take 1 tablet (88 mcg) by mouth daily 90 tablet 3     losartan (COZAAR) 50 MG tablet Take 1 tablet (50 mg) by mouth daily 90 tablet 3     omeprazole (PRILOSEC) 20 MG DR capsule TAKE 1 CAPSULE (20 MG TOTAL) BY MOUTH 2 (TWO) TIMES A DAY BEFORE MEALS. Strength: 20 mg 180 capsule 2     simvastatin (ZOCOR) 80 MG tablet Take 0.5 tablets (40 mg) by mouth daily 45 tablet 3         PHYSICAL EXAM     Vital signs  /67 (BP Location: Right arm, Patient Position: Sitting)   Pulse 81   Ht 1.778 m (5' 10\")   Wt 92.1 kg (203 lb)   BMI 29.13 kg/m      Weight:   203 lbs 0 oz    Elderly gentleman who is alert and oriented vital signs were reviewed and documented in electronic medical record.  Neck was supple.  He has facial deformity due to previous surgery cranial nerves are intact except for sensorineural " hearing loss.  He has normal mass and tone with 5/5 strength reflexes 1+ toes equivocal.  He has a wide-based gait and has trouble tandem walking.  Sensation intact with no double simultaneous extinction     PERTINENT DIAGNOSTIC STUDIES     Following studies were reviewed:     CTA HEAD & NECK 11/10/2022  HEAD CT:  1.  No finding for intracranial hemorrhage or mass or convincing finding for acute infarct.     2.  Postsurgical changes relating to prior left parietal craniotomy. Broad area of encephalomalacic change gliosis is seen within the subjacent left frontal lobe and surgical clips are seen in the region of the pineal gland.     3.  There is marked dilatation of the atrium of the left lateral ventricle with an apparent arachnoid cyst bowing the septum pellucidum to the right. There is prominent enlargement of the left temporal horn and occipital horn without enlargement of the   left frontal horn suggesting postobstructive dilatation of the temporal and occipital horns.     4.  If prior studies can be provided for comparison, a comparison will be made and an addendum issued. If no prior studies are available for comparison, consider correlation with MRI for further assessment.     5.  Mild volume loss and presumed sequela chronic microvascular ischemic change.     HEAD CTA:   1. No finding for vascular cutoff, aneurysm, or flow-limiting stenosis.  2. Coarse atherosclerotic plaque is seen within both carotid siphons without significant associated luminal stenosis.     NECK CTA:  1.  Moderate atherosclerotic plaque right carotid bulb/bifurcation with mild associated luminal narrowing.     2.  Coarse atherosclerotic plaque is seen within the proximal right vertebral artery with more mild atherosclerotic plaque on the left. There is moderate or high-grade narrowing of the proximal right vertebral artery which, where visualized, is similar   to the 1/22/2019 CTA chest.     EEG 11/23/2022  This is an abnormal EEG due  to asymmetric slowing of the background that is maximally expressed on the left hemisphere. Asymmetry of the amplitude likely is due to breach effect. Additionally polyspike discharges are noted in the left parasagittal head region with phase reversal at C3 that suggest a low threshold for seizures.      30-DAY EVENT MONITOR 12/28/2022  Cardiac event monitoring from 12/28/2022 to 1/26/2023 (monitored duration 16d 19h 17m).  Baseline rhythm was sinus rhythm.    Reported heart rate range 50 to 120bpm, average 71bpm.  No symptom triggers.  Seven automated recordings included sinus rhythm with 1st degree AV delay and rare PACs and PVCs.  No sustained tachyarrhythmias.  No atrial fibrillation.  There were no pauses noted.  Supraventricular and ventricular ectopic beat frequency are not reported on this monitoring modality.       ECHOCARDIOGRAM 12/21/2022  The left ventricle is normal in size with mild concentric left ventricular  hypertrophy.  Left ventricular function is normal.The ejection fraction is 60-65%.  Normal right ventricle size and systolic function.  A contrast injection (Bubble Study) was performed that was negative for flow  across the interatrial septum.  Mild age-related valve disease is present without significant stenosis or  regurgitation.  There is no comparison study available.     PERTINENT LABS  Following labs were reviewed:  Office Visit on 01/30/2024   Component Date Value Ref Range Status     Sodium 01/30/2024 140  135 - 145 mmol/L Final     Potassium 01/30/2024 4.2  3.4 - 5.3 mmol/L Final     Carbon Dioxide (CO2) 01/30/2024 25  22 - 29 mmol/L Final     Anion Gap 01/30/2024 10  7 - 15 mmol/L Final     Urea Nitrogen 01/30/2024 14.1  8.0 - 23.0 mg/dL Final     Creatinine 01/30/2024 1.18 (H)  0.67 - 1.17 mg/dL Final     GFR Estimate 01/30/2024 62  >60 mL/min/1.73m2 Final     Calcium 01/30/2024 9.3  8.8 - 10.2 mg/dL Final     Chloride 01/30/2024 105  98 - 107 mmol/L Final     Glucose 01/30/2024 99   70 - 99 mg/dL Final     Alkaline Phosphatase 01/30/2024 75  40 - 150 U/L Final     AST 01/30/2024 31  0 - 45 U/L Final     ALT 01/30/2024 30  0 - 70 U/L Final     Protein Total 01/30/2024 7.1  6.4 - 8.3 g/dL Final     Albumin 01/30/2024 4.6  3.5 - 5.2 g/dL Final     Bilirubin Total 01/30/2024 0.8  <=1.2 mg/dL Final     WBC Count 01/30/2024 4.0  4.0 - 11.0 10e3/uL Final     RBC Count 01/30/2024 5.04  4.40 - 5.90 10e6/uL Final     Hemoglobin 01/30/2024 15.2  13.3 - 17.7 g/dL Final     Hematocrit 01/30/2024 46.4  40.0 - 53.0 % Final     MCV 01/30/2024 92  78 - 100 fL Final     MCH 01/30/2024 30.2  26.5 - 33.0 pg Final     MCHC 01/30/2024 32.8  31.5 - 36.5 g/dL Final     RDW 01/30/2024 13.7  10.0 - 15.0 % Final     Platelet Count 01/30/2024 129 (L)  150 - 450 10e3/uL Final     TSH 01/30/2024 7.54 (H)  0.30 - 4.20 uIU/mL Final     Cholesterol 01/30/2024 152  <200 mg/dL Final     Triglycerides 01/30/2024 110  <150 mg/dL Final     Direct Measure HDL 01/30/2024 53  >=40 mg/dL Final     LDL Cholesterol Calculated 01/30/2024 77  <=100 mg/dL Final     Non HDL Cholesterol 01/30/2024 99  <130 mg/dL Final     Patient Fasting > 8hrs? 01/30/2024 Unknown   Final         Total time spent for face to face visit, reviewing labs/imaging studies, counseling and coordination of care was: 30 Minutes spent on the date of the encounter doing chart review, review of outside records, review of test results, interpretation of tests, patient visit, and documentation   The longitudinal plan of care for complex partial seizures was addressed during this visit. Due to the added complexity in care, I will continue to support Michael in the subsequent management of this condition(s) and with the ongoing continuity of care of this condition(s).  This note was dictated using voice recognition software.  Any grammatical or context distortions are unintentional and inherent to the software.    Orders Placed This Encounter   Procedures     Keppra  (Levetiracetam) Level     Electrolyte panel      New Prescriptions    No medications on file     Modified Medications    Modified Medication Previous Medication    LEVETIRACETAM (KEPPRA) 500 MG TABLET levETIRAcetam (KEPPRA) 500 MG tablet       Take 1 tablet (500 mg) by mouth 2 times daily    TAKE 1 TABLET BY MOUTH TWICE A DAY                 Again, thank you for allowing me to participate in the care of your patient.        Sincerely,        Carlitos Finch MD

## 2024-02-12 ENCOUNTER — TRANSFERRED RECORDS (OUTPATIENT)
Dept: HEALTH INFORMATION MANAGEMENT | Facility: CLINIC | Age: 84
End: 2024-02-12
Payer: COMMERCIAL

## 2024-03-07 ENCOUNTER — TRANSFERRED RECORDS (OUTPATIENT)
Dept: HEALTH INFORMATION MANAGEMENT | Facility: CLINIC | Age: 84
End: 2024-03-07
Payer: COMMERCIAL

## 2024-04-01 DIAGNOSIS — K21.9 GASTROESOPHAGEAL REFLUX DISEASE WITHOUT ESOPHAGITIS: ICD-10-CM

## 2024-04-01 DIAGNOSIS — I10 PRIMARY HYPERTENSION: ICD-10-CM

## 2024-04-01 DIAGNOSIS — E78.2 MIXED HYPERLIPIDEMIA: ICD-10-CM

## 2024-04-01 DIAGNOSIS — I10 ESSENTIAL (PRIMARY) HYPERTENSION: ICD-10-CM

## 2024-04-01 RX ORDER — LOSARTAN POTASSIUM 50 MG/1
50 TABLET ORAL DAILY
Qty: 90 TABLET | Refills: 2 | Status: SHIPPED | OUTPATIENT
Start: 2024-04-01 | End: 2024-04-10

## 2024-04-01 RX ORDER — SIMVASTATIN 80 MG
40 TABLET ORAL DAILY
Qty: 45 TABLET | Refills: 3 | OUTPATIENT
Start: 2024-04-01

## 2024-04-02 RX ORDER — AMLODIPINE BESYLATE 5 MG/1
TABLET ORAL
Qty: 90 TABLET | Refills: 3 | OUTPATIENT
Start: 2024-04-02

## 2024-04-10 DIAGNOSIS — I10 ESSENTIAL (PRIMARY) HYPERTENSION: ICD-10-CM

## 2024-04-10 RX ORDER — LOSARTAN POTASSIUM 50 MG/1
50 TABLET ORAL DAILY
Qty: 90 TABLET | Refills: 1 | Status: SHIPPED | OUTPATIENT
Start: 2024-04-10 | End: 2024-08-21

## 2024-04-15 DIAGNOSIS — K21.9 GASTROESOPHAGEAL REFLUX DISEASE WITHOUT ESOPHAGITIS: ICD-10-CM

## 2024-04-15 NOTE — TELEPHONE ENCOUNTER
FUV - syncopal event at work 10/10/23. She reports had episode of dry heaving, passed out, was taken to ED where CTH/CTA head was done- showed right anterior fossa mass. A few days later had witnessed transient episode of slurred speech with left facial droop and AMS, was again taken to ED where MRI brain was done. Family and patient denies any convulsions, or tremors, speech difficulty or confusion otherwise. No limb weakness, visual changes, or other focal neuro deficits. Plan per Adelaida Cuevas NP - EEG, neurology referral for possible seizure work-up. Repeat MRI brain w/wo in 6 months and follow-up with Dr. Rivera. She is here to review plan in detail.   Refill Approved    Rx renewed per Medication Renewal Policy.    Gricelda Ramirez, [facility] Triage/Med Refill 6/25/2021     Requested Prescriptions   Pending Prescriptions Disp Refills     losartan (COZAAR) 50 MG tablet [Pharmacy Med Name: LOSARTAN POTASSIUM 50 MG TAB] 90 tablet 1     Sig: TAKE 1 TABLET BY MOUTH EVERY DAY       Angiotensin Receptor Blocker Protocol Passed - 6/25/2021  1:39 AM        Passed - PCP or prescribing provider visit in past 12 months       Last office visit with prescriber/PCP: 11/12/2020 Malvin Caballero MD OR same dept: 11/12/2020 Malvin Caballero MD OR same specialty: 11/12/2020 Malvin Caballero MD  Last physical: Visit date not found Last MTM visit: Visit date not found   Next visit within 3 mo: Visit date not found  Next physical within 3 mo: Visit date not found  Prescriber OR PCP: Malvin Caballero MD  Last diagnosis associated with med order: 1. Essential hypertension  - losartan (COZAAR) 50 MG tablet [Pharmacy Med Name: LOSARTAN POTASSIUM 50 MG TAB]; TAKE 1 TABLET BY MOUTH EVERY DAY  Dispense: 90 tablet; Refill: 1    If protocol passes may refill for 12 months if within 3 months of last provider visit (or a total of 15 months).             Passed - Serum potassium within the past 12 months     Lab Results   Component Value Date    Potassium 4.0 11/12/2020             Passed - Blood pressure filed in past 12 months     BP Readings from Last 1 Encounters:   11/12/20 128/66             Passed - Serum creatinine within the past 12 months     Creatinine   Date Value Ref Range Status   11/12/2020 1.28 0.70 - 1.30 mg/dL Final

## 2024-04-29 ENCOUNTER — TRANSFERRED RECORDS (OUTPATIENT)
Dept: HEALTH INFORMATION MANAGEMENT | Facility: CLINIC | Age: 84
End: 2024-04-29
Payer: COMMERCIAL

## 2024-05-03 ENCOUNTER — TRANSFERRED RECORDS (OUTPATIENT)
Dept: HEALTH INFORMATION MANAGEMENT | Facility: CLINIC | Age: 84
End: 2024-05-03
Payer: COMMERCIAL

## 2024-05-07 ENCOUNTER — TRANSFERRED RECORDS (OUTPATIENT)
Dept: HEALTH INFORMATION MANAGEMENT | Facility: CLINIC | Age: 84
End: 2024-05-07
Payer: COMMERCIAL

## 2024-05-13 ENCOUNTER — TRANSFERRED RECORDS (OUTPATIENT)
Dept: HEALTH INFORMATION MANAGEMENT | Facility: CLINIC | Age: 84
End: 2024-05-13
Payer: COMMERCIAL

## 2024-05-16 ENCOUNTER — TRANSFERRED RECORDS (OUTPATIENT)
Dept: HEALTH INFORMATION MANAGEMENT | Facility: CLINIC | Age: 84
End: 2024-05-16
Payer: COMMERCIAL

## 2024-05-29 ENCOUNTER — TRANSFERRED RECORDS (OUTPATIENT)
Dept: HEALTH INFORMATION MANAGEMENT | Facility: CLINIC | Age: 84
End: 2024-05-29
Payer: COMMERCIAL

## 2024-06-04 ENCOUNTER — TRANSFERRED RECORDS (OUTPATIENT)
Dept: HEALTH INFORMATION MANAGEMENT | Facility: CLINIC | Age: 84
End: 2024-06-04
Payer: COMMERCIAL

## 2024-06-11 ENCOUNTER — TRANSFERRED RECORDS (OUTPATIENT)
Dept: HEALTH INFORMATION MANAGEMENT | Facility: CLINIC | Age: 84
End: 2024-06-11
Payer: COMMERCIAL

## 2024-07-01 ENCOUNTER — TRANSFERRED RECORDS (OUTPATIENT)
Dept: HEALTH INFORMATION MANAGEMENT | Facility: CLINIC | Age: 84
End: 2024-07-01
Payer: COMMERCIAL

## 2024-07-08 ENCOUNTER — TRANSFERRED RECORDS (OUTPATIENT)
Dept: HEALTH INFORMATION MANAGEMENT | Facility: CLINIC | Age: 84
End: 2024-07-08
Payer: COMMERCIAL

## 2024-07-15 DIAGNOSIS — I10 PRIMARY HYPERTENSION: ICD-10-CM

## 2024-07-15 RX ORDER — AMLODIPINE BESYLATE 5 MG/1
TABLET ORAL
Qty: 90 TABLET | Refills: 1 | Status: SHIPPED | OUTPATIENT
Start: 2024-07-15

## 2024-07-25 ENCOUNTER — OFFICE VISIT (OUTPATIENT)
Dept: INTERNAL MEDICINE | Facility: CLINIC | Age: 84
End: 2024-07-25
Payer: COMMERCIAL

## 2024-07-25 VITALS
DIASTOLIC BLOOD PRESSURE: 68 MMHG | HEIGHT: 70 IN | BODY MASS INDEX: 28.35 KG/M2 | HEART RATE: 74 BPM | TEMPERATURE: 98.4 F | RESPIRATION RATE: 16 BRPM | OXYGEN SATURATION: 96 % | WEIGHT: 198 LBS | SYSTOLIC BLOOD PRESSURE: 124 MMHG

## 2024-07-25 DIAGNOSIS — E03.4 HYPOTHYROIDISM DUE TO ACQUIRED ATROPHY OF THYROID: Primary | ICD-10-CM

## 2024-07-25 LAB — TSH SERPL DL<=0.005 MIU/L-ACNC: 5.28 UIU/ML (ref 0.3–4.2)

## 2024-07-25 PROCEDURE — 84443 ASSAY THYROID STIM HORMONE: CPT | Performed by: INTERNAL MEDICINE

## 2024-07-25 PROCEDURE — 36415 COLL VENOUS BLD VENIPUNCTURE: CPT | Performed by: INTERNAL MEDICINE

## 2024-07-25 PROCEDURE — 99213 OFFICE O/P EST LOW 20 MIN: CPT | Performed by: INTERNAL MEDICINE

## 2024-07-25 PROCEDURE — G2211 COMPLEX E/M VISIT ADD ON: HCPCS | Performed by: INTERNAL MEDICINE

## 2024-07-25 NOTE — PROGRESS NOTES
Office Visit - Follow Up   Michael Maldonado   84 year old male    Date of Visit: 7/25/2024    Chief Complaint   Patient presents with    Back Pain    Hypertension        -------------------------------------------------------------------------------------------------------------------------  Assessment and Plan    Follow-up multiple issues, main thing we need to do today is recheck Michael's TSH    Hypothyroidism on replacement, on levothyroxine 88 mcg a day  Elevated TSH January 2024  I reminded Michael of the importance of taping taking his levothyroxine first thing in the morning on empty stomach, no other food or medication for 30 minutes.  This is to ensure proper absorption.    His wife Barbie is going to go through all of Michael's pill bottles to make sure they match today's medication list    1-  TSH  0.30 - 4.20 uIU/mL 7.54 High      Actinic keratoses on face and scalp, for which Michael finished up 4 days of topical 5-fluorouracil with calcipotriene, which was prescribed by [facility] [place], as an alternative to Mohs surgery     Lumbar facet arthropathy and back pain- back doing OK as of January 2024  Michael was working with Dr. Curry at [facility]  Michael underwent a diagnostic facet nerve injection mid July 2023, and he had a good response.    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  Does NOT need to take antibiotics prior to routine dental procedures such as cleanings  Michael has been in the habit of taking amoxicillin prior to dental procedures, because of his artificial joints.  I told Michael that those implants are mature, and according to guidelines from the American Academy of Orthopedic Surgery, he does not need to take antibiotics anymore only for prophylaxis because of artificial joint.     If he needs to  be on antibiotics because of the dental procedure itself, for example if he has an abscessed tooth, that is a different matter.  Before routine dental cleanings, no antibiotics are needed.     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 Finch of [facility] neurology.     Michael is tolerating that medication just fine.  He will be following up with Dr. Finch, and possibly may be able to resume driving, assuming he remains seizure-free.      History of craniotomy for an arteriovenous malformation in 1970, with good long-term recovery     Atherosclerotic deposits in both carotid siphons and right carotid bulb/bifurcation with mild associated luminal narrowing, and plaque in the proximal right vertebral artery, milder atherosclerotic plaque left vertebral artery, by CT angiography November 10, 2022     Michael takes high-dose simvastatin and also DAILY baby aspirin, and has good blood pressure control.     He has undergone a very thorough work-up that started in the emergency department on November 10, 2022, with additional testing orchestrated by Dr. Finch     HEAD CTA:   1. No finding for vascular cutoff, aneurysm, or flow-limiting stenosis.  2. Coarse atherosclerotic plaque is seen within both carotid siphons without significant associated luminal stenosis.  NECK CTA:  1.  Moderate atherosclerotic plaque right carotid bulb/bifurcation with mild associated luminal narrowing.  2.  Coarse atherosclerotic plaque is seen within the proximal right vertebral artery with more mild atherosclerotic plaque on the left. There is moderate or high-grade narrowing of the proximal right vertebral artery which, where visualized, is  similar to the 1/22/2019 CTA chest.     11- EEG  Impression: This is an abnormal EEG due to asymmetric slowing of the background that is maximally expressed on the left hemisphere.  Asymmetry of the amplitude likely is due to breach effect.  Additionally polyspike discharges are noted in the left parasagittal head region with phase reversal at C3 that suggest a low threshold for seizures.     Classification: Dysrhythmia grade III left parasagittal (C3)                            Dysrhythmia grade II generalized maximum left                            Breach effect     12- echo  The left ventricle is normal in size with mild concentric left ventricular  hypertrophy.  Left ventricular function is normal.The ejection fraction is 60-65%.  Normal right ventricle size and systolic function.  A contrast injection (Bubble Study) was performed that was negative for flow  across the interatrial septum.  Mild age-related valve disease is present without significant stenosis or  regurgitation.  There is no comparison study available.     Left shoulder rotator cuff syndrome, had steroid injection in 2 spots at his left shoulder at [facility] Monday, January 23     Right sided eye surgery to correct strabismus April 15, 2022     Overweight  with body mass index of 29.3  has reduced the alcohol consumption, instead of martinis, he will have a small glass of rafael     Wt Readings from Last 5 Encounters:   07/25/24 89.8 kg (198 lb)   02/05/24 92.1 kg (203 lb)   01/30/24 92.5 kg (204 lb)   07/25/23 89.8 kg (198 lb)   05/17/23 88.5 kg (195 lb)     Short-term Memory impairment-- longstanding  History of craniotomy for an arteriovenous malformation in 1970, with good long-term recovery.    Could not recall 3 objects today 1-     Essential hypertension, reasonably well controlled on combination of losartan plus amlodipine.  We will keep his blood pressure medicines the same.   BP Readings from Last 6  Encounters:   07/25/24 124/68   02/05/24 117/67   01/30/24 114/74   07/25/23 120/78   05/17/23 (!) 162/77   04/23/23 133/74     Hyperlipidemia on high-dose simvastatin, lipids reasonably well controlled  Carotid and vertebral artery atherosclerosis  Cholesterol panel from January 21, 2022 showed good control with total cholesterol 159, triglycerides 96, HDL 55, LDL 85.     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         History of deep venous thrombosis associated with the 2019 knee arthroplasty, no longer needs to be on anticoagulation.       History of likely COVID with cold symptoms, week of January 10, 2022, lasted a few days, then his wife tested positive    Immunization History   Administered Date(s) Administered    COVID-19 12+ (2023-24) (Pfizer) 11/09/2023    COVID-19 Bivalent 12+ (Pfizer) 10/03/2022    COVID-19 MONOVALENT 12+ (Pfizer) 02/16/2021, 03/09/2021, 10/04/2021    COVID-19 Monovalent 12+ (Pfizer 2022) 04/05/2022    Flu, Unspecified 09/21/2015, 10/11/2017    Influenza (H1N1) 12/22/2009    Influenza (High Dose) 3 valent vaccine 1941, 09/20/2015, 09/27/2016, 10/11/2017, 09/26/2018, 09/24/2019, 11/09/2023    Influenza (IIV3) PF 09/01/2010, 09/26/2011, 09/17/2012, 09/25/2013, 09/14/2014    Influenza Vaccine 65+ (FLUAD) 09/12/2022    Influenza Vaccine 65+ (Fluzone HD) 09/09/2020, 09/28/2021, 11/09/2023    Pneumo Conj 13-V (2010&after) 05/20/2015    Pneumococcal 20 valent Conjugate (Prevnar 20) 01/30/2024    Pneumococcal 23 valent 01/01/2007, 08/06/2010    TD,PF 7+ (Tenivac) 01/01/2008    TDAP (Adacel,Boostrix) 08/10/2012    Td,adult,historic,unspecified 01/01/2008    Zoster recombinant adjuvanted (SHINGRIX) 09/26/2018, 01/24/2019, 09/19/2019    Zoster vaccine, live 04/25/2012            --------------------------------------------------------------------------------------------------------------------------  History of Present Illness  This 84 year old old     Reason for visit:  Back pain and HTN     He eats 2-3 servings of fruits and vegetables daily.He consumes 0 sweetened beverage(s) daily.He exercises with enough effort to increase his heart rate 20 to 29 minutes per day.  He exercises with enough effort to increase his heart rate 4 days per week.   He is taking medications regularly.      Wt Readings from Last 3 Encounters:   24 89.8 kg (198 lb)   24 92.1 kg (203 lb)   24 92.5 kg (204 lb)     BP Readings from Last 3 Encounters:   24 124/68   24 117/67   24 114/74     ---------------------------------------------------------------------------------------------------------------------------    Medications, Allergies, Social, and Problem List       Current Outpatient Medications   Medication Sig Dispense Refill    amLODIPine (NORVASC) 5 MG tablet TAKE 1 TABLET BY MOUTH EVERY DAY 90 tablet 1    aspirin (ASA) 81 MG EC tablet Take 1 tablet by mouth every other day      fluorouracil (EFUDEX) 5 % external cream APPLY 1 APPLICATION TOPICALLY TO SCALP AND FOREHEAD      levETIRAcetam (KEPPRA) 500 MG tablet Take 1 tablet (500 mg) by mouth 2 times daily 180 tablet 3    levothyroxine (SYNTHROID/LEVOTHROID) 88 MCG tablet Take 1 tablet (88 mcg) by mouth daily 90 tablet 3    losartan (COZAAR) 50 MG tablet TAKE 1 TABLET BY MOUTH EVERY DAY 90 tablet 1    omeprazole (PRILOSEC) 20 MG DR capsule TAKE 1 CAPSULE BY MOUTH TWICE DAILY BEFORE MEALS 180 capsule 2    simvastatin (ZOCOR) 80 MG tablet Take 0.5 tablets (40 mg) by mouth daily 45 tablet 3     No Known Allergies  Social History     Tobacco Use    Smoking status: Former     Types: Cigars     Quit date: 2000     Years since quittin.5     Passive exposure: Never    Smokeless tobacco: Never   Vaping Use     "Vaping status: Never Used   Substance Use Topics    Alcohol use: Yes     Alcohol/week: 1.0 standard drink of alcohol     Comment: Alcoholic Drinks/day: nightly    Drug use: No     Patient Active Problem List   Diagnosis    Hyperlipidemia    Hypertension    Leukopenia    Hypothyroidism due to acquired atrophy of thyroid    Status post total knee replacement, right    History of head and neck cancer    Cancer of parotid gland (H)    Cerebral arteriovenous malformation    Hypothyroidism    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  Healing Mohs procedure lesions on his scalp.     Additional Information   I spent 20 minutes on this encounter, including reviewing interval history since last visit, examining the patient, explaining and counseling the issues enumerated in the Assessment and Plan (patient given a copy), ordering indicated tests,    The longitudinal plan of care for the diagnosis(es)/condition(s) as documented were addressed during this visit. Due to the added complexity in care, I will continue to support Michael in the subsequent management and with ongoing continuity of care.       LUL SHAFFER MD, MD        Signed Electronically by: LUL SHAFFER MD    "

## 2024-07-25 NOTE — PATIENT INSTRUCTIONS
Follow-up multiple issues, main thing we need to do today is recheck Michael's TSH    Hypothyroidism on replacement, on levothyroxine 88 mcg a day  Elevated TSH January 2024  I reminded Michael of the importance of taping taking his levothyroxine first thing in the morning on empty stomach, no other food or medication for 30 minutes.  This is to ensure proper absorption.    His wife Barbie is going to go through all of Michael's pill bottles to make sure they match today's medication list    1-  TSH  0.30 - 4.20 uIU/mL 7.54 High      Actinic keratoses on face and scalp, for which Michael finished up 4 days of topical 5-fluorouracil with calcipotriene, which was prescribed by [facility] in [place], as an alternative to Mohs surgery     Lumbar facet arthropathy and back pain- back doing OK as of January 2024  Michael was working with Dr. Curry at [facility]  Michael underwent a diagnostic facet nerve injection mid July 2023, and he had a good response.    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  Does NOT need to take antibiotics prior to routine dental procedures such as cleanings  Michael has been in the habit of taking amoxicillin prior to dental procedures, because of his artificial joints.  I told Michael that those implants are mature, and according to guidelines from the American Academy of Orthopedic Surgery, he does not need to take antibiotics anymore only for prophylaxis because of artificial joint.     If he needs to be on antibiotics because of the dental procedure itself, for example if he has an abscessed tooth, that is a different matter.  Before routine dental cleanings, no antibiotics are needed.     Partial complex seizure presenting as acute confusional state November 10, 2022, EEG November 23, 2022 was suggestive of  seizure activity, no signs of stroke, started on Keppra 500 mg twice a day  Emergency department visit November 10, 2022 where he was diagnosed with partial complex seizure, and has been started on Keppra, under the supervision of Dr. Finch of [facility] neurology.     Michael is tolerating that medication just fine.  He will be following up with Dr. Finch, and possibly may be able to resume driving, assuming he remains seizure-free.      History of craniotomy for an arteriovenous malformation in 1970, with good long-term recovery     Atherosclerotic deposits in both carotid siphons and right carotid bulb/bifurcation with mild associated luminal narrowing, and plaque in the proximal right vertebral artery, milder atherosclerotic plaque left vertebral artery, by CT angiography November 10, 2022     Michael takes high-dose simvastatin and also DAILY baby aspirin, and has good blood pressure control.     He has undergone a very thorough work-up that started in the emergency department on November 10, 2022, with additional testing orchestrated by Dr. Finch     HEAD CTA:   1. No finding for vascular cutoff, aneurysm, or flow-limiting stenosis.  2. Coarse atherosclerotic plaque is seen within both carotid siphons without significant associated luminal stenosis.  NECK CTA:  1.  Moderate atherosclerotic plaque right carotid bulb/bifurcation with mild associated luminal narrowing.  2.  Coarse atherosclerotic plaque is seen within the proximal right vertebral artery with more mild atherosclerotic plaque on the left. There is moderate or high-grade narrowing of the proximal right vertebral artery which, where visualized, is similar to the 1/22/2019 CTA chest.     11- EEG  Impression: This is an abnormal EEG due to asymmetric slowing of the background that is maximally expressed on the left hemisphere.  Asymmetry of the amplitude likely is due to breach effect.  Additionally polyspike discharges are noted in the left  parasagittal head region with phase reversal at C3 that suggest a low threshold for seizures.     Classification: Dysrhythmia grade III left parasagittal (C3)                            Dysrhythmia grade II generalized maximum left                            Breach effect     12- echo  The left ventricle is normal in size with mild concentric left ventricular  hypertrophy.  Left ventricular function is normal.The ejection fraction is 60-65%.  Normal right ventricle size and systolic function.  A contrast injection (Bubble Study) was performed that was negative for flow  across the interatrial septum.  Mild age-related valve disease is present without significant stenosis or  regurgitation.  There is no comparison study available.     Left shoulder rotator cuff syndrome, had steroid injection in 2 spots at his left shoulder at [facility] Monday, January 23     Right sided eye surgery to correct strabismus April 15, 2022     Overweight  with body mass index of 29.3  has reduced the alcohol consumption, instead of martinis, he will have a small glass of rafael     Wt Readings from Last 5 Encounters:   07/25/24 89.8 kg (198 lb)   02/05/24 92.1 kg (203 lb)   01/30/24 92.5 kg (204 lb)   07/25/23 89.8 kg (198 lb)   05/17/23 88.5 kg (195 lb)     Short-term Memory impairment-- longstanding  History of craniotomy for an arteriovenous malformation in 1970, with good long-term recovery.    Could not recall 3 objects today 1-     Essential hypertension, reasonably well controlled on combination of losartan plus amlodipine.  We will keep his blood pressure medicines the same.   BP Readings from Last 6 Encounters:   07/25/24 124/68   02/05/24 117/67   01/30/24 114/74   07/25/23 120/78   05/17/23 (!) 162/77   04/23/23 133/74     Hyperlipidemia on high-dose simvastatin, lipids reasonably well controlled  Carotid and vertebral artery atherosclerosis  Cholesterol panel from January 21, 2022 showed good control  with total cholesterol 159, triglycerides 96, HDL 55, LDL 85.     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         History of deep venous thrombosis associated with the 2019 knee arthroplasty, no longer needs to be on anticoagulation.       History of likely COVID with cold symptoms, week of January 10, 2022, lasted a few days, then his wife tested positive    Immunization History   Administered Date(s) Administered    COVID-19 12+ (2023-24) (Pfizer) 11/09/2023    COVID-19 Bivalent 12+ (Pfizer) 10/03/2022    COVID-19 MONOVALENT 12+ (Pfizer) 02/16/2021, 03/09/2021, 10/04/2021    COVID-19 Monovalent 12+ (Pfizer 2022) 04/05/2022    Flu, Unspecified 09/21/2015, 10/11/2017    Influenza (H1N1) 12/22/2009    Influenza (High Dose) 3 valent vaccine 1941, 09/20/2015, 09/27/2016, 10/11/2017, 09/26/2018, 09/24/2019, 11/09/2023    Influenza (IIV3) PF 09/01/2010, 09/26/2011, 09/17/2012, 09/25/2013, 09/14/2014    Influenza Vaccine 65+ (FLUAD) 09/12/2022    Influenza Vaccine 65+ (Fluzone HD) 09/09/2020, 09/28/2021, 11/09/2023    Pneumo Conj 13-V (2010&after) 05/20/2015    Pneumococcal 20 valent Conjugate (Prevnar 20) 01/30/2024    Pneumococcal 23 valent 01/01/2007, 08/06/2010    TD,PF 7+ (Tenivac) 01/01/2008    TDAP (Adacel,Boostrix) 08/10/2012    Td,adult,historic,unspecified 01/01/2008    Zoster recombinant adjuvanted (SHINGRIX) 09/26/2018, 01/24/2019, 09/19/2019    Zoster vaccine, live 04/25/2012

## 2024-08-20 ENCOUNTER — TRANSFERRED RECORDS (OUTPATIENT)
Dept: HEALTH INFORMATION MANAGEMENT | Facility: CLINIC | Age: 84
End: 2024-08-20
Payer: COMMERCIAL

## 2024-08-21 DIAGNOSIS — I10 ESSENTIAL (PRIMARY) HYPERTENSION: ICD-10-CM

## 2024-08-21 RX ORDER — LOSARTAN POTASSIUM 50 MG/1
50 TABLET ORAL DAILY
Qty: 90 TABLET | Refills: 1 | OUTPATIENT
Start: 2024-08-21

## 2024-08-21 RX ORDER — LOSARTAN POTASSIUM 50 MG/1
50 TABLET ORAL DAILY
Qty: 90 TABLET | Refills: 3 | Status: SHIPPED | OUTPATIENT
Start: 2024-08-21

## 2024-08-21 NOTE — TELEPHONE ENCOUNTER
Pharmacist calling back stating there is not more refill on the Losartan.   Pt only have enough for 4 days. Pharmacist thinks Pt may have left partial prescription at his cabin.          Medication Question or Refill    Contacts       Contact Date/Time Type Contact Phone/Fax    08/21/2024 11:28 AM CDT Interface (Incoming) [facility] #[id number] [place] [id number] AT [address] [employer] [phone]            What medication are you calling about (include dose and sig)?: Losartan 50 mg    Preferred Pharmacy:   [facility] #[id number] [place] [id number] [employer]  [address]  [facility] [address]  Phone: [phone] Fax: [phone]          Who prescribed the medication?: Dr Caballero    Do you need a refill? Yes    When did you use the medication last? today    Patient offered an appointment? No    Do you have any questions or concerns?  Yes: Pt is running very low on medication      Could we send this information to you in [facility] or would you prefer to receive a phone call?:   Patient would prefer a phone call   Okay to leave a detailed message?: Yes at Home number on file [phone] (home)

## 2024-09-11 ENCOUNTER — ALLIED HEALTH/NURSE VISIT (OUTPATIENT)
Dept: FAMILY MEDICINE | Facility: CLINIC | Age: 84
End: 2024-09-11
Payer: COMMERCIAL

## 2024-09-11 DIAGNOSIS — Z23 NEED FOR INFLUENZA VACCINATION: Primary | ICD-10-CM

## 2024-09-11 PROCEDURE — G0008 ADMIN INFLUENZA VIRUS VAC: HCPCS

## 2024-09-11 PROCEDURE — 90662 IIV NO PRSV INCREASED AG IM: CPT

## 2024-10-01 ENCOUNTER — TRANSFERRED RECORDS (OUTPATIENT)
Dept: HEALTH INFORMATION MANAGEMENT | Facility: CLINIC | Age: 84
End: 2024-10-01
Payer: COMMERCIAL

## 2024-10-08 ENCOUNTER — OFFICE VISIT (OUTPATIENT)
Dept: INTERNAL MEDICINE | Facility: CLINIC | Age: 84
End: 2024-10-08
Payer: COMMERCIAL

## 2024-10-08 VITALS
DIASTOLIC BLOOD PRESSURE: 56 MMHG | OXYGEN SATURATION: 98 % | WEIGHT: 196 LBS | BODY MASS INDEX: 28.06 KG/M2 | HEIGHT: 70 IN | SYSTOLIC BLOOD PRESSURE: 120 MMHG | TEMPERATURE: 97.6 F | HEART RATE: 78 BPM | RESPIRATION RATE: 18 BRPM

## 2024-10-08 DIAGNOSIS — I10 PRIMARY HYPERTENSION: ICD-10-CM

## 2024-10-08 DIAGNOSIS — Z01.818 PREOPERATIVE EXAMINATION: Primary | ICD-10-CM

## 2024-10-08 DIAGNOSIS — C44.42 SQUAMOUS CELL CARCINOMA OF SCALP: ICD-10-CM

## 2024-10-08 DIAGNOSIS — E03.4 HYPOTHYROIDISM DUE TO ACQUIRED ATROPHY OF THYROID: ICD-10-CM

## 2024-10-08 DIAGNOSIS — G40.209 PARTIAL COMPLEX SEIZURE DISORDER WITHOUT INTRACTABLE EPILEPSY (H): ICD-10-CM

## 2024-10-08 DIAGNOSIS — I65.23 CAROTID ATHEROSCLEROSIS, BILATERAL: ICD-10-CM

## 2024-10-08 DIAGNOSIS — E03.9 HYPOTHYROIDISM, UNSPECIFIED TYPE: ICD-10-CM

## 2024-10-08 LAB
ATRIAL RATE - MUSE: 67 BPM
DIASTOLIC BLOOD PRESSURE - MUSE: NORMAL MMHG
INTERPRETATION ECG - MUSE: NORMAL
P AXIS - MUSE: 43 DEGREES
POTASSIUM SERPL-SCNC: 4.2 MMOL/L (ref 3.4–5.3)
PR INTERVAL - MUSE: 232 MS
QRS DURATION - MUSE: 100 MS
QT - MUSE: 412 MS
QTC - MUSE: 435 MS
R AXIS - MUSE: 60 DEGREES
SYSTOLIC BLOOD PRESSURE - MUSE: NORMAL MMHG
T AXIS - MUSE: 23 DEGREES
TSH SERPL DL<=0.005 MIU/L-ACNC: 4.05 UIU/ML (ref 0.3–4.2)
VENTRICULAR RATE- MUSE: 67 BPM

## 2024-10-08 PROCEDURE — 93005 ELECTROCARDIOGRAM TRACING: CPT | Performed by: NURSE PRACTITIONER

## 2024-10-08 PROCEDURE — 84132 ASSAY OF SERUM POTASSIUM: CPT | Performed by: NURSE PRACTITIONER

## 2024-10-08 PROCEDURE — 99214 OFFICE O/P EST MOD 30 MIN: CPT | Performed by: NURSE PRACTITIONER

## 2024-10-08 PROCEDURE — 36415 COLL VENOUS BLD VENIPUNCTURE: CPT | Performed by: NURSE PRACTITIONER

## 2024-10-08 PROCEDURE — G2211 COMPLEX E/M VISIT ADD ON: HCPCS | Performed by: NURSE PRACTITIONER

## 2024-10-08 PROCEDURE — 84443 ASSAY THYROID STIM HORMONE: CPT | Performed by: NURSE PRACTITIONER

## 2024-10-08 NOTE — PROGRESS NOTES
Preoperative Evaluation  [facility]  [address] [address]  Phone: [phone]  Fax: [phone]  Primary Provider: LUL SHAFFER MD  Pre-op Performing Provider: Payam Huynh CNP  Oct 8, 2024             10/8/2024   Surgical Information   What procedure is being done? Pre-cancerous removal on his head   Facility or Hospital where procedure/surgery will be performed: [facility]   Who is doing the procedure / surgery? Bri Hummel MD   Date of surgery / procedure: 10/15/2024   Time of surgery / procedure: TBD   Where do you plan to recover after surgery? at home with family        Fax number for surgical facility: Unknown    Assessment & Plan     The proposed surgical procedure is considered INTERMEDIATE risk.    Preoperative examination  No contraindications for planned procedure.  EKG personally reviewed a sinus rhythm with first-degree AV block.  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  He has not had a seizure in many years    Hypothyroidism, unspecified type  Stable dose Synthroid    Primary hypertension  Controlled on losartan and amlodipine    Carotid atherosclerosis, bilateral  On simvastatin and baby aspirin.  He will do a 7-day hold on his aspirin prior to this procedure. No history of CVA or MI    Patient Instructions   Hold all supplements, aspirin and NSAIDs for 7 days prior to surgery.     Follow your surgeon's direction on when to stop eating and drinking prior to surgery.    Your surgeon will be managing your pain after your surgery.      Remove all jewelry and metal piercings before your surgery.     Remove nail polish from fingers before surgery.    If you use a CPAP machine, bring this with you to  surgery.     The longitudinal plan of care for the diagnosis(es)/condition(s) as documented were addressed during this visit. Due to the added complexity in care, I will continue to support Micahel in the subsequent management and with ongoing continuity of care.       - No identified additional risk factors other than previously addressed         Recommendation  Approval given to proceed with proposed procedure, without further diagnostic evaluation.    Renee Rodriguez is a 84 year old, presenting for the following:  Pre-Op Exam          10/8/2024     9:57 AM   Additional Questions   Roomed by PADILLA Houston     HPI related to upcoming procedure: As above        10/8/2024   Pre-Op Questionnaire   Have you ever had a heart attack or stroke? No   Have you ever had surgery on your heart or blood vessels, such as a stent placement, a coronary artery bypass, or surgery on an artery in your head, neck, heart, or legs? No   Do you have chest pain with activity? No   Do you have a history of heart failure? No   Do you currently have a cold, bronchitis or symptoms of other infection? No   Do you have a cough, shortness of breath, or wheezing? No   Do you or anyone in your family have previous history of blood clots? No   Do you or does anyone in your family have a serious bleeding problem such as prolonged bleeding following surgeries or cuts? No   Have you ever had problems with anemia or been told to take iron pills? No   Have you had any abnormal blood loss such as black, tarry or bloody stools? No   Have you ever had a blood transfusion? No   Are you willing to have a blood transfusion if it is medically needed before, during, or after your surgery? Yes   Have you or any of your relatives ever had problems with anesthesia? No   Do you have sleep apnea, excessive snoring or daytime drowsiness? No   Do you have any artifical heart valves or other implanted medical devices like a pacemaker, defibrillator, or continuous glucose  monitor? No   Do you have artificial joints? No   Are you allergic to latex? No        Health Care Directive  Patient has a Health Care Directive on file      Preoperative Review of    reviewed - no record of controlled substances prescribed.          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) 01/25/2023     Priority: Medium    Carotid atherosclerosis, bilateral 01/25/2023     Priority: Medium    Atherosclerosis of vertebral artery 01/25/2023     Priority: Medium    Cerebral arteriovenous malformation 11/15/2022     Priority: Medium     Dec 15, 2020 Entered By: ART RASMUSSEN JR Comment: AVM in brain - had hemorrhaged 1969 - surgery 1969 [facility]-&gt; since no problems -exam craniotomyDec 15, 2020 Entered By: ART RASMUSSEN JR Comment: [facility]      Hypothyroidism 11/15/2022     Priority: Medium     Dec 15, 2020 Entered By: ART RASMUSSEN JR Comment: hypothyroidism - on levothyroxine for a while decent control.      History of head and neck cancer 11/12/2020     Priority: Medium    Status post total knee replacement, right 01/07/2019     Priority: Medium    Hypothyroidism due to acquired atrophy of thyroid 04/11/2018     Priority: Medium    Hypertension 04/25/2017     Priority: Medium    Hyperlipidemia 04/12/2016     Priority: Medium    Leukopenia 11/17/2010     Priority: Medium    Cancer of parotid gland (H) 10/08/2010     Priority: Medium     Dec 15, 2020 Entered By: ART RASMUSSEN JR Comment: r sq cell cancer r neck - patient says parotide 2011 - then radiation        Past Medical History:   Diagnosis Date    BPH (benign prostatic hyperplasia)     Head and neck cancer (H) 4/12/2016    History of deep vein thrombophlebitis of lower extremity 11/9/2018    Hyperlipidemia     Hypertension     Leukopenia     mild    Osteoarthritis     Stroke (H)      Past Surgical History:   Procedure  "Laterality Date    CRANIOTOMY FOR AVM  1970    clipped    JOINT REPLACEMENT      SALIVARY GLAND SURGERY Right     squamous cell cancer grade 3    TOTAL KNEE ARTHROPLASTY Left     ZZC TOTAL KNEE ARTHROPLASTY Right 2019    Procedure: RIGHT TOTAL KNEE  ARTHROPLASTY;  Surgeon: Darrel Page DO;  Location: [facility] Main OR;  Service: Orthopedics     Current Outpatient Medications   Medication Sig Dispense Refill    amLODIPine (NORVASC) 5 MG tablet TAKE 1 TABLET BY MOUTH EVERY DAY 90 tablet 1    aspirin (ASA) 81 MG EC tablet Take 1 tablet by mouth every other day      fluorouracil (EFUDEX) 5 % external cream APPLY 1 APPLICATION TOPICALLY TO SCALP AND FOREHEAD      levETIRAcetam (KEPPRA) 500 MG tablet Take 1 tablet (500 mg) by mouth 2 times daily 180 tablet 3    levothyroxine (SYNTHROID/LEVOTHROID) 88 MCG tablet Take 1 tablet (88 mcg) by mouth daily 90 tablet 3    losartan (COZAAR) 50 MG tablet Take 1 tablet (50 mg) by mouth daily. 90 tablet 3    omeprazole (PRILOSEC) 20 MG DR capsule TAKE 1 CAPSULE BY MOUTH TWICE DAILY BEFORE MEALS 180 capsule 2    simvastatin (ZOCOR) 80 MG tablet Take 0.5 tablets (40 mg) by mouth daily 45 tablet 3       No Known Allergies     Social History     Tobacco Use    Smoking status: Former     Types: Cigars     Quit date: 2000     Years since quittin.7     Passive exposure: Never    Smokeless tobacco: Never   Substance Use Topics    Alcohol use: Yes     Alcohol/week: 1.0 standard drink of alcohol     Comment: Alcoholic Drinks/day: nightly       History   Drug Use No               Objective    Resp 18   Ht 1.778 m (5' 10\")   Wt 88.9 kg (196 lb)   BMI 28.12 kg/m     Estimated body mass index is 28.12 kg/m  as calculated from the following:    Height as of this encounter: 1.778 m (5' 10\").    Weight as of this encounter: 88.9 kg (196 lb).  Physical Exam  GENERAL: alert and no distress  NECK: no adenopathy, no asymmetry, masses, or scars  RESP: lungs clear to auscultation " - no rales, rhonchi or wheezes  CV: regular rate and rhythm, normal S1 S2, no S3 or S4, no murmur, click or rub, no peripheral edema  ABDOMEN: soft, nontender, no hepatosplenomegaly, no masses and bowel sounds normal  MS: no gross musculoskeletal defects noted, no edema    Recent Labs   Lab Test 02/05/24  1108 01/30/24  1002   HGB  --  15.2   PLT  --  129*    140   POTASSIUM 4.9 4.2   CR  --  1.18*        Diagnostics  Labs pending at this time.  Results will be reviewed when available.       Revised Cardiac Risk Index (RCRI)  The patient has the following serious cardiovascular risks for perioperative complications:   - No serious cardiac risks = 0 points     RCRI Interpretation: 1 point: Class II (low risk - 0.9% complication rate)         Signed Electronically by: Payam Huynh CNP  A copy of this evaluation report is provided to the requesting physician.

## 2024-10-22 ENCOUNTER — ALLIED HEALTH/NURSE VISIT (OUTPATIENT)
Dept: FAMILY MEDICINE | Facility: CLINIC | Age: 84
End: 2024-10-22
Payer: COMMERCIAL

## 2024-10-22 DIAGNOSIS — Z23 ENCOUNTER FOR IMMUNIZATION: Primary | ICD-10-CM

## 2024-10-22 PROCEDURE — 99207 PR NO CHARGE NURSE ONLY: CPT

## 2024-10-22 PROCEDURE — 91320 SARSCV2 VAC 30MCG TRS-SUC IM: CPT

## 2024-10-22 PROCEDURE — 90480 ADMN SARSCOV2 VAC 1/ONLY CMP: CPT

## 2024-10-22 NOTE — PROGRESS NOTES
Prior to immunization administration, verified patients identity using patient s name and date of birth. Please see Immunization Activity for additional information.     Screening Questionnaire for Adult Immunization    Are you sick today?   No   Do you have allergies to medications, food, a vaccine component or latex?   No   Have you ever had a serious reaction after receiving a vaccination?   No   Do you have a long-term health problem with heart, lung, kidney, or metabolic disease (e.g., diabetes), asthma, a blood disorder, no spleen, complement component deficiency, a cochlear implant, or a spinal fluid leak?  Are you on long-term aspirin therapy?   No   Do you have cancer, leukemia, HIV/AIDS, or any other immune system problem?   No   Do you have a parent, brother, or sister with an immune system problem?   No   In the past 3 months, have you taken medications that affect  your immune system, such as prednisone, other steroids, or anticancer drugs; drugs for the treatment of rheumatoid arthritis, Crohn s disease, or psoriasis; or have you had radiation treatments?   No   Have you had a seizure, or a brain or other nervous system problem?   No   During the past year, have you received a transfusion of blood or blood    products, or been given immune (gamma) globulin or antiviral drug?   No   For women: Are you pregnant or is there a chance you could become       pregnant during the next month?   No   Have you received any vaccinations in the past 4 weeks?   No     Immunization questionnaire answers were all negative.    I have reviewed the following standing orders:   This patient is due and qualifies for the Influenza vaccine.    Click here for Influenza Vaccine Standing Order    I have reviewed the vaccines inclusion and exclusion criteria; No concerns regarding eligibility.     Patient instructed to remain in clinic for 15 minutes afterwards, and to report any adverse reactions.     Screening performed by  Mat Marie, UZIEL on 10/22/2024 at 1:57 PM.

## 2024-10-23 DIAGNOSIS — C44.42 SQUAMOUS CELL CARCINOMA OF SCALP: Primary | ICD-10-CM

## 2024-10-24 ENCOUNTER — TELEPHONE (OUTPATIENT)
Dept: OTOLARYNGOLOGY | Facility: CLINIC | Age: 84
End: 2024-10-24
Payer: COMMERCIAL

## 2024-10-24 NOTE — TELEPHONE ENCOUNTER
Patient confirmed scheduled appointment:  Date: 11/18/24  Time: 1:40  Visit type: NEW ENT  Provider: Anastacio  [place] [facility] [place]/imaging: PET Scan 11/6  Additional notes:

## 2024-10-25 ENCOUNTER — PATIENT OUTREACH (OUTPATIENT)
Dept: OTOLARYNGOLOGY | Facility: CLINIC | Age: 84
End: 2024-10-25
Payer: COMMERCIAL

## 2024-10-25 NOTE — TELEPHONE ENCOUNTER
Called and spoke to patient and spouse regarding PET scan and appointment with Dr. Chaves in November.     Jackie Smith, RN, BSN  RN Care Coordinator, ENT Clinic

## 2024-10-25 NOTE — TELEPHONE ENCOUNTER
FUTURE VISIT INFORMATION      FUTURE VISIT INFORMATION:  Date: 11/18/24  Time: 1:40 PM  Location: [facility] [place] [facility] [place]  REFERRAL INFORMATION:  Referring provider:    Referring providers clinic:    Reason for visit/diagnosis:  scalp cancer     RECORDS REQUESTED FROM      Clinic name Comments Records Status Imaging Status   [facility] 11/6/24 PET - scheduled    5/17/23 CT head  11/10/22 CTA head neck [facility] PACS   [facility] 8/22/24 OV notes  *additional OV notes scanned  Scanned in    [facility]   10/15/24 WIDE LOCAL EXCISION OF CENTRAL VERTEX SCALP   [facility]  Path # [phone] opt 3  Fax [phone] PATH TISSUE EXAM   10/15/24 Case: [id number]     Track: [id number] CE    Path req 10/25/24    Path received 10/30/24    [facility] Bri Erwin MD    10/1/24, 10/15/24, 10/22/24 OV notes Req 10/25/24    RECEIVED            10/25/2024 9:11 AM: request OV notes from [facility] Mcgovern  ** faxed a request to [facility] for recent derm path reports over last 6 months per MD  ** faxed a request to [facility] for path slides   ** Received OV notes from [facility] and send to scanning.    Pathology Slides Received 10/30/2024 6:53 AM  [id number]    Action 49 slides received from [facility] for 10/15/24 Case: [id number]. Sent to 5th floor pathology lab with consult form.

## 2024-10-31 ENCOUNTER — LAB REQUISITION (OUTPATIENT)
Dept: LAB | Facility: CLINIC | Age: 84
End: 2024-10-31
Payer: COMMERCIAL

## 2024-11-06 ENCOUNTER — HOSPITAL ENCOUNTER (OUTPATIENT)
Dept: PET IMAGING | Facility: CLINIC | Age: 84
Discharge: HOME | End: 2024-11-06
Attending: STUDENT IN AN ORGANIZED HEALTH CARE EDUCATION/TRAINING PROGRAM
Payer: COMMERCIAL

## 2024-11-06 DIAGNOSIS — C44.42 SQUAMOUS CELL CARCINOMA OF SCALP: ICD-10-CM

## 2024-11-06 LAB
CREAT BLD-MCNC: 1.3 MG/DL (ref 0.7–1.3)
EGFRCR SERPLBLD CKD-EPI 2021: 55 ML/MIN/1.73M2

## 2024-11-06 PROCEDURE — 82565 ASSAY OF CREATININE: CPT

## 2024-11-06 PROCEDURE — 74177 CT ABD & PELVIS W/CONTRAST: CPT

## 2024-11-06 PROCEDURE — 71260 CT THORAX DX C+: CPT | Mod: 26 | Performed by: RADIOLOGY

## 2024-11-06 PROCEDURE — 70491 CT SOFT TISSUE NECK W/DYE: CPT | Mod: 26 | Performed by: RADIOLOGY

## 2024-11-06 PROCEDURE — 74177 CT ABD & PELVIS W/CONTRAST: CPT | Mod: 26 | Performed by: RADIOLOGY

## 2024-11-06 PROCEDURE — 70491 CT SOFT TISSUE NECK W/DYE: CPT

## 2024-11-06 PROCEDURE — 78813 PET IMAGE FULL BODY: CPT | Mod: 26 | Performed by: RADIOLOGY

## 2024-11-06 PROCEDURE — A9552 F18 FDG: HCPCS | Performed by: STUDENT IN AN ORGANIZED HEALTH CARE EDUCATION/TRAINING PROGRAM

## 2024-11-06 PROCEDURE — 78816 PET IMAGE W/CT FULL BODY: CPT | Mod: PS

## 2024-11-06 PROCEDURE — 343N000001 HC RX 343 MED OP 636: Performed by: STUDENT IN AN ORGANIZED HEALTH CARE EDUCATION/TRAINING PROGRAM

## 2024-11-06 PROCEDURE — 250N000011 HC RX IP 250 OP 636: Performed by: STUDENT IN AN ORGANIZED HEALTH CARE EDUCATION/TRAINING PROGRAM

## 2024-11-06 RX ORDER — IOPAMIDOL 755 MG/ML
10-135 INJECTION, SOLUTION INTRAVASCULAR ONCE
Status: COMPLETED | OUTPATIENT
Start: 2024-11-06 | End: 2024-11-06

## 2024-11-06 RX ORDER — FLUDEOXYGLUCOSE F 18 200 MCI/ML
10-18 INJECTION, SOLUTION INTRAVENOUS ONCE
Status: COMPLETED | OUTPATIENT
Start: 2024-11-06 | End: 2024-11-06

## 2024-11-06 RX ADMIN — IOPAMIDOL 120 ML: 755 INJECTION, SOLUTION INTRAVENOUS at 14:33

## 2024-11-06 RX ADMIN — FLUDEOXYGLUCOSE F 18 11.74 MILLICURIE: 200 INJECTION, SOLUTION INTRAVENOUS at 13:24

## 2024-11-18 ENCOUNTER — PRE VISIT (OUTPATIENT)
Dept: OTOLARYNGOLOGY | Facility: CLINIC | Age: 84
End: 2024-11-18

## 2024-11-18 ENCOUNTER — PREP FOR PROCEDURE (OUTPATIENT)
Dept: OTOLARYNGOLOGY | Facility: CLINIC | Age: 84
End: 2024-11-18

## 2024-11-18 ENCOUNTER — OFFICE VISIT (OUTPATIENT)
Dept: OTOLARYNGOLOGY | Facility: CLINIC | Age: 84
End: 2024-11-18
Payer: COMMERCIAL

## 2024-11-18 VITALS
DIASTOLIC BLOOD PRESSURE: 75 MMHG | HEART RATE: 76 BPM | SYSTOLIC BLOOD PRESSURE: 145 MMHG | TEMPERATURE: 97.1 F | OXYGEN SATURATION: 97 %

## 2024-11-18 DIAGNOSIS — C44.42 SQUAMOUS CELL CARCINOMA OF SCALP: Primary | ICD-10-CM

## 2024-11-18 ASSESSMENT — PAIN SCALES - GENERAL: PAINLEVEL_OUTOF10: NO PAIN (0)

## 2024-11-18 NOTE — PROGRESS NOTES
Teaching Flowsheet - ENT  Relevant Diagnosis: SCC scalp   Teaching Topic: Wide local excision right scalp, burring of skull and Integra placement   Person(s) involved in teaching: Patient, spouse, and daughter-in-law     Motivation Level: High  Asks Questions: Yes  Eager to Learn: Yes  Cooperative: Yes  Receptive (willing/able to accept information): Yes  Comments: Reviewed pre-op H and P, NPO prior to  surgery, pre-op scrub (will be mailed by surgery schedulers), reviewed post-op cares, activity and pain.     Patient demonstrates understanding of the following:  Reason for the appointment, diagnosis and treatment plan: Yes  Knowledge of proper use of medications and conditions for which they are ordered (with special attention to potential side effects or drug interactions): stop aspirin products 1 week before surgery Yes  Which situations necessitate calling provider and whom to contact: Yes  Nutritional needs and diet plan: Yes  Pain management techniques: Yes  Patient instructed on hand hygiene: Yes  How and/when to access community resources: Yes     Infection Prevention:  Patient demonstrates understanding of the following:  Surgical procedure site care taught: Yes  Signs and symptoms of infection taught: Yes  Wound care taught: Yes  Instructional Materials Used/Given: verbal instruction, AVS with surgery information     Jackie Smith, RN, BSN  RN Care Coordinator, ENT Clinic

## 2024-11-18 NOTE — NURSING NOTE
Chief Complaint   Patient presents with    Consult     Scalp cancer - declined weight      Blood pressure (!) 145/75, pulse 76, temperature 97.1  F (36.2  C), SpO2 97%.    Celestine Whaley LPN

## 2024-11-18 NOTE — PATIENT INSTRUCTIONS
You were seen in the ENT Clinic today by Dr. Chaves. If you have any questions or concerns after your appointment, please contact us (see below)    The following has been recommended for you based upon your appointment today:  Surgery schedulers will call you to schedule your surgery and post op follow up with Dr. Chaves    Please return to clinic 1 week after surgery for post op follow up with Dr. Chaves     How to Contact Us:  Send a [employer]iq message to your provider. Our team will respond to you via [employer]. Occasionally, we will need to call you to get further information.  For urgent matters (Monday-Friday), call the ENT Clinic: [phone] and speak with a call center team member - they will route your call appropriately.   If you'd like to speak directly with a nurse, please find our contact information below. We do our best to check voicemail frequently throughout the day, and will work to call you back within 1-2 days. For urgent matters, please use the general clinic phone numbers listed above.    Jackie TAPIA RN, BSN  RN Care Coordinator, ENT Clinic  [facility]  Direct: [phone]           Surgery Instructions - Dr. Chaves    PREPARING FOR SURGERY  You will need a preoperative assessment within 30 days of your surgery. This can be with your primary care doctor or our PAC (Anesthesia) clinic. If your preoperative assessment is done outside of the [facility] system please have it faxed to #[phone].   Before surgery, call the clinic:   If there is any change in your health, or you are having more frequent or severe symptoms   If you develop a cold or flu, or if you test positive for COVID-19   If you have any questions about what to expect before, during, or after surgery   If you need assistance filling out paperwork for short-term disability or FMLA, or you need a letter excusing you from work       MEDICATIONS BEFORE SURGERY  You will receive specific instructions about  how to take your medications at your preoperative assessment visit. Talk to your care team about every medication that you take, including over-the-counter medications and supplements. Some medications can make you bleed too much during surgery, and some change how well anesthesia drugs work. Follow these general instructions for common medications, unless otherwise directed.     INSTRUCTIONS MEDICATION   Hold for 7 days before surgery  Supplements   Multivitamins   Aspirin (note: some medications can contain aspirin, like [place])  Naproxen (Aleve)  Ibuprofen (Advil, Motrin)  Any weight loss medication      Talk with the Preoperative Assessment Center (PAC) about how to take these medications before surgery    Insulin or oral medications for diabetes   Blood pressure medications   Anticoagulant medications, including:    warfarin (Coumadin)   enoxaparin (Lovenox)   dabigatran (Pradaxa)   apixaban (Eliquis)   rivaroxaban (Xarelto)   Antiplatelet medications, including:   clopidogrel bisulfate (Plavix)   cilostazol (Pletal)      Okay to keep taking for pain, as needed    Acetaminophen (Tylenol)        EATING AND DRINKING BEFORE SURGERY  Eat and drink as usual until 8 hours before your surgery arrival time. After that, no food or milk.   Drink clear liquids until 1 hour before your surgery arrival time. Clear liquids are liquids you can see through, like water, Gatorade, and Propel. You may also have black coffee and tea (no cream or milk).   Nothing by mouth within 1 hour of your surgery arrival time. This includes gum, candy, and breath mints.   If your care team tells you take medicine on the morning of surgery, it is okay to take medicine with a sip of water.   Do not drink alcohol for at least 24 hours before surgery. Do not use marijuana for 7 days prior to surgery.      PREVENTING INFECTION  Shower or bathe the night before and morning of your surgery following the instructions on your handout,  Showering  Before Surgery.    Note: Only use the special antiseptic soap from your neck to your toes. Your care team will clean the skin at your surgery site.   Don t shave or clip hair near your surgery site. We ll remove the hair if needed.   Having diabetes and/or smoking can cause delayed wound healing and increase your risk of a surgical site infection. Quitting smoking and lowering your blood sugars can make a big difference. If you would like support with this, please let us know or work with your primary care provider.        SMOKING AND NICOTINE  Don t smoke or vape the morning of surgery. You may chew nicotine gum up to 2 hours before surgery. A nicotine patch is okay.   We strongly recommend quitting smoking and other tobacco products. Nicotine can cause delayed healing and wound complications after surgery.       PLANNING AHEAD - FINANCES  [email or web address]  [facility] Billing: Please call [phone], if you wish to pay a bill.  [facility] Financial Counselors: Please call [phone], if you have questions about possible costs and coverage or to discuss options if you don't have enough - or no - insurance for your care.  [facility] Cost of Care Estimates: Please visit the website to view our online estimate tool. If you have additional questions, call [phone] for estimates.  Our financial team will contact your insurance company as soon as surgery is scheduled. If your insurance company requires a prior authorization (pre-approval), our financial team will complete these necessary steps. Typically, we don t encounter many issues with insurance denying coverage for skull base surgery.    It is a good idea to call your insurance company and let them know you re having surgery. Ask questions about your deductible, co-insurance, and what of out-of-pocket costs you will be responsible for.       HEALTHCARE DIRECTIVE  Consider preparing a  Health Care Directive and choosing a Health Care Agent. A Health Care Directive is a written plan outlining your values and priorities for your future medical treatment. A Health Care Agent makes health care decisions based on your wishes if you are unable to communicate.   Download a document, information materials or register for a free class on advance care planning and creating a health care directive by visiting [email or web address], calling [phone], or emailing [email or web address].   If you have a health care directive or choose to complete a healthcare directive before surgery, please upload the document to [employer]. This will be attached to your chart. You may also want to bring a copy with you on the day of surgery.       YOUR SURGERY DAY  Parking:   Both self-park and  parking are available at the [facility] and [facility] buildings. If the Patient Visitors Ramp is full or if a patient or visitor has limited mobility, please follow the signs to the  located at the main entrance. For more information, please visit: [email or web address]    What to bring:  Photo ID and insurance card   Copy of your healthcare directive (if you have one)   Glasses with case (you can t wear contacts during surgery)   If you have a pacemaker, ICD (defibrillator) or other implant, please bring the ID card   If you have an implanted stimulator, please bring the remote control   If you have a legal guardian, bring a copy of the certified (court-stamped) guardianship papers   What to leave at home:  Please remove any jewelry, including body piercings   Leave jewelry and other valuables at home        HOME RECOVERY  Everyone's recovery is different based on their health condition, age, and overall health status.   Plan to have an adult stay with you for at least 24 hours after you get home from the hospital.   Arrange for help at home. It is common to feel  tired for the first few weeks (maybe months) and you will need to avoid some activities. Many people find that having someone help with household tasks, such as cleaning, cooking, and running errands is helpful.    Make your home safe by removing tripping hazards and moving items you need to a place where you can easily reach them.        ACTIVITY RESTRICTIONS  Avoid strenuous exercise and activity for 3-4 weeks.   Do not lift anything greater than 10 pounds (about a gallon of milk).      PAIN MANAGEMENT  It is normal to have some pain after surgery. Most people do not experience severe pain. If you are experiencing severe pain at the incision site or severe headaches, please let us know right away.      CALL THE CLINIC IF YOU EXPERIENCE:  Drainage, swelling, fluid collection, or increased redness around the incision   Fever, or temperature of 101 degrees or higher   Pain not managed by your pain medication   Severe constipation or abdominal pain  Running low on prescription medication that was prescribed to you at the time of surgery   Any other symptoms that you have questions about      After clinic hours or on the weekends, please call the hospital  at [phone] and ask to speak with the ENT resident on call. If you have a serious emergency, call 911 or come to the emergency room. If you can, come to the hospital where you had your surgery.     During clinic hours:   Department  Phone    Ear, Nose, & Throat (ENT)    [phone]     RN Care Coordinators:  We do our best to check voicemail frequently throughout the day. For urgent matters, please use the general clinic phone number listed above. Your call will be routed appropriately.     Jackie TAPIA RN, BSN   RN Care Coordinator ENT    Direct: [phone]

## 2024-11-18 NOTE — LETTER
11/18/2024       RE: Michael Maldonado  [address]  [place] [address]     Dear Colleague,    Thank you for referring your patient, Michael Maldonado, to the [facility] [place] at [facility]. Please see a copy of my visit note below.    Head and Neck Surgery  November 18, 2024    I the pleasure meeting Mr. Maldonado and his family today in clinic.    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     He was referred to Dr. Bri Hummel who is a facial plastic surgeon at [facility].  She performed excision from 2 scalp lesions and a biopsy of a scalp lesion.  The vertex scalp lesion was resected to negative margin and has been left to heal secondarily.  2 right sided scalp lesions are present. A biopsy was performed on the right posterior scalp lesion which shows SCC. The right anterior scalp lesion was resected but there are positive margins.     He underwent a PET scan last week showing no evidence of regional or distant metastases.    He previously underwent a right parotidectomy.  He cannot tell me the pathology.  He thinks he may have had radiation afterwards.  This was over 15 years ago    Past medical history:  Hypertension  Hyperlipidemia  BPH  Stroke  DVT    Past surgical history:  Craniotomy  Knee surgery  Right parotidectomy    Medications:  Amlodipine  Aspirin  Keppra  Levothyroxine  Losartan  Meprazole  Simvastatin    Allergies:  No known drug allergies    Social history:  Former smoker  Occasional alcohol use    Family history:  None    Physical examination:  Alert in no acute distress  No palpable parotid or neck masses  No lesions seen in the oral cavity or oropharynx  2 cm area of granulation on the vertex scalp  1.5 cm exophytic ulcerative lesion of right posterior scalp.  This is mobile over the skull  Healing incision of right anterior scalp.   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  There are 2 areas that need additional surgery.  The first is a right anterior scalp lesion that needs additional resection to clear the margins.  A right posterior scalp lesion has just been biopsied and needs definitive surgical management.  I recommended proceeding with wide local excision down to the skull, burring of the outer table, Integra placement.    We will review his imaging under multidisciplinary head neck conference.    Lucas Chaves MD    60 minutes spent on the date of the encounter in chart review, patient visit, review of tests, documentation and/or discussion with other providers about the issues documented above.     Again, thank you for allowing me to participate in the care of your patient.      Sincerely,    Lucas Chaves MD

## 2024-11-20 LAB
PATH REPORT.COMMENTS IMP SPEC: ABNORMAL
PATH REPORT.COMMENTS IMP SPEC: YES
PATH REPORT.FINAL DX SPEC: ABNORMAL
PATH REPORT.GROSS SPEC: ABNORMAL
PATH REPORT.MICROSCOPIC SPEC OTHER STN: ABNORMAL
PATH REPORT.RELEVANT HX SPEC: ABNORMAL
PATH REPORT.RELEVANT HX SPEC: ABNORMAL
PATH REPORT.SITE OF ORIGIN SPEC: ABNORMAL

## 2024-11-21 ENCOUNTER — TELEPHONE (OUTPATIENT)
Dept: OTOLARYNGOLOGY | Facility: CLINIC | Age: 84
End: 2024-11-21
Payer: COMMERCIAL

## 2024-11-21 NOTE — TELEPHONE ENCOUNTER
Scheduled surgery with Dr. Chaves on 12/3/2024    Spoke with: Patient    Surgery is located at [facility]    Patient will be seen for their H&P by their PCP Dr. Caballero within 30 days of surgery - Confirmed PCP on file is up to date     Does patient need a consult before upcoming surgery? No    Anesthesia type: General    Requested Imaging required for surgery: No    Patient is scheduled for their 1 week post op on 12/9/2024 at 820am with Dr. Chaves    Patient will receive their surgery packet & surgical soap via mail per their preference - Confirmed address on file is up to date    Patient was not provided a start time for surgery & is aware they will receive this information 3-5 days before surgery    Additional comments: Patient was instructed to call back with any further questions or concerns.     Rebecca Patton on 11/21/2024 at 1:46 PM

## 2024-11-21 NOTE — PROGRESS NOTES
Head and Neck Surgery  November 18, 2024    I the pleasure meeting Mr. Maldonado and his family today in clinic.    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     He was referred to Dr. Bri Hummel who is a facial plastic surgeon at [facility].  She performed excision from 2 scalp lesions and a biopsy of a scalp lesion.  The vertex scalp lesion was resected to negative margin and has been left to heal secondarily.  2 right sided scalp lesions are present. A biopsy was performed on the right posterior scalp lesion which shows SCC. The right anterior scalp lesion was resected but there are positive margins.     He underwent a PET scan last week showing no evidence of regional or distant metastases.    He previously underwent a right parotidectomy.  He cannot tell me the pathology.  He thinks he may have had radiation afterwards.  This was over 15 years ago    Past medical history:  Hypertension  Hyperlipidemia  BPH  Stroke  DVT    Past surgical history:  Craniotomy  Knee surgery  Right parotidectomy    Medications:  Amlodipine  Aspirin  Keppra  Levothyroxine  Losartan  Meprazole  Simvastatin    Allergies:  No known drug allergies    Social history:  Former smoker  Occasional alcohol use    Family history:  None    Physical examination:  Alert in no acute distress  No palpable parotid or neck masses  No lesions seen in the oral cavity or oropharynx  2 cm area of granulation on the vertex scalp  1.5 cm exophytic ulcerative lesion of right posterior scalp.  This is mobile over the skull  Healing incision of right anterior scalp.  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  There are 2 areas that need additional surgery.  The first is a right anterior scalp lesion that needs additional resection to clear the margins.  A right posterior scalp lesion has just been  biopsied and needs definitive surgical management.  I recommended proceeding with wide local excision down to the skull, burring of the outer table, Integra placement.    We will review his imaging under multidisciplinary head neck conference.    Lucas Chaves MD    60 minutes spent on the date of the encounter in chart review, patient visit, review of tests, documentation and/or discussion with other providers about the issues documented above.

## 2024-11-26 ENCOUNTER — PATIENT OUTREACH (OUTPATIENT)
Dept: OTOLARYNGOLOGY | Facility: CLINIC | Age: 84
End: 2024-11-26
Payer: COMMERCIAL

## 2024-11-26 NOTE — PROGRESS NOTES
Called and spoke to patient and spouse regarding surgery next week and no pre-op competed at this time. Patient's spouse just got out on the hospital and typically helps him with these appointments. Due to her current medical problems they would like to reschedule patient's surgery with Dr. Chaves to a time when she can assist patient with appointments and post op cares. Message sent to surgery scheduling to assist with this.     Jackie Smith, RN, BSN  RN Care Coordinator, ENT Clinic

## 2024-12-01 DIAGNOSIS — E78.2 MIXED HYPERLIPIDEMIA: ICD-10-CM

## 2024-12-02 RX ORDER — SIMVASTATIN 80 MG
40 TABLET ORAL DAILY
Qty: 45 TABLET | Refills: 2 | Status: SHIPPED | OUTPATIENT
Start: 2024-12-02

## 2025-01-09 ENCOUNTER — TRANSFERRED RECORDS (OUTPATIENT)
Dept: HEALTH INFORMATION MANAGEMENT | Facility: CLINIC | Age: 85
End: 2025-01-09
Payer: COMMERCIAL

## 2025-01-14 ENCOUNTER — OFFICE VISIT (OUTPATIENT)
Dept: INTERNAL MEDICINE | Facility: CLINIC | Age: 85
End: 2025-01-14
Payer: COMMERCIAL

## 2025-01-14 VITALS
BODY MASS INDEX: 28.49 KG/M2 | DIASTOLIC BLOOD PRESSURE: 66 MMHG | HEIGHT: 70 IN | SYSTOLIC BLOOD PRESSURE: 128 MMHG | OXYGEN SATURATION: 97 % | RESPIRATION RATE: 16 BRPM | WEIGHT: 199 LBS | HEART RATE: 65 BPM | TEMPERATURE: 97.8 F

## 2025-01-14 DIAGNOSIS — Z01.818 PREOP GENERAL PHYSICAL EXAM: Primary | ICD-10-CM

## 2025-01-14 DIAGNOSIS — C44.42 SQUAMOUS CELL CARCINOMA OF SCALP: ICD-10-CM

## 2025-01-14 DIAGNOSIS — E03.4 HYPOTHYROIDISM DUE TO ACQUIRED ATROPHY OF THYROID: ICD-10-CM

## 2025-01-14 LAB
ANION GAP SERPL CALCULATED.3IONS-SCNC: 10 MMOL/L (ref 7–15)
BUN SERPL-MCNC: 16.6 MG/DL (ref 8–23)
CALCIUM SERPL-MCNC: 9.8 MG/DL (ref 8.8–10.4)
CHLORIDE SERPL-SCNC: 105 MMOL/L (ref 98–107)
CREAT SERPL-MCNC: 1.15 MG/DL (ref 0.67–1.17)
EGFRCR SERPLBLD CKD-EPI 2021: 63 ML/MIN/1.73M2
ERYTHROCYTE [DISTWIDTH] IN BLOOD BY AUTOMATED COUNT: 13.8 % (ref 10–15)
GLUCOSE SERPL-MCNC: 86 MG/DL (ref 70–99)
HCO3 SERPL-SCNC: 27 MMOL/L (ref 22–29)
HCT VFR BLD AUTO: 46.1 % (ref 40–53)
HGB BLD-MCNC: 15.2 G/DL (ref 13.3–17.7)
MCH RBC QN AUTO: 31 PG (ref 26.5–33)
MCHC RBC AUTO-ENTMCNC: 33 G/DL (ref 31.5–36.5)
MCV RBC AUTO: 94 FL (ref 78–100)
PLATELET # BLD AUTO: 128 10E3/UL (ref 150–450)
POTASSIUM SERPL-SCNC: 4.3 MMOL/L (ref 3.4–5.3)
RBC # BLD AUTO: 4.91 10E6/UL (ref 4.4–5.9)
SODIUM SERPL-SCNC: 142 MMOL/L (ref 135–145)
WBC # BLD AUTO: 4.5 10E3/UL (ref 4–11)

## 2025-01-14 PROCEDURE — G2211 COMPLEX E/M VISIT ADD ON: HCPCS | Performed by: INTERNAL MEDICINE

## 2025-01-14 PROCEDURE — 85027 COMPLETE CBC AUTOMATED: CPT | Performed by: INTERNAL MEDICINE

## 2025-01-14 PROCEDURE — 99214 OFFICE O/P EST MOD 30 MIN: CPT | Performed by: INTERNAL MEDICINE

## 2025-01-14 PROCEDURE — 36415 COLL VENOUS BLD VENIPUNCTURE: CPT | Performed by: INTERNAL MEDICINE

## 2025-01-14 PROCEDURE — 80048 BASIC METABOLIC PNL TOTAL CA: CPT | Performed by: INTERNAL MEDICINE

## 2025-01-14 RX ORDER — LEVOTHYROXINE SODIUM 88 UG/1
88 TABLET ORAL DAILY
Qty: 90 TABLET | Refills: 3 | Status: SHIPPED | OUTPATIENT
Start: 2025-01-14

## 2025-01-14 NOTE — PATIENT INSTRUCTIONS
Satisfactory preoperative medical examination in anticipation of surgery on Michael's scalp anticipated to be a wide local excision, and expander placement, because of invasive squamous cell carcinoma, for which they will be Michael underwent initial excision October 15, 2024, but ended up with positive margins    Surgery scheduled for next Tuesday, January 21, 2025 at [facility] (not the outpatient surgery center)    Medically Michael is been very stable.  Blood pressure looks fine.    I do want Michael to stop by the laboratory this afternoon so we can check a CBC and basic metabolic panel.  10-8-2024  Sinus rhythm with 1st degree A-V block   Cannot rule out Inferior infarct , age undetermined   Abnormal ECG   When compared with ECG of 17-May-2023 18:30,   Minimal criteria for Inferior infarct are now Present   Confirmed by BRENTON BARTLETT MD LOC:WW ([id number]) on 10/8/2024 3:58:28 PM     Regarding Michael's medications,     STOP ASPIRIN as of today January 14, okay to resume after surgery is done    I told him that on Monday the 20th which is the day before surgery, he should take all of his usual medications per routine.    On the morning of surgery January 21 Tuesday, take his morning dose of levothyroxine  And his amlodipine blood pressure medicine and morning dose of seizure medicine Keppra (levETIRAcetam)    Later that day of the 21st, take the evening dose of seizure medicine Keppra (levETIRAcetam)  Take that day's doses of losartan, omeprazole, and simvastatin.    The day after surgery, meaning January 22 Wednesday, he will be back on all of his usual medicines.    His surgical team wants Michael to take 2 showers, including use of the antibacterial soap cleanser Hibiclens  I told Michael to do this Monday evening, and also early Tuesday morning the 21st, which is the day of his surgery    Today January 14, 2025, I told Michael and Barbie that going forward, I want them to bring at least one of  their adult children with him to each and every appointment.  This is to ensure that there is another set of years they can hear the days instructions, because Michael and Barbie easily get confused    Recommendation  Approval given to proceed with proposed procedure, without further diagnostic evaluation.

## 2025-01-14 NOTE — PROGRESS NOTES
Preoperative Evaluation  [facility]  [address] [facility] [address]  Phone: [phone]  Fax: [phone]  Primary Provider: LUL SHAFFER MD  Pre-op Performing Provider: LUL SHAFFER MD  Jan 14, 2025 1/14/2025   Surgical Information   What procedure is being done? Wide Local Excision Right Scalp, Burring of Skull and Integra Placement   Facility or Hospital where procedure/surgery will be performed: [facility]   Who is doing the procedure / surgery? Dr. Chaves   Date of surgery / procedure: 1/21/25   Time of surgery / procedure: TBD   Where do you plan to recover after surgery? at home with family     Fax number for surgical facility: Note does not need to be faxed, will be available electronically in Epic.    Assessment & Plan     The proposed surgical procedure is considered LOW risk.    Satisfactory preoperative medical examination in anticipation of surgery on Michael's scalp anticipated to be a wide local excision, and expander placement, because of invasive squamous cell carcinoma, for which they will be Michael underwent initial excision October 15, 2024, but ended up with positive margins    Surgery scheduled for next Tuesday, January 21, 2025 at [facility] (not the outpatient surgery center)    Medically Michael is been very stable.  Blood pressure looks fine.    I do want Michael to stop by the laboratory this afternoon so we can check a CBC and basic metabolic panel.  LATER: Basic metabolic panel normal.  CBC normal except for slightly reduced platelet count 1 28K, similar to his baseline.  Okay to proceed with surgery.    10-8-2024  Sinus rhythm with 1st degree A-V block   Cannot rule out Inferior infarct , age undetermined   Abnormal ECG   When compared with ECG of 17-May-2023 18:30,   Minimal criteria for Inferior infarct are now Present   Confirmed by BRENTON BARTLETT MD LOC:[facility] ([id number]) on  10/8/2024 3:58:28 PM     Regarding Michael's medications,     STOP ASPIRIN as of today January 14, okay to resume after surgery is done    I told him that on Monday the 20th which is the day before surgery, he should take all of his usual medications per routine.    On the morning of surgery January 21 Tuesday, take his morning dose of levothyroxine  And his amlodipine blood pressure medicine and morning dose of seizure medicine Keppra (levETIRAcetam)    Later that day of the 21st, take the evening dose of seizure medicine Keppra (levETIRAcetam)  Take that day's doses of losartan, omeprazole, and simvastatin.    The day after surgery, meaning January 22 Wednesday, he will be back on all of his usual medicines.    His surgical team wants Michael to take 2 showers, including use of the antibacterial soap cleanser Hibiclens  I told Michael to do this Monday evening, and also early Tuesday morning the 21st, which is the day of his surgery    Today January 14, 2025, I told Michael and Barbie that going forward, I want them to bring at least one of their adult children with him to each and every appointment.  This is to ensure that there is another set of years they can hear the days instructions, because Michael and Barbie easily get confused    Recommendation  Approval given to proceed with proposed procedure, without further diagnostic evaluation.      Subjective   Michael is a 84 year old, presenting for the following:  Pre-Op Exam (Wide Local Excision Right Scalp, Burring of Skull and Integra Placement with Dr. Chaves at [facility] on 1/21/25)          1/14/2025     3:30 PM   Additional Questions   Roomed by Lelo MAK related to upcoming procedure:     Scalp cutaneous squamous cell carcinoma  Dr. Bri Hummel is a facial plastic surgeon at [facility] performed excision from 2 scalp lesions and a biopsy of a scalp lesion.  The vertex scalp lesion was resected to negative margin and has been left to heal  secondarily.  2 right sided scalp lesions are present. A biopsy was performed on the right posterior scalp lesion which shows SCC. The right anterior scalp lesion was resected but there are positive margins.     Hypothyroidism on replacement, on levothyroxine 88 mcg a day  Elevated TSH January 2024  I reminded Michael of the importance of taping taking his levothyroxine first thing in the morning on empty stomach, no other food or medication for 30 minutes.  This is to ensure proper absorption.     His wife Barbie is going to go through all of Michael's pill bottles to make sure they match today's medication list     1-  TSH  0.30 - 4.20 uIU/mL 7.54 High       Actinic keratoses on face and scalp, for which Michael finished up 4 days of topical 5-fluorouracil with calcipotriene, which was prescribed by [facility] [place], as an alternative to Mohs surgery     Lumbar facet arthropathy and back pain- back doing OK as of January 2024  Michael was working with Dr. Curry at [facility]  Michael underwent a diagnostic facet nerve injection mid July 2023, and he had a good response.    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  Does NOT need to take antibiotics prior to routine dental procedures such as cleanings  Michael has been in the habit of taking amoxicillin prior to dental procedures, because of his artificial joints.  I told Michael that those implants are mature, and according to guidelines from the American Academy of Orthopedic Surgery, he does not need to take antibiotics anymore only for prophylaxis because of artificial joint.     If he needs to be on antibiotics because of the dental procedure itself, for example if he has an abscessed tooth, that is a different matter.  Before routine dental cleanings, no antibiotics are  needed.     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 Finch of [facility] neurology.     Michael is tolerating that medication just fine.  He will be following up with Dr. Finch, and possibly may be able to resume driving, assuming he remains seizure-free.      History of craniotomy for an arteriovenous malformation in 1970, with good long-term recovery     Atherosclerotic deposits in both carotid siphons and right carotid bulb/bifurcation with mild associated luminal narrowing, and plaque in the proximal right vertebral artery, milder atherosclerotic plaque left vertebral artery, by CT angiography November 10, 2022     Michael takes high-dose simvastatin and also DAILY baby aspirin, and has good blood pressure control.     He has undergone a very thorough work-up that started in the emergency department on November 10, 2022, with additional testing orchestrated by Dr. Finch     HEAD CTA:   1. No finding for vascular cutoff, aneurysm, or flow-limiting stenosis.  2. Coarse atherosclerotic plaque is seen within both carotid siphons without significant associated luminal stenosis.  NECK CTA:  1.  Moderate atherosclerotic plaque right carotid bulb/bifurcation with mild associated luminal narrowing.  2.  Coarse atherosclerotic plaque is seen within the proximal right vertebral artery with more mild atherosclerotic plaque on the left. There is moderate or high-grade narrowing of the proximal right vertebral artery which, where visualized, is similar to the 1/22/2019 CTA chest.     11- EEG  Impression: This is an abnormal EEG due to asymmetric slowing of the background that is maximally expressed on the left  hemisphere.  Asymmetry of the amplitude likely is due to breach effect.  Additionally polyspike discharges are noted in the left parasagittal head region with phase reversal at C3 that suggest a low threshold for seizures.     Classification: Dysrhythmia grade III left parasagittal (C3)                            Dysrhythmia grade II generalized maximum left                            Breach effect     12- echo  The left ventricle is normal in size with mild concentric left ventricular  hypertrophy.  Left ventricular function is normal.The ejection fraction is 60-65%.  Normal right ventricle size and systolic function.  A contrast injection (Bubble Study) was performed that was negative for flow  across the interatrial septum.  Mild age-related valve disease is present without significant stenosis or  regurgitation.  There is no comparison study available.     Left shoulder rotator cuff syndrome, had steroid injection in 2 spots at his left shoulder at [facility] Monday, January 23     Right sided eye surgery to correct strabismus April 15, 2022     Overweight  with body mass index of 29.3  has reduced the alcohol consumption, instead of martinis, he will have a small glass of rafael     Wt Readings from Last 5 Encounters:   01/14/25 90.3 kg (199 lb)   10/08/24 88.9 kg (196 lb)   07/25/24 89.8 kg (198 lb)   02/05/24 92.1 kg (203 lb)   01/30/24 92.5 kg (204 lb)     Evidence of cognitive and memory impairment, had trouble recalling his medications, and exhibited difficulty understanding the written instructions given to him by the surgery department    Short-term Memory impairment-- longstanding  History of craniotomy for an arteriovenous malformation in 1970, with good long-term recovery.    Could not recall 3 objects today 1-     Essential hypertension, reasonably well controlled on combination of losartan plus amlodipine.  We will keep his blood pressure medicines the same.   BP Readings from  Last 6 Encounters:   01/14/25 128/66   11/18/24 (!) 145/75   10/08/24 120/56   07/25/24 124/68   02/05/24 117/67   01/30/24 114/74     Hyperlipidemia on high-dose simvastatin, lipids reasonably well controlled  Carotid and vertebral artery atherosclerosis  Cholesterol panel from January 21, 2022 showed good control with total cholesterol 159, triglycerides 96, HDL 55, LDL 85.     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         History of deep venous thrombosis associated with the 2019 knee arthroplasty, no longer needs to be on anticoagulation.       History of likely COVID with cold symptoms, week of January 10, 2022, lasted a few days, then his wife tested positive          1/14/2025   Pre-Op Questionnaire   Have you ever had a heart attack or stroke? No   Have you ever had surgery on your heart or blood vessels, such as a stent placement, a coronary artery bypass, or surgery on an artery in your head, neck, heart, or legs? No   Do you have chest pain with activity? No   Do you have a history of heart failure? No   Do you currently have a cold, bronchitis or symptoms of other infection? No   Do you have a cough, shortness of breath, or wheezing? No   Do you or anyone in your family have previous history of blood clots? No   Do you or does anyone in your family have a serious bleeding problem such as prolonged bleeding following surgeries or cuts? No   Have you ever had problems with anemia or been told to take iron pills? No   Have you had any abnormal blood loss such as black, tarry or bloody stools? No   Have you ever had a blood transfusion? (!) YES   Have you ever had a transfusion reaction? No   Are you willing to have a blood transfusion if it is medically needed before, during, or after your surgery? Yes   Have you or any of your  relatives ever had problems with anesthesia? No   Do you have sleep apnea, excessive snoring or daytime drowsiness? No   Do you have any artifical heart valves or other implanted medical devices like a pacemaker, defibrillator, or continuous glucose monitor? No   Do you have artificial joints? (!) YES   Are you allergic to latex? 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) 01/25/2023     Priority: Medium    Carotid atherosclerosis, bilateral 01/25/2023     Priority: Medium    Atherosclerosis of vertebral artery 01/25/2023     Priority: Medium    Cerebral arteriovenous malformation 11/15/2022     Priority: Medium     Dec 15, 2020 Entered By: ART RASMUSSEN JR Comment: AVM in brain - had hemorrhaged 1969 - surgery 1969 [facility]-&gt; since no problems -exam craniotomyDec 15, 2020 Entered By: ART RASMUSSEN JR Comment: [facility]      Hypothyroidism 11/15/2022     Priority: Medium     Dec 15, 2020 Entered By: ART RASMUSSEN JR Comment: hypothyroidism - on levothyroxine for a while decent control.      History of head and neck cancer 11/12/2020     Priority: Medium    Status post total knee replacement, right 01/07/2019     Priority: Medium    Hypothyroidism due to acquired atrophy of thyroid 04/11/2018     Priority: Medium    Hypertension 04/25/2017     Priority: Medium    Hyperlipidemia 04/12/2016     Priority: Medium    Leukopenia 11/17/2010     Priority: Medium    Cancer of parotid gland (H) 10/08/2010     Priority: Medium     Dec 15, 2020 Entered By: ART RASMUSSEN JR Comment: r sq cell cancer r neck - patient says parotide 2011 - then radiation        Past Medical History:   Diagnosis Date    BPH (benign prostatic hyperplasia)     Head and neck cancer (H) 4/12/2016    History of deep vein thrombophlebitis of lower extremity 11/9/2018    Hyperlipidemia     Hypertension     Leukopenia      "mild    Osteoarthritis     Stroke (H)      Past Surgical History:   Procedure Laterality Date    CRANIOTOMY FOR AVM  1970    clipped    JOINT REPLACEMENT      SALIVARY GLAND SURGERY Right 2011    squamous cell cancer grade 3    TOTAL KNEE ARTHROPLASTY Left     ZZC TOTAL KNEE ARTHROPLASTY Right 2019    Procedure: RIGHT TOTAL KNEE  ARTHROPLASTY;  Surgeon: Darrel Page DO;  Location: [facility] OR;  Service: Orthopedics     Current Outpatient Medications   Medication Sig Dispense Refill    amLODIPine (NORVASC) 5 MG tablet TAKE 1 TABLET BY MOUTH EVERY DAY 90 tablet 1    aspirin (ASA) 81 MG EC tablet Take 1 tablet by mouth every other day      fluorouracil (EFUDEX) 5 % external cream APPLY 1 APPLICATION TOPICALLY TO SCALP AND FOREHEAD      levETIRAcetam (KEPPRA) 500 MG tablet Take 1 tablet (500 mg) by mouth 2 times daily 180 tablet 3    levothyroxine (SYNTHROID/LEVOTHROID) 88 MCG tablet Take 1 tablet (88 mcg) by mouth daily 90 tablet 3    losartan (COZAAR) 50 MG tablet Take 1 tablet (50 mg) by mouth daily. 90 tablet 3    omeprazole (PRILOSEC) 20 MG DR capsule TAKE 1 CAPSULE BY MOUTH TWICE DAILY BEFORE MEALS 180 capsule 2    simvastatin (ZOCOR) 80 MG tablet TAKE 1/2 TABLET BY MOUTH EVERY DAY 45 tablet 2       No Known Allergies     Social History     Tobacco Use    Smoking status: Former     Types: Cigars     Quit date: 2000     Years since quittin.0     Passive exposure: Never    Smokeless tobacco: Never   Substance Use Topics    Alcohol use: Yes     Alcohol/week: 1.0 standard drink of alcohol     Comment: Alcoholic Drinks/day: nightly       History   Drug Use No       Review of Systems  Constitutional, HEENT, cardiovascular, pulmonary, gi and gu systems are negative, except as otherwise noted.    Objective    /66 (BP Location: Right arm, Patient Position: Sitting, Cuff Size: Adult Regular)   Pulse 65   Temp 97.8  F (36.6  C)   Resp 16   Ht 1.778 m (5' 10\")   Wt 90.3 kg (199 lb)   SpO2 " "97%   BMI 28.55 kg/m     Estimated body mass index is 28.55 kg/m  as calculated from the following:    Height as of this encounter: 1.778 m (5' 10\").    Weight as of this encounter: 90.3 kg (199 lb).  Physical Exam    General: Alert, in no distress  Skin: + Thick scaly lesions on scalp, that would be consistent with squamous cell carcinoma  Eyes/nose/throat: Eyes without scleral icterus, eye movements normal, pupils equal and reactive, oropharynx clear  MSK: Neck with good ROM  Pulm: Lungs clear to auscultation bilaterally  Cardiac: Heart with regular rate and rhythm, no murmur or gallop  GI: Abdomen soft, nontender.   MSK: Extremities no tenderness or edema  Neuro: Moves all extremities, without focal weakness  Psych:  Normal affect and speech  + Evidence of cognitive and memory impairment, had trouble recalling his medications, and exhibited difficulty understanding the written instructions given to him by the surgery department    Recent Labs   Lab Test 11/06/24  1322 10/08/24  1051 02/05/24  1108 01/30/24  1002   HGB  --   --   --  15.2   PLT  --   --   --  129*   NA  --   --  143 140   POTASSIUM  --  4.2 4.9 4.2   CR 1.3  --   --  1.18*        Diagnostics  Recent Results (from the past 48 hours)   CBC with platelets    Collection Time: 01/14/25  4:37 PM   Result Value Ref Range    WBC Count 4.5 4.0 - 11.0 10e3/uL    RBC Count 4.91 4.40 - 5.90 10e6/uL    Hemoglobin 15.2 13.3 - 17.7 g/dL    Hematocrit 46.1 40.0 - 53.0 %    MCV 94 78 - 100 fL    MCH 31.0 26.5 - 33.0 pg    MCHC 33.0 31.5 - 36.5 g/dL    RDW 13.8 10.0 - 15.0 %    Platelet Count 128 (L) 150 - 450 10e3/uL   Basic metabolic panel  (Ca, Cl, CO2, Creat, Gluc, K, Na, BUN)    Collection Time: 01/14/25  4:37 PM   Result Value Ref Range    Sodium 142 135 - 145 mmol/L    Potassium 4.3 3.4 - 5.3 mmol/L    Chloride 105 98 - 107 mmol/L    Carbon Dioxide (CO2) 27 22 - 29 mmol/L    Anion Gap 10 7 - 15 mmol/L    Urea Nitrogen 16.6 8.0 - 23.0 mg/dL    Creatinine " 1.15 0.67 - 1.17 mg/dL    GFR Estimate 63 >60 mL/min/1.73m2    Calcium 9.8 8.8 - 10.4 mg/dL    Glucose 86 70 - 99 mg/dL          Revised Cardiac Risk Index (RCRI)  The patient has the following serious cardiovascular risks for perioperative complications:   - No serious cardiac risks = 0 points     RCRI Interpretation: 0 points: Class I (very low risk - 0.4% complication rate)    I spent 40 minutes on this encounter, including reviewing interval history since last visit, examining the patient, explaining and counseling the issues enumerated in the Assessment and Plan (patient given a copy), ordering indicated tests  Additional time was needed to explain to him the instructions given to him from the surgery clinic, which he had trouble understanding because of cognitive impairment.    The longitudinal plan of care for the diagnosis(es)/condition(s) as documented were addressed during this visit. Due to the added complexity in care, I will continue to support Michael in the subsequent management and with ongoing continuity of care.       Signed Electronically by: LUL SHAFFER MD  A copy of this evaluation report is provided to the requesting physician.

## 2025-01-20 ENCOUNTER — ANESTHESIA EVENT (OUTPATIENT)
Dept: SURGERY | Facility: CLINIC | Age: 85
End: 2025-01-20
Payer: COMMERCIAL

## 2025-01-20 ENCOUNTER — PREP FOR PROCEDURE (OUTPATIENT)
Dept: OTOLARYNGOLOGY | Facility: CLINIC | Age: 85
End: 2025-01-20
Payer: COMMERCIAL

## 2025-01-21 ENCOUNTER — ANESTHESIA (OUTPATIENT)
Dept: SURGERY | Facility: CLINIC | Age: 85
End: 2025-01-21
Payer: COMMERCIAL

## 2025-01-21 ENCOUNTER — HOSPITAL ENCOUNTER (OUTPATIENT)
Facility: CLINIC | Age: 85
Discharge: HOME | End: 2025-01-21
Attending: STUDENT IN AN ORGANIZED HEALTH CARE EDUCATION/TRAINING PROGRAM | Admitting: STUDENT IN AN ORGANIZED HEALTH CARE EDUCATION/TRAINING PROGRAM
Payer: COMMERCIAL

## 2025-01-21 VITALS
BODY MASS INDEX: 29.16 KG/M2 | DIASTOLIC BLOOD PRESSURE: 80 MMHG | HEIGHT: 69 IN | RESPIRATION RATE: 16 BRPM | SYSTOLIC BLOOD PRESSURE: 145 MMHG | HEART RATE: 69 BPM | WEIGHT: 196.87 LBS | OXYGEN SATURATION: 95 % | TEMPERATURE: 97.6 F

## 2025-01-21 DIAGNOSIS — C44.42 SQUAMOUS CELL CARCINOMA OF SCALP: Primary | ICD-10-CM

## 2025-01-21 LAB
GLUCOSE BLDC GLUCOMTR-MCNC: 106 MG/DL (ref 70–99)
PATH REPORT.COMMENTS IMP SPEC: NORMAL
PATH REPORT.INTRAOP OBS SPEC DOC: NORMAL

## 2025-01-21 PROCEDURE — 11626 EXC S/N/H/F/G MAL+MRG >4 CM: CPT | Mod: GC | Performed by: STUDENT IN AN ORGANIZED HEALTH CARE EDUCATION/TRAINING PROGRAM

## 2025-01-21 PROCEDURE — 250N000011 HC RX IP 250 OP 636: Performed by: STUDENT IN AN ORGANIZED HEALTH CARE EDUCATION/TRAINING PROGRAM

## 2025-01-21 PROCEDURE — 250N000025 HC SEVOFLURANE, PER MIN: Performed by: STUDENT IN AN ORGANIZED HEALTH CARE EDUCATION/TRAINING PROGRAM

## 2025-01-21 PROCEDURE — 15276 SKIN SUB GRAFT F/N/HF/G ADDL: CPT | Mod: GC | Performed by: STUDENT IN AN ORGANIZED HEALTH CARE EDUCATION/TRAINING PROGRAM

## 2025-01-21 PROCEDURE — 250N000013 HC RX MED GY IP 250 OP 250 PS 637: Performed by: STUDENT IN AN ORGANIZED HEALTH CARE EDUCATION/TRAINING PROGRAM

## 2025-01-21 PROCEDURE — 258N000003 HC RX IP 258 OP 636: Performed by: NURSE ANESTHETIST, CERTIFIED REGISTERED

## 2025-01-21 PROCEDURE — 360N000075 HC SURGERY LEVEL 2, PER MIN: Performed by: STUDENT IN AN ORGANIZED HEALTH CARE EDUCATION/TRAINING PROGRAM

## 2025-01-21 PROCEDURE — 88307 TISSUE EXAM BY PATHOLOGIST: CPT | Mod: 26 | Performed by: DERMATOLOGY

## 2025-01-21 PROCEDURE — 999N000141 HC STATISTIC PRE-PROCEDURE NURSING ASSESSMENT: Performed by: STUDENT IN AN ORGANIZED HEALTH CARE EDUCATION/TRAINING PROGRAM

## 2025-01-21 PROCEDURE — 272N000001 HC OR GENERAL SUPPLY STERILE: Performed by: STUDENT IN AN ORGANIZED HEALTH CARE EDUCATION/TRAINING PROGRAM

## 2025-01-21 PROCEDURE — 88331 PATH CONSLTJ SURG 1 BLK 1SPC: CPT | Mod: 26 | Performed by: DERMATOLOGY

## 2025-01-21 PROCEDURE — 88305 TISSUE EXAM BY PATHOLOGIST: CPT | Mod: 26 | Performed by: DERMATOLOGY

## 2025-01-21 PROCEDURE — 88342 IMHCHEM/IMCYTCHM 1ST ANTB: CPT | Mod: 26 | Performed by: DERMATOLOGY

## 2025-01-21 PROCEDURE — 15275 SKIN SUB GRAFT FACE/NK/HF/G: CPT | Mod: 51 | Performed by: STUDENT IN AN ORGANIZED HEALTH CARE EDUCATION/TRAINING PROGRAM

## 2025-01-21 PROCEDURE — 250N000011 HC RX IP 250 OP 636: Performed by: NURSE ANESTHETIST, CERTIFIED REGISTERED

## 2025-01-21 PROCEDURE — 88341 IMHCHEM/IMCYTCHM EA ADD ANTB: CPT | Mod: TC | Performed by: STUDENT IN AN ORGANIZED HEALTH CARE EDUCATION/TRAINING PROGRAM

## 2025-01-21 PROCEDURE — 250N000009 HC RX 250: Performed by: NURSE ANESTHETIST, CERTIFIED REGISTERED

## 2025-01-21 PROCEDURE — 710N000012 HC RECOVERY PHASE 2, PER MINUTE: Performed by: STUDENT IN AN ORGANIZED HEALTH CARE EDUCATION/TRAINING PROGRAM

## 2025-01-21 PROCEDURE — 88360 TUMOR IMMUNOHISTOCHEM/MANUAL: CPT | Mod: 26 | Performed by: DERMATOLOGY

## 2025-01-21 PROCEDURE — 710N000010 HC RECOVERY PHASE 1, LEVEL 2, PER MIN: Performed by: STUDENT IN AN ORGANIZED HEALTH CARE EDUCATION/TRAINING PROGRAM

## 2025-01-21 PROCEDURE — 11624 EXC S/N/H/F/G MAL+MRG 3.1-4: CPT | Mod: 51 | Performed by: STUDENT IN AN ORGANIZED HEALTH CARE EDUCATION/TRAINING PROGRAM

## 2025-01-21 PROCEDURE — 370N000017 HC ANESTHESIA TECHNICAL FEE, PER MIN: Performed by: STUDENT IN AN ORGANIZED HEALTH CARE EDUCATION/TRAINING PROGRAM

## 2025-01-21 PROCEDURE — 88341 IMHCHEM/IMCYTCHM EA ADD ANTB: CPT | Mod: 26 | Performed by: DERMATOLOGY

## 2025-01-21 DEVICE — DRSG INTEGRA MATRIX MESH BILAYER 4X5" MWM4051 PER SQ CM= 129: Type: IMPLANTABLE DEVICE | Site: SCALP | Status: FUNCTIONAL

## 2025-01-21 RX ORDER — ONDANSETRON 2 MG/ML
INJECTION INTRAMUSCULAR; INTRAVENOUS PRN
Status: DISCONTINUED | OUTPATIENT
Start: 2025-01-21 | End: 2025-01-21

## 2025-01-21 RX ORDER — CEFAZOLIN SODIUM/WATER 2 G/20 ML
2 SYRINGE (ML) INTRAVENOUS SEE ADMIN INSTRUCTIONS
Status: DISCONTINUED | OUTPATIENT
Start: 2025-01-21 | End: 2025-01-21 | Stop reason: HOSPADM

## 2025-01-21 RX ORDER — FENTANYL CITRATE 50 UG/ML
50 INJECTION, SOLUTION INTRAMUSCULAR; INTRAVENOUS EVERY 5 MIN PRN
Status: DISCONTINUED | OUTPATIENT
Start: 2025-01-21 | End: 2025-01-21 | Stop reason: HOSPADM

## 2025-01-21 RX ORDER — DEXAMETHASONE SODIUM PHOSPHATE 4 MG/ML
INJECTION, SOLUTION INTRA-ARTICULAR; INTRALESIONAL; INTRAMUSCULAR; INTRAVENOUS; SOFT TISSUE PRN
Status: DISCONTINUED | OUTPATIENT
Start: 2025-01-21 | End: 2025-01-21

## 2025-01-21 RX ORDER — OXYCODONE HYDROCHLORIDE 5 MG/1
5 TABLET ORAL
Status: DISCONTINUED | OUTPATIENT
Start: 2025-01-21 | End: 2025-01-21 | Stop reason: HOSPADM

## 2025-01-21 RX ORDER — DEXAMETHASONE SODIUM PHOSPHATE 4 MG/ML
4 INJECTION, SOLUTION INTRA-ARTICULAR; INTRALESIONAL; INTRAMUSCULAR; INTRAVENOUS; SOFT TISSUE
Status: DISCONTINUED | OUTPATIENT
Start: 2025-01-21 | End: 2025-01-21 | Stop reason: HOSPADM

## 2025-01-21 RX ORDER — BACITRACIN ZINC 500 [USP'U]/G
OINTMENT TOPICAL DAILY
Status: DISCONTINUED | OUTPATIENT
Start: 2025-01-21 | End: 2025-01-21 | Stop reason: HOSPADM

## 2025-01-21 RX ORDER — SODIUM CHLORIDE, SODIUM LACTATE, POTASSIUM CHLORIDE, CALCIUM CHLORIDE 600; 310; 30; 20 MG/100ML; MG/100ML; MG/100ML; MG/100ML
INJECTION, SOLUTION INTRAVENOUS CONTINUOUS PRN
Status: DISCONTINUED | OUTPATIENT
Start: 2025-01-21 | End: 2025-01-21

## 2025-01-21 RX ORDER — LABETALOL HYDROCHLORIDE 5 MG/ML
10 INJECTION, SOLUTION INTRAVENOUS
Status: DISCONTINUED | OUTPATIENT
Start: 2025-01-21 | End: 2025-01-21 | Stop reason: HOSPADM

## 2025-01-21 RX ORDER — FENTANYL CITRATE 50 UG/ML
INJECTION, SOLUTION INTRAMUSCULAR; INTRAVENOUS PRN
Status: DISCONTINUED | OUTPATIENT
Start: 2025-01-21 | End: 2025-01-21

## 2025-01-21 RX ORDER — HYDROMORPHONE HCL IN WATER/PF 6 MG/30 ML
0.2 PATIENT CONTROLLED ANALGESIA SYRINGE INTRAVENOUS EVERY 5 MIN PRN
Status: DISCONTINUED | OUTPATIENT
Start: 2025-01-21 | End: 2025-01-21 | Stop reason: HOSPADM

## 2025-01-21 RX ORDER — HYDROMORPHONE HCL IN WATER/PF 6 MG/30 ML
0.4 PATIENT CONTROLLED ANALGESIA SYRINGE INTRAVENOUS EVERY 5 MIN PRN
Status: DISCONTINUED | OUTPATIENT
Start: 2025-01-21 | End: 2025-01-21 | Stop reason: HOSPADM

## 2025-01-21 RX ORDER — OXYCODONE HYDROCHLORIDE 5 MG/1
5-10 TABLET ORAL EVERY 4 HOURS PRN
Qty: 10 TABLET | Refills: 0 | Status: SHIPPED | OUTPATIENT
Start: 2025-01-21

## 2025-01-21 RX ORDER — PROPOFOL 10 MG/ML
INJECTION, EMULSION INTRAVENOUS PRN
Status: DISCONTINUED | OUTPATIENT
Start: 2025-01-21 | End: 2025-01-21

## 2025-01-21 RX ORDER — ONDANSETRON 4 MG/1
4 TABLET, ORALLY DISINTEGRATING ORAL EVERY 30 MIN PRN
Status: DISCONTINUED | OUTPATIENT
Start: 2025-01-21 | End: 2025-01-21 | Stop reason: HOSPADM

## 2025-01-21 RX ORDER — ACETAMINOPHEN 325 MG/1
650 TABLET ORAL
Status: DISCONTINUED | OUTPATIENT
Start: 2025-01-21 | End: 2025-01-21 | Stop reason: HOSPADM

## 2025-01-21 RX ORDER — LIDOCAINE HYDROCHLORIDE AND EPINEPHRINE 10; 10 MG/ML; UG/ML
INJECTION, SOLUTION INFILTRATION; PERINEURAL PRN
Status: DISCONTINUED | OUTPATIENT
Start: 2025-01-21 | End: 2025-01-21 | Stop reason: HOSPADM

## 2025-01-21 RX ORDER — CEFAZOLIN SODIUM/WATER 2 G/20 ML
2 SYRINGE (ML) INTRAVENOUS
Status: COMPLETED | OUTPATIENT
Start: 2025-01-21 | End: 2025-01-21

## 2025-01-21 RX ORDER — FENTANYL CITRATE 50 UG/ML
25 INJECTION, SOLUTION INTRAMUSCULAR; INTRAVENOUS EVERY 5 MIN PRN
Status: DISCONTINUED | OUTPATIENT
Start: 2025-01-21 | End: 2025-01-21 | Stop reason: HOSPADM

## 2025-01-21 RX ORDER — ONDANSETRON 2 MG/ML
4 INJECTION INTRAMUSCULAR; INTRAVENOUS EVERY 30 MIN PRN
Status: DISCONTINUED | OUTPATIENT
Start: 2025-01-21 | End: 2025-01-21 | Stop reason: HOSPADM

## 2025-01-21 RX ORDER — NALOXONE HYDROCHLORIDE 0.4 MG/ML
0.1 INJECTION, SOLUTION INTRAMUSCULAR; INTRAVENOUS; SUBCUTANEOUS
Status: DISCONTINUED | OUTPATIENT
Start: 2025-01-21 | End: 2025-01-21 | Stop reason: HOSPADM

## 2025-01-21 RX ORDER — ONDANSETRON 4 MG/1
4 TABLET, ORALLY DISINTEGRATING ORAL
Status: DISCONTINUED | OUTPATIENT
Start: 2025-01-21 | End: 2025-01-21 | Stop reason: HOSPADM

## 2025-01-21 RX ORDER — LIDOCAINE HYDROCHLORIDE 20 MG/ML
INJECTION, SOLUTION INFILTRATION; PERINEURAL PRN
Status: DISCONTINUED | OUTPATIENT
Start: 2025-01-21 | End: 2025-01-21

## 2025-01-21 RX ADMIN — PHENYLEPHRINE HYDROCHLORIDE 0.5 MCG/KG/MIN: 10 INJECTION INTRAVENOUS at 08:57

## 2025-01-21 RX ADMIN — DEXAMETHASONE SODIUM PHOSPHATE 6 MG: 4 INJECTION, SOLUTION INTRA-ARTICULAR; INTRALESIONAL; INTRAMUSCULAR; INTRAVENOUS; SOFT TISSUE at 08:33

## 2025-01-21 RX ADMIN — Medication 10 MG: at 09:56

## 2025-01-21 RX ADMIN — PHENYLEPHRINE HYDROCHLORIDE 100 MCG: 10 INJECTION INTRAVENOUS at 08:49

## 2025-01-21 RX ADMIN — Medication 30 MG: at 08:11

## 2025-01-21 RX ADMIN — SUGAMMADEX 200 MG: 100 INJECTION, SOLUTION INTRAVENOUS at 10:51

## 2025-01-21 RX ADMIN — Medication 20 MG: at 08:39

## 2025-01-21 RX ADMIN — NOREPINEPHRINE BITARTRATE 6.4 MCG: 1 INJECTION, SOLUTION, CONCENTRATE INTRAVENOUS at 08:47

## 2025-01-21 RX ADMIN — SODIUM CHLORIDE, POTASSIUM CHLORIDE, SODIUM LACTATE AND CALCIUM CHLORIDE: 600; 310; 30; 20 INJECTION, SOLUTION INTRAVENOUS at 08:00

## 2025-01-21 RX ADMIN — PHENYLEPHRINE HYDROCHLORIDE 100 MCG: 10 INJECTION INTRAVENOUS at 08:11

## 2025-01-21 RX ADMIN — PHENYLEPHRINE HYDROCHLORIDE 50 MCG: 10 INJECTION INTRAVENOUS at 08:43

## 2025-01-21 RX ADMIN — Medication 10 MG: at 09:08

## 2025-01-21 RX ADMIN — FENTANYL CITRATE 50 MCG: 50 INJECTION INTRAMUSCULAR; INTRAVENOUS at 10:30

## 2025-01-21 RX ADMIN — NOREPINEPHRINE BITARTRATE 6.4 MCG: 1 INJECTION, SOLUTION, CONCENTRATE INTRAVENOUS at 08:45

## 2025-01-21 RX ADMIN — LIDOCAINE HYDROCHLORIDE 100 MG: 20 INJECTION, SOLUTION INFILTRATION; PERINEURAL at 08:11

## 2025-01-21 RX ADMIN — FENTANYL CITRATE 50 MCG: 50 INJECTION INTRAMUSCULAR; INTRAVENOUS at 08:41

## 2025-01-21 RX ADMIN — FENTANYL CITRATE 50 MCG: 50 INJECTION INTRAMUSCULAR; INTRAVENOUS at 09:53

## 2025-01-21 RX ADMIN — ACETAMINOPHEN 650 MG: 325 TABLET, FILM COATED ORAL at 11:41

## 2025-01-21 RX ADMIN — PHENYLEPHRINE HYDROCHLORIDE 200 MCG: 10 INJECTION INTRAVENOUS at 08:28

## 2025-01-21 RX ADMIN — PHENYLEPHRINE HYDROCHLORIDE 100 MCG: 10 INJECTION INTRAVENOUS at 10:44

## 2025-01-21 RX ADMIN — Medication 2 G: at 08:06

## 2025-01-21 RX ADMIN — PROPOFOL 150 MG: 10 INJECTION, EMULSION INTRAVENOUS at 08:11

## 2025-01-21 RX ADMIN — FENTANYL CITRATE 50 MCG: 50 INJECTION INTRAMUSCULAR; INTRAVENOUS at 08:11

## 2025-01-21 RX ADMIN — ONDANSETRON 4 MG: 2 INJECTION INTRAMUSCULAR; INTRAVENOUS at 08:33

## 2025-01-21 ASSESSMENT — ENCOUNTER SYMPTOMS: SEIZURES: 1

## 2025-01-21 ASSESSMENT — ACTIVITIES OF DAILY LIVING (ADL)
ADLS_ACUITY_SCORE: 38

## 2025-01-21 NOTE — ANESTHESIA POSTPROCEDURE EVALUATION
Patient: Michael Maldonado    Procedure: Procedure(s):  Wide Local Excision Scalp Lesions, Burring of Skull and Integra Placement       Anesthesia Type:  General    Note:  Disposition: Outpatient   Postop Pain Control: Uneventful            Sign Out: Well controlled pain   PONV: No   Neuro/Psych: Uneventful            Sign Out: Acceptable/Baseline neuro status   Airway/Respiratory: Uneventful            Sign Out: Acceptable/Baseline resp. status   CV/Hemodynamics: Uneventful            Sign Out: Acceptable CV status; No obvious hypovolemia; No obvious fluid overload   Other NRE: NONE   DID A NON-ROUTINE EVENT OCCUR? No       Last vitals:  Vitals Value Taken Time   /98 01/21/25 1103   Temp     Pulse 72 01/21/25 1110   Resp 21 01/21/25 1110   SpO2 94 % 01/21/25 1110   Vitals shown include unfiled device data.    Electronically Signed By: Khoi Muniz MD  January 21, 2025  11:11 AM

## 2025-01-21 NOTE — OP NOTE
Otolaryngology Operative Report  2025     Date of Operation: 2025  Patient Name: Michael Maldonado  Patient : 1940   Patient MRN: [id number]    Staff Surgeon: Lcuas Chaves MD  Resident Surgeon: Nesha Joyce MD  Preoperative Diagnosis:   1.Cutaneous squamous cell carcinoma  Postoperative Diagnosis: Same  Procedure: 1. Wide local excision scalp lesions. Right anterior measuring 4 x4 cm, right posterior measuring 6 x6 cm  2. Burring of outer table of calvarium  3) integra placement for reconstruction of above defects    Anesthesia: General  Findings: Two SCC lesions completely excised with negative frozen margins. Integra placed and Xeroform bolsters fixed with staples and sutures. No complications.   EBL: 25 mL  Complications: None  Specimens: None    Clinical Indications: Michael Maldonado is a 84 year old male wiith history of multiple cutaneous squamous cell carcinomas of the scalp. Two areas needed additional surgery; one anteriorly that needed additional resection to achieve clear margins, and a second right posterior lesion that needed post-biopsy definitive management. Therefore, he was recommended to undergo aforementioned procedure. All risks and benefits were discussed with the patient and he elected to proceed with the procedure.    Description of Procedure: After consent, the patient was brought to the operating room and placed in the supine position.  Following induction, the patient was intubated orotracheally. Monitoring lines were placed as appropriate.  The head was appropriately positioned using towels. A time-out was performed to confirm the identity of this patient and all were in agreement. The patient was prepped and draped in the usual sterile fashion for scalp surgery.    First, attention was turned to resection of the involved lesions.  The two lesions were identified, one of the anterior right scalp and one more posteriorly.  Both lesions were excised circumferentially  with 10 mm margins.  The specimens were excised down to the calvarium, and specimens were oriented and sent to pathology. Peristeum grossly uninvolved and stripped from skull easily. Hemostasis was achieved with bioplar cautery. Frozen specimens were sent of the periphery of each lesion.  Postero medial margins of the anterior lesion returned with concern for invasive squamous cell carcinoma, so this area was reresected and sent to pathology.  Upon re-resection, all frozen specimens were negative for invasive squamous cell carcinoma.  At this juncture, the anterior lesion measured 4 cm in diameter, and the posterior lesion measured 6 cm in diameter.  Using a cutting bur, the cortical bone underlying each lesion was drilled enough to expose pinpoint bleeding.  There were no areas that appeared thin or dehiscent, and there was no exposure or injury to the dura.  Attention was then turned to closing the defects, beginning with the anterior lesion.  A Monocryl circumferential purse stitch was sewn into the defect.  An appropriately sized Integra piece was fashioned and tacked into the defect using Vicryl suture.  Once in good position, a Xeroform bolster was placed on top of the Integra covering the defect, and this was secured in place using a combination of staples and nylon suture.  Attention was turned to the posterior lesion, and this was covered in the same fashion.  Hemostasis was confirmed for both lesions    This concluded the procedure and the patient was handed back over to the care of the Anesthesia team.  The patient tolerated the procedure well and no complications were encountered.    Dr. Chaves was present for the duration of the procedure.    Nesha Joyce MD  Otolaryngology-Head & Neck Surgery PGY-3

## 2025-01-21 NOTE — ANESTHESIA PREPROCEDURE EVALUATION
Anesthesia Pre-Procedure Evaluation    Patient: Michael Maldonado   MRN: [id number] : 1940        Procedure : Procedure(s):  Wide Local Excision Right Scalp, Burring of Skull and Integra Placement          Past Medical History:   Diagnosis Date    BPH (benign prostatic hyperplasia)     Head and neck cancer (H) 2016    History of deep vein thrombophlebitis of lower extremity 2018    Hyperlipidemia     Hypertension     Leukopenia     mild    Osteoarthritis     Stroke (H)       Past Surgical History:   Procedure Laterality Date    CRANIOTOMY FOR AVM  1970    clipped    JOINT REPLACEMENT      SALIVARY GLAND SURGERY Right     squamous cell cancer grade 3    TOTAL KNEE ARTHROPLASTY Left     ZZC TOTAL KNEE ARTHROPLASTY Right 2019    Procedure: RIGHT TOTAL KNEE  ARTHROPLASTY;  Surgeon: Darrel Page DO;  Location: [facility] OR;  Service: Orthopedics      No Known Allergies   Social History     Tobacco Use    Smoking status: Former     Types: Cigars     Quit date: 2000     Years since quittin.0     Passive exposure: Never    Smokeless tobacco: Never   Substance Use Topics    Alcohol use: Yes     Alcohol/week: 1.0 standard drink of alcohol     Comment: Alcoholic Drinks/day: nightly      Wt Readings from Last 1 Encounters:   25 89.3 kg (196 lb 13.9 oz)        Anesthesia Evaluation            ROS/MED HX  ENT/Pulmonary:       Neurologic: Comment: Partial complex seizure presenting as acute confusional state November 10, 2022  On Keppra    (+)       seizures,   CVA,    TIA,                  Cardiovascular:     (+)  hypertension- -   -  - -                                      METS/Exercise Tolerance:     Hematologic:       Musculoskeletal:       GI/Hepatic:       Renal/Genitourinary:       Endo:       Psychiatric/Substance Use:       Infectious Disease:       Malignancy:       Other:            Physical Exam    Airway        Mallampati: III   TM distance: > 3 FB   Neck ROM: full  "  Mouth opening: > 3 cm    Respiratory Devices and Support         Dental       (+) Modest Abnormalities - crowns, retainers, 1 or 2 missing teeth    B=Bridge, C=Chipped, L=Loose, M=Missing    Cardiovascular   cardiovascular exam normal          Pulmonary   pulmonary exam normal              OUTSIDE LABS:  CBC:   Lab Results   Component Value Date    WBC 4.5 01/14/2025    WBC 4.0 01/30/2024    HGB 15.2 01/14/2025    HGB 15.2 01/30/2024    HCT 46.1 01/14/2025    HCT 46.4 01/30/2024     (L) 01/14/2025     (L) 01/30/2024     BMP:   Lab Results   Component Value Date     01/14/2025     02/05/2024    POTASSIUM 4.3 01/14/2025    POTASSIUM 4.2 10/08/2024    CHLORIDE 105 01/14/2025    CHLORIDE 106 02/05/2024    CO2 27 01/14/2025    CO2 27 02/05/2024    BUN 16.6 01/14/2025    BUN 14.1 01/30/2024    CR 1.15 01/14/2025    CR 1.3 11/06/2024    GLC 86 01/14/2025    GLC 99 01/30/2024     COAGS:   Lab Results   Component Value Date    PTT 30 11/10/2022    INR 1.05 11/10/2022     POC: No results found for: \"BGM\", \"HCG\", \"HCGS\"  HEPATIC:   Lab Results   Component Value Date    ALBUMIN 4.6 01/30/2024    PROTTOTAL 7.1 01/30/2024    ALT 30 01/30/2024    AST 31 01/30/2024    ALKPHOS 75 01/30/2024    BILITOTAL 0.8 01/30/2024     OTHER:   Lab Results   Component Value Date    A1C 5.9 (H) 01/25/2023    JACIEL 9.8 01/14/2025    MAG 1.9 05/17/2023    TSH 4.05 10/08/2024       Anesthesia Plan    ASA Status:  3    NPO Status:  NPO Appropriate    Anesthesia Type: General.     - Airway: ETT   Induction: Intravenous.   Maintenance: Balanced.        Consents    Anesthesia Plan(s) and associated risks, benefits, and realistic alternatives discussed. Questions answered and patient/representative(s) expressed understanding.     - Discussed: Risks, Benefits and Alternatives for BOTH SEDATION and the PROCEDURE were discussed     - Discussed with:  Patient      - Extended Intubation/Ventilatory Support Discussed: No.      - " "Patient is DNR/DNI Status: No     Use of blood products discussed: No .     Postoperative Care    Pain management: IV analgesics.   PONV prophylaxis: Ondansetron (or other 5HT-3), Dexamethasone or Solumedrol     Comments:               Frank Weaver MD    I have reviewed the pertinent notes and labs in the chart from the past 30 days and (re)examined the patient.  Any updates or changes from those notes are reflected in this note.             # Drug Induced Platelet Defect: home medication list includes an antiplatelet medication   # Hypertension: Noted on problem list           # Overweight: Estimated body mass index is 29.07 kg/m  as calculated from the following:    Height as of this encounter: 1.753 m (5' 9\").    Weight as of this encounter: 89.3 kg (196 lb 13.9 oz).             "

## 2025-01-21 NOTE — ANESTHESIA PROCEDURE NOTES
Airway       Patient location during procedure: OR       Procedure Start/Stop Times: 1/21/2025 8:25 AM  Staff -        Anesthesiologist:  Frank Weaver MD       CRNA: Arlene Miller APRN CRNA       Performed By: CRNA  Consent for Airway        Urgency: elective  Indications and Patient Condition       Indications for airway management: guy-procedural       Induction type:intravenous       Mask difficulty assessment: 2 - vent by mask + OA or adjuvant +/- NMBA    Final Airway Details       Final airway type: endotracheal airway       Successful airway: ETT - single  Endotracheal Airway Details        ETT size (mm): 7.5       Cuffed: yes       Cuff volume (mL): 8       Successful intubation technique: video laryngoscopy       VL Blade Size: Glidescope 3       Grade View of Cords: 1       Adjucts: stylet       Position: Right       Measured from: gums/teeth       Secured at (cm): 22       Bite block used: None    Post intubation assessment        Placement verified by: capnometry, equal breath sounds and chest rise        Number of attempts at approach: 1       Secured with: tape       Ease of procedure: easy       Dentition: Intact    Medication(s) Administered   Medication Administration Time: 1/21/2025 8:25 AM

## 2025-01-21 NOTE — ANESTHESIA CARE TRANSFER NOTE
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    Anesthesia Type:   General     Note:    Oropharynx: oropharynx clear of all foreign objects  Level of Consciousness: awake  Oxygen Supplementation: room air    Independent Airway: airway patency satisfactory and stable  Dentition: dentition unchanged  Vital Signs Stable: post-procedure vital signs reviewed and stable  Report to RN Given: handoff report given  Patient transferred to: Phase II    Handoff Report: Identifed the Patient, Identified the Reponsible Provider, Reviewed the pertinent medical history, Discussed the surgical course, Reviewed Intra-OP anesthesia mangement and issues during anesthesia, Set expectations for post-procedure period and Allowed opportunity for questions and acknowledgement of understanding      Vitals:  Vitals Value Taken Time   /98 01/21/25 1103   Temp     Pulse 75 01/21/25 1111   Resp 19 01/21/25 1111   SpO2 95 % 01/21/25 1111   Vitals shown include unfiled device data.    Electronically Signed By: YOLIS Canela CRNA  January 21, 2025  11:12 AM

## 2025-01-21 NOTE — BRIEF OP NOTE
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]  Post-operative diagnosis Same as pre-operative diagnosis    Procedure: Wide Local Excision Scalp Lesions, Burring of Skull and Integra Placement, Right - Head    Surgeon: Surgeons and Role:     * Lucas Chaves MD - Primary     * Nesha Joyce MD - Assisting  Anesthesia: General   Estimated Blood Loss: 25 mL from 1/21/2025  7:56 AM to 1/21/2025 11:00 AM      Drains: None  Specimens:   ID Type Source Tests Collected by Time Destination   1 : Wide Local excision Right Posterior Scalp Lesion - Single Stitch ANTERIOR, Double Stitch LATERAL Tissue Other SURGICAL PATHOLOGY EXAM Lucas Chaves MD 1/21/2025  8:44 AM    2 : Right Posterior Scalp lesion - Posterior Lateral Margin Tissue Other SURGICAL PATHOLOGY EXAM Lucas Chaves MD 1/21/2025  8:50 AM    3 : RIght Posterior Scalp Lesion - Posterior Medial Margin Tissue Other SURGICAL PATHOLOGY EXAM Lucas Chaves MD 1/21/2025  8:52 AM    4 : Right Posterior Scalp Lesion - Anterior Lateral Margin Tissue Other SURGICAL PATHOLOGY EXAM Lucas Chaves MD 1/21/2025  8:54 AM    5 : Right Posterior Scalp Lesion - Anterior Medial Margin Tissue Other SURGICAL PATHOLOGY EXAM Lucas Chaves MD 1/21/2025  8:55 AM    6 : Right Anterior Scalp Lesion - Single Stitch ANTERIOR, Double Stitch LATERAL Tissue Other SURGICAL PATHOLOGY EXAM Lucas Chaves MD 1/21/2025  9:03 AM    7 : Right Anterior Scalp Lesion - Posterior Lateral Margin Tissue Other SURGICAL PATHOLOGY EXAM Lucas Chaves MD 1/21/2025  9:06 AM    8 : Right Anterior Scalp Lesion - Posterior Medial Margin Tissue Other SURGICAL PATHOLOGY EXAM Lucas Chaves MD 1/21/2025  9:07 AM    9 : Right Anterior Scalp Lesion - Anterior Lateral Margin Tissue Other SURGICAL PATHOLOGY EXAM Lucas Chaves MD  "1/21/2025  9:08 AM    10 : Right Anterior Scalp Lesion - Anterior Medial Margin Tissue Other SURGICAL PATHOLOGY EXAM Lucas Chaves MD 1/21/2025  9:10 AM    11 : Right Anterior Scalp Lesion - Revision Resection of Posterior Medial Margin Tissue Other SURGICAL PATHOLOGY EXAM Lucas Chaves MD 1/21/2025 10:05 AM    12 : Right Anterior Scalp Lesion - Revision Resection of Posterior Medial Margin Tissue Other SURGICAL PATHOLOGY EXAM Lucas Chaves MD 1/21/2025 10:07 AM      Findings:  Two SCC lesions completely excised with negative frozen margins. Integra placed and Xeroform bolsters fixed with staples and sutures. No complications.  Complications: None.  Implants:   Implant Name Type Inv. Item Serial No.  Lot No. LRB No. Used Action   DRSG INTEGRA MATRIX MESH BILAYER 4X5\" [id number] PER SQ CM= 129 - [id number] Bone/Tissue Synthetic DRSG INTE[employer]RA MATRIX MESH BILAYER 4X5\" [id number] PER SQ CM= 129  [employer] [id number] Right 1 Implanted       Post-op Plan:  - Discharge home  - Pain control with Tylenol and oxycodone  - Keep xeroform bolsters moist with Vaseline/Aquaphor, okay to shower below the neck but keep bolsters dry.   - Bolster to be removed in ENT clinic at follow up 2/3 at 12:00 PM    Nesha Joyce MD  Otolaryngology-Head & Neck Surgery PGY-3      "

## 2025-01-21 NOTE — DISCHARGE INSTRUCTIONS
Contacting your Doctor -   To contact a doctor, call DR Lynn's office at [phone]  or:  [phone] and ask for the resident on call for ENT (answered 24 hours a day)   Emergency Department:  [facility]: [phone]  [facility]: [phone] 911 if you are in need of immediate or emergent help

## 2025-01-29 ENCOUNTER — PATIENT OUTREACH (OUTPATIENT)
Dept: CARE COORDINATION | Facility: CLINIC | Age: 85
End: 2025-01-29
Payer: COMMERCIAL

## 2025-01-30 LAB
PATH REPORT.COMMENTS IMP SPEC: ABNORMAL
PATH REPORT.COMMENTS IMP SPEC: YES
PATH REPORT.FINAL DX SPEC: ABNORMAL
PATH REPORT.GROSS SPEC: ABNORMAL
PATH REPORT.INTRAOP OBS SPEC DOC: ABNORMAL
PATH REPORT.MICROSCOPIC SPEC OTHER STN: ABNORMAL
PATH REPORT.RELEVANT HX SPEC: ABNORMAL
PHOTO IMAGE: ABNORMAL

## 2025-01-30 NOTE — PROGRESS NOTES
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  He has remained seizure-free.  I have recommended:    1.  Continue Keppra 500 mg twice daily.  Prescriptions refilled  2.  Check Keppra level and basic metabolic panel  3.  Follow-up in 1 year    Thank you again for this referral, please feel free to contact me if you have any questions.    Carltios Finch MD  [facility] NEUROLOGY, [place]     HISTORY OF PRESENT ILLNESS     Patient is a pleasant 84-year-old gentleman with history of HTN, HLD, alcohol abuse, hypothyroidism, SNHL, cerebral AV malformation s/p craniotomy in , parotid cancer last seen on 2024 for complex partial seizures who returns for follow-up.  Previous workup included an EEG that showed left parasagittal sharp activity and was started on Keppra with good control of his symptoms.  During his last visit he was continued on Keppra 500 mg twice daily and anticonvulsant level were checked that was therapeutic at 17.9.Since his last visit he was admitted to the hospital for excision of cutaneous squamous cell carcinoma.  He had wide excision of scalp lesion right anterior measuring 4 x 4 cm right posterior measuring 6 x 6 cm.  He denies any seizures.    BRIEFLY patient is a male with history of HTN, HLD, EtOH abuse, SNHL, cerebral AV malformation s/p craniotomy in , parotid gland cancer who  was initially seen on 11/15/2022 after an evaluation in the ER for an episode of confusion. According to wife he had acute onset when he was asking questions repetitively initially when he presented to the emergency room he had a CT of the head and CTA that was unremarkable aside from some chronic changes.  Although admission was recommended he wanted to go home.  He was started on aspirin and simvastatin and subsequently had an EEG that was abnormal showing left parasagittal sharp discharges suggesting low threshold for seizure.  30-day event monitor and echocardiogram were normal.  He was started on Keppra.     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    Surgical History:  He  has a past surgical history that includes Craniotomy For Avm (1970); Total Knee Arthroplasty (Left, 2013); Salivary gland surgery (Right, 2011); joint replacement; TOTAL KNEE ARTHROPLASTY (Right, 1/7/2019); and Excise lesion head (Right, 1/21/2025).     SOCIAL HISTORY     Reviewed, and he  reports that he quit smoking about 25 years ago. His smoking use included cigars. He has never been exposed to tobacco smoke. He has never used smokeless tobacco. He reports  "current alcohol use of about 1.0 standard drink of alcohol per week. He reports that he does not use drugs.     FAMILY HISTORY     Reviewed, and family history includes Cirrhosis in his father; Diabetes in his father.     ALLERGIES     No Known Allergies      REVIEW OF SYSTEMS     A 12 point review of system was performed and was negative except as outlined in the history of present illness.     HOME MEDICATIONS     Current Outpatient Rx   Medication Sig Dispense Refill    amLODIPine (NORVASC) 5 MG tablet TAKE 1 TABLET BY MOUTH EVERY DAY 90 tablet 1    aspirin (ASA) 81 MG EC tablet Take 1 tablet by mouth every other day      fluorouracil (EFUDEX) 5 % external cream APPLY 1 APPLICATION TOPICALLY TO SCALP AND FOREHEAD      levETIRAcetam (KEPPRA) 500 MG tablet Take 1 tablet (500 mg) by mouth 2 times daily. 180 tablet 3    levothyroxine (SYNTHROID/LEVOTHROID) 88 MCG tablet Take 1 tablet (88 mcg) by mouth daily. 90 tablet 3    losartan (COZAAR) 50 MG tablet Take 1 tablet (50 mg) by mouth daily. 90 tablet 3    omeprazole (PRILOSEC) 20 MG DR capsule TAKE 1 CAPSULE BY MOUTH TWICE DAILY BEFORE MEALS 180 capsule 2    oxyCODONE (ROXICODONE) 5 MG tablet Take 1-2 tablets (5-10 mg) by mouth every 4 hours as needed for moderate to severe pain. 10 tablet 0    simvastatin (ZOCOR) 80 MG tablet TAKE 1/2 TABLET BY MOUTH EVERY DAY 45 tablet 2         PHYSICAL EXAM     Vital signs  /66 (BP Location: Left arm, Patient Position: Sitting)   Pulse 78   Ht 1.753 m (5' 9\")   Wt 90.3 kg (199 lb)   BMI 29.39 kg/m      Weight:   199 lbs 0 oz    Elderly gentleman who is alert and oriented vital signs were reviewed and documented in electronic medical record.  Neck was supple.  He has facial deformity due to previous surgery cranial nerves are intact except for sensorineural hearing loss.  He has normal mass and tone with 5/5 strength reflexes 1+ toes equivocal.  He has a wide-based gait and has trouble tandem walking.  Sensation intact " with no double simultaneous extinction     PERTINENT DIAGNOSTIC STUDIES     Following studies were reviewed:     CT BRAIN 5/17/2023  1.  Stable head CT compared with 11/10/2022, without acute intracranial abnormality.  2.  Stable postoperative changes related to prior left parietal craniotomy, with underlying broad encephalomalacia and gliosis and partial resection of the posterior body and splenium of the corpus callosum.  3.  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    CTA HEAD & NECK 11/10/2022  HEAD CT:  1.  No finding for intracranial hemorrhage or mass or convincing finding for acute infarct.     2.  Postsurgical changes relating to prior left parietal craniotomy. Broad area of encephalomalacic change gliosis is seen within the subjacent left frontal lobe and surgical clips are seen in the region of the pineal gland.     3.  There is marked dilatation of the atrium of the left lateral ventricle with an apparent arachnoid cyst bowing the septum pellucidum to the right. There is prominent enlargement of the left temporal horn and occipital horn without enlargement of the   left frontal horn suggesting postobstructive dilatation of the temporal and occipital horns.     4.  If prior studies can be provided for comparison, a comparison will be made and an addendum issued. If no prior studies are available for comparison, consider correlation with MRI for further assessment.     5.  Mild volume loss and presumed sequela chronic microvascular ischemic change.     HEAD CTA:   1. No finding for vascular cutoff, aneurysm, or flow-limiting stenosis.  2. Coarse atherosclerotic plaque is seen within both carotid siphons without significant associated luminal stenosis.     NECK CTA:  1.  Moderate atherosclerotic plaque right carotid bulb/bifurcation with  mild associated luminal narrowing.     2.  Coarse atherosclerotic plaque is seen within the proximal right vertebral artery with more mild atherosclerotic plaque on the left. There is moderate or high-grade narrowing of the proximal right vertebral artery which, where visualized, is similar   to the 1/22/2019 CTA chest.     EEG 11/23/2022  This is an abnormal EEG due to asymmetric slowing of the background that is maximally expressed on the left hemisphere. Asymmetry of the amplitude likely is due to breach effect. Additionally polyspike discharges are noted in the left parasagittal head region with phase reversal at C3 that suggest a low threshold for seizures.      30-DAY EVENT MONITOR 12/28/2022  Cardiac event monitoring from 12/28/2022 to 1/26/2023 (monitored duration 16d 19h 17m).  Baseline rhythm was sinus rhythm.    Reported heart rate range 50 to 120bpm, average 71bpm.  No symptom triggers.  Seven automated recordings included sinus rhythm with 1st degree AV delay and rare PACs and PVCs.  No sustained tachyarrhythmias.  No atrial fibrillation.  There were no pauses noted.  Supraventricular and ventricular ectopic beat frequency are not reported on this monitoring modality.       ECHOCARDIOGRAM 12/21/2022  The left ventricle is normal in size with mild concentric left ventricular  hypertrophy.  Left ventricular function is normal.The ejection fraction is 60-65%.  Normal right ventricle size and systolic function.  A contrast injection (Bubble Study) was performed that was negative for flow  across the interatrial septum.  Mild age-related valve disease is present without significant stenosis or  regurgitation.  There is no comparison study available.     PERTINENT LABS  Following labs were reviewed:  Admission on 01/21/2025, Discharged on 01/21/2025   Component Date Value Ref Range Status    GLUCOSE BY METER POCT 01/21/2025 106 (H)  70 - 99 mg/dL Final    Case Report 01/21/2025    Final                     Value:Surgical Pathology Report                         Case: [id number]                                  Authorizing Provider:  Lucas Chaves MD  Collected:           01/21/2025 08:44 AM          Ordering Location:      MAIN OR                 Received:            01/21/2025 03:41 PM          Pathologist:           Linwood Cunningham MD                                                         Intraop:               Valentino Cardoza MD                                                             Specimens:   A) - Other, Wide Local excision Right Posterior Scalp Lesion - Single Stitch                        ANTERIOR, Double Stitch LATERAL                                                                     B) - Other, Right Posterior Scalp lesion - Posterior Lateral Margin                                 C) - Other, RIght Posterior Scalp Lesion - Posterior Medial Margin                                  D) - Other, Right Posterior Scalp Lesion - Anterior Lateral Margin                                                            E) - Other, Right Posterior Scalp Lesion - Anterior Medial Margin                                   F) - Other, Right Anterior Scalp Lesion - Single Stitch ANTERIOR, Double Stitch                     LATERAL                                                                                             G) - Other, Right Anterior Scalp Lesion - Posterior Lateral Margin                                  H) - Other, Right Anterior Scalp Lesion - Posterior Medial Margin                                   I) - Other, Right Anterior Scalp Lesion - Anterior Lateral Margin                                   J) - Other, Right Anterior Scalp Lesion - Anterior Medial Margin                                    K) - Other, Right Anterior Scalp Lesion - Revision Resection of Posterior Medial                    Margin                                                                                              L) -  Other, Right Anterior Scalp Lesion - Revision Resection of Posterior Medial                                              Margin                                                                                     Final Diagnosis 01/21/2025    Final                    Value:A. Wide local excision right posterior scalp lesion - single stitch anterior, double stitch lateral:  - Scar from the prior surgical procedure, with no evidence of residual lesion - (see description)    B. Right posterior scalp lesion - posterior lateral margin:  - Negative for malignancy - (see description)    C. RIght posterior scalp lesion - posterior medial margin:  - Negative for malignancy - (see description)    D. Right posterior scalp lesion - anterior lateral margin:  - Negative for malignancy - (see description)    E. Right posterior scalp lesion - anterior medial margin:  - Negative for malignancy - (see description)    F. Right anterior scalp lesion - single stitch anterior, double stitch lateral:  - Atypical spindle cell proliferation, consistent with pleomorphic dermal sarcoma - (see description)  - Tumor is present at the 6:00-9:00 and 9:00-12:00 inked margins    G. Right anterior scalp lesion - posterior lateral margin:  - Negative for malignancy - (see                           description)    H. 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 Right anterior scalp lesion - anterior lateral margin:  - Negative for malignancy - (see description)    J. Right anterior scalp lesion - anterior medial margin:  - Negative for malignancy - (see description)     K. 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 Right anterior scalp lesion - revision resection of posterior medial margin:  - Negative for malignancy - (see description)      Comment 01/21/2025    Final                     Value:This case was reviewed at Dermatopathology consensus conference on 1/29/2025.  Intradepartmental consultation with Soft Tissue pathology was also obtained.      Clinical Information 01/21/2025    Final                    Value:The patient is an 84 year old male.        Intraoperative Consultation 01/21/2025    Final                    Value:B(2). Other, Right Posterior Scalp lesion - Posterior Lateral Margin:  BFS1:  - Negative for carcinoma    Valentino Cardoza MD on 1/21/2025 at 9:55 AM  Intra op performed at: LABORATORY, [facility], [address], [facility], [address], [place] [id number]  Intra-op Dx verbally delivered to Dr. Anastacio RAINEY(3). Other, RIght Posterior Scalp Lesion - Posterior Medial Margin:  CFS1:  - Negative for carcinoma    Valentino Cardoza MD on 1/21/2025 at 9:55 AM  Intra op performed at: LABORATORY, [facility], [address] [facility], [address], [facility] [id number]  Intra-op Dx verbally delivered to Dr. Anastacio IVAN(4). Other, Right Posterior Scalp Lesion - Anterior Lateral Margin:  DFS1:  - Negative for carcinoma    Valentino Cardoza MD on 1/21/2025 at 9:55 AM  Intra op performed at: LABORATORY, [facility], [address], [facility], [address], [facility] [address]  Intra-op Dx verbally delivered to Dr. Anastacio REINA(5).                           Other, Right Posterior Scalp Lesion - Anterior Medial Margin:  EFS1:  - Suspicious for high-grade dysplasia    Valentino Cardoza MD on 1/21/2025 at 9:55 AM  Intra op performed at: LABORATORY, [facility], [address] [facility], [address], [facility] [phone]  Intra-op Dx verbally delivered to Dr. Anastacio ESQUIVEL(7). Other, Right Anterior Scalp Lesion - Posterior Lateral Margin:  GFS1:  - Negative for wilton Cardoza MD on 1/21/2025 at 9:55 AM  Intra op performed at: LABORATORY, [facility], [address] [facility]  "[facility], [address] [phone]  Intra-op Dx verbally delivered to Dr. Anastacio CANTOR(8). Other, Right Anterior Scalp Lesion - Posterior Medial Margin:  HFS1:  - Positive for malignancy    Valentino Carodza MD on 1/21/2025 at 9:55 AM  Intra op performed at: LABORATORY, [facility], [address] [facility], [address] [address]  Intra-op Dx verbally delivered to Dr. Anastacio SOLER(9). Other, Right Anterior Scalp Lesion - Anterior Lateral Margin:  IFS1:  - Negative for carcinoma    Valentino Cardoza MD on 1/21/2025 at 9:55 AM  Intra op performed at:[facility], [facility], [address] [facility], [address] [phone]  Intra-op Dx verbally delivered to Dr. Anastacio GARCIA(10). Other, Right Anterior Scalp Lesion - Anterior Medial Margin:  JFS1:  - Negative for carcinoma    Valentino Cardoza MD on 1/21/2025 at 9:55 AM  Intra op performed at: LABORATORY, [facility], [address] [facility], [address], [facility] [address]  Intra-op Dx verbally delivered to Dr. Anastacio BIRMINGHAM(12). Other, Right Anterior Scalp Lesion - Revision Resection of Posterior Medial Margin:  LFS1:  - Negative for carcinoma    Valentino Cardoza MD on 1/21/2025 at 10:47 AM  Intra op performed at: LABORATORY, [facility], [address] [facility], [address], [facility] [address]  Intra-op Dx verbally delivered                           to Dr. Chaves       Gross Description 01/21/2025    Final                    Value:A(1). Other, Wide Local excision Right Posterior Scalp Lesion - Single Stitch ANTERIOR, Double Stitch LATERAL:  The specimen is received in formalin with proper patient identification, labeled \"wide local excision right posterior scalp lesion-single stitch anterior, double stitch lateral\".  The specimen consists of a 4.6 cm 12:00 to 6:00 by 4.3 cm 3:00 to 9:00 ovoid skin " "excision excised to a depth of 0.6 cm.  There is a single stitch designated as anterior, and a double stitch designated as a lateral.  Anterior is redesignated as 12:00.  Lateral is redesignated as 3:00.  The specimen is inked as follows:  12:00 to 3:00-blue  3:00 to 6:00-orange  6:00 to 9:00-green  9:00 to 12:00-yellow  Deep-black  The periosteum is retracted, and the true deep margin is inked black.  There is a 4.0 x 3.5 x 0.4 cm irregularly shaped, white-tan, friable skin lesion.  The lesion abuts 6:00 and 9:00.  The lesion is 1.0 cm to 3:00 and 1.0 cm 12:00.  The lesion is 0.5 cm to the deep margin.  A gross                           photograph is taken.  Representative sections are submitted in A1-A10, including all peripheral margins.    Summary of sections:  A1.  Lesion with closest 12:00 to 3:00  A2.  Remainder of 12:00 to 3:00, en face  A3.  Lesion with closest 3:00 to 6:00   A4.  Remainder of 3:00 to 6:00, en face  A5-A6.  Lesion with entire perpendicular 6:00 to 9:00  A7-A9.  Lesion with entire perpendicular 9:00 to 12:00  A10.  Representative lesion within 12:00 to 3:00 quadrant  B(2). Other, Right Posterior Scalp lesion - Posterior Lateral Margin:  The specimen is received fresh for frozen section, with proper patient identification, labeled \"right posterior scalp lesion-posterior lateral margin\".  It consists of a 4.0 x 0.2 cm strip of skin excised to a depth of 0.6 cm.  The specimen is bisected.  The adjoining segments are inked blue.  The specimen is entirely submitted in B1, frozen section remnant.  C(3). Other, RIght Posterior Scalp Lesion - Posterior Medial Margin:  The specimen is received fresh for                           frozen section, with proper patient identification, labeled \"right posterior scalp lesion-posterior medial margin\".  It consists of a 3.0 x 0.5 cm strip of skin excised to a depth of 0.3 cm.  The specimen is submitted entirely for frozen section.  The remainder of the frozen " "section block is submitted as C1 FS.    D(4). Other, Right Posterior Scalp Lesion - Anterior Lateral Margin:  The specimen is received fresh for frozen section, with proper patient identification, labeled \"right posterior scalp lesion-anterior lateral margin\".  It consists of a 3.0 x 0.2 cm strip of skin excised to a depth of 0.5 cm.  The specimen is bisected.  The adjoining segments are inked blue.  The specimen is entirely submitted in D1, frozen section remnant.  E(5). Other, Right Posterior Scalp Lesion - Anterior Medial Margin:  The specimen is received fresh for frozen section, with proper patient identification, labeled \"right posterior scalp lesion-anterior medial margin\".  It consists of a 1.7 x 0.4 x 0.3                           cm strip of skin and subcutaneous tissue.  The specimen is submitted entirely for frozen section.  The remainder of the frozen section block is submitted as E1FS.    F(6). Other, Right Anterior Scalp Lesion - Single Stitch ANTERIOR, Double Stitch LATERAL:  The specimen is received in formalin with proper patient identification, labeled \"right anterior scalp lesion-single stitch anterior, double stitch lateral\".  The specimen consists of a 3.0 cm 3:00 to 9:00 by 2.5 cm 12:00 to 6:00 ovoid skin excision excised to a depth of 0.7 cm.  There is a single stitch designated as anterior, and a double stitch designated lateral.  Anterior is redesignated as 12:00.  Lateral is redesignated as 3:00.  The specimen is inked as follows:  12:00 to 3:00-blue  3:00 to 6:00-orange  6:00 to 9:00-green  9:00 to 12:00-yellow  Deep-black  The periosteum is retracted, and only the deep margin is inked black.  There is a 1.7 x 1.1 x 0.2 cm irregularly shaped, brown, friable skin lesion.  The lesion is 0.4 cm                           to 9:00, 0.7 cm to 6:00, 0.7 cm to 12:00, and 0.9 cm to 3:00,.  The lesion is 0.7 cm to the deep margin.  The remaining skin is white-tan and hairbearing.  The specimen is " "serially sectioned from 12:00 to 6:00.  The specimen is entirely submitted in F1-F8.    Summary of sections:  F1.  12:00 edge  F2.  6:00 edge  F3-F8.  Remainder of specimen, sequentially submitted from 12:00 towards 6:00  G(7). Other, Right Anterior Scalp Lesion - Posterior Lateral Margin:  The specimen is received fresh for frozen section, with proper patient identification, labeled \"right anterior scalp lesion-posterior lateral margin\".  It consists of a 2.3 x 0.2 cm strip of skin and subcutaneous tissue excised to a depth of 0.5 cm.  The specimen is submitted entirely for frozen section.  The remainder of the frozen section block is submitted as G1 FS.    H(8). Other, Right Anterior Scalp Lesion - Posterior Medial Margin:  The specimen is received fresh for frozen section, with proper patient identification,                           labeled \"right anterior scalp lesion-posterior medial margin\".  It consists of a 2.5 x 0.2 x 0.3 cm strip of skin and subcutaneous tissue.  The specimen is submitted entirely for frozen section.  The remainder of the frozen section block is submitted as H1 FS.    I(9). Other, Right Anterior Scalp Lesion - Anterior Lateral Margin:  The specimen is received fresh for frozen section, with proper patient identification, labeled \"right anterior scalp lesion-anterior lateral margin\".  It consists of a 2.2 x 0.2 cm strip of skin and subcutaneous tissue excised to a depth of 0.3 cm.  The specimen is submitted entirely for frozen section.  The remainder of the frozen section block is submitted as I1 FS.    J(10). Other, Right Anterior Scalp Lesion - Anterior Medial Margin:  The specimen is received fresh for frozen section, with proper patient identification, labeled \"right anterior scalp lesion-anterior medial margin\".  It consists of a 2.5 x 0.5 cm strip of skin and subcutaneous tissue                           excised to a depth of 0.2 cm.  The specimen is submitted entirely for frozen " "section.  The remainder of the frozen section block is submitted as J1 FS.    K(11). Other, Right Anterior Scalp Lesion - Revision Resection of Posterior Medial Margin:  The specimen is received in formalin with proper patient identification, labeled \"right anterior scalp lesion-revision resection of posterior medial margin\".  The specimen consists of a 3.0 x 0.5 cm strip of white-tan skin excised to a depth of 0.5 cm.  The specimen is inked black, serially sectioned, and entirely submitted in K1-K3.   L(12). Other, Right Anterior Scalp Lesion - Revision Resection of Posterior Medial Margin:  The specimen is received fresh for frozen section, with proper patient identification, labeled \"right anterior scalp lesion-revision resection of posterior medial margin\".  It consists of a 2.7 x 0.7 x 0.2 cm strip of skin and subcutaneous tissue.  The specimen is bisected.  The adjoining segments are inked blue.  The                           specimen is entirely submitted in L1, frozen section remnant.      Microscopic Description 01/21/2025    Final                    Value:A. The specimen exhibits flattened epidermis, dermal fibrosis and lymphohistiocytic inflammation consistent with scar from the prior procedure.  Changes of actinic keratosis are present throughout this excision specimen, however no residual squamous cell carcinoma is identified.    B.  The specimen exhibits a normal-appearing epidermis above solar elastosis and papillary dermal fibrosis.  There is no evidence of malignancy.    C.  The specimen exhibits changes of actinic keratosis, but no features of residual squamous cell carcinoma.    D.  The specimen exhibits changes of actinic keratosis, but no features of residual squamous cell carcinoma.    E.  The specimen exhibits epidermal hyperplasia and keratinocyte atypia consistent with hypertrophic actinic keratosis (as well as cautery artifact-type changes), but fails to demonstrate definite features of " carcinoma in situ or invasive squamous cell carcinoma.    F.  The specimen exhibits epidermal changes of actinic keratosis (without                           evidence of squamous cell carcinoma), above an expanded dermis which contains a population of pleomorphic, atypical spindled, epithelioid and occasionally bizarre cells.  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    G.  The specimen exhibits mild epidermal hyperplasia, solar elastosis and papillary dermal fibrosis.  No residual squamous cell carcinoma is identified.    H.  The specimen exhibits epidermal changes of actinic keratosis and scar from the prior surgical procedure, as well as a proliferation of atypical, pleomorphic spindled cells within the dermis which label with CD10 immunostain and rarely with p63, and are negative on cytokeratin AE1/AE3, cytokeratin 5 and SOX10 immunostains.    I. The specimen exhibits changes of actinic keratosis, but no features of                           residual squamous cell carcinoma or pleomorphic dermal sarcoma.    J.  The specimen exhibits epidermal changes of actinic keratosis, prominent solar elastosis and deep dermal fibrosis and cautery artifact.  There is no evidence of malignancy on multiple level sections or on CD10, p63, cytokeratin AE1/AE3, CK5 or SOX10 immunostains.    K.  The specimen exhibits epidermal changes of actinic keratosis (with no definite features of residual squamous cell carcinoma), above dermal fibrosis consistent with scar, and an atypical spindle cell proliferation with pleomorphism and hyperchromasia within the deep dermis and subcutis.  Lesional spindled cells label with CD10 and are negative on cytokeratin AE1/AE3, cytokeratin 5, p63 and SOX10 immunostains.  The lesion  is present at the lateral and deep inked margins.    L. The specimen exhibits changes of actinic keratosis and scar, but no features of residual squamous cell carcinoma or pleomorphic dermal sarcoma.    Case was reviewed by the                           following:  Intraoperative Resident/Fellow: Seth Bowman MD  Resident Pathologist: Charis Torres MD  A resident or fellow in a training program was involved in the initial review, preparation, and/or interpretation of this case.  I, as the senior physician, attest that I have personally reviewed all specimens and or slides, including the listed special stains, and used them with my medical judgement to determine the final diagnosis.              MCRS 01/21/2025 Yes (A)  N/A Final    Performing Labs 01/21/2025    Final                    Value:The technical component of this testing was completed at [facility].    Stain controls for all stains resulted within this report have been reviewed and show appropriate reactivity.      Office Visit on 01/14/2025   Component Date Value Ref Range Status    WBC Count 01/14/2025 4.5  4.0 - 11.0 10e3/uL Final    RBC Count 01/14/2025 4.91  4.40 - 5.90 10e6/uL Final    Hemoglobin 01/14/2025 15.2  13.3 - 17.7 g/dL Final    Hematocrit 01/14/2025 46.1  40.0 - 53.0 % Final    MCV 01/14/2025 94  78 - 100 fL Final    MCH 01/14/2025 31.0  26.5 - 33.0 pg Final    MCHC 01/14/2025 33.0  31.5 - 36.5 g/dL Final    RDW 01/14/2025 13.8  10.0 - 15.0 % Final    Platelet Count 01/14/2025 128 (L)  150 - 450 10e3/uL Final    Sodium 01/14/2025 142  135 - 145 mmol/L Final    Potassium 01/14/2025 4.3  3.4 - 5.3 mmol/L Final    Chloride 01/14/2025 105  98 - 107 mmol/L Final    Carbon Dioxide (CO2) 01/14/2025 27  22 - 29 mmol/L Final    Anion Gap 01/14/2025 10  7 - 15 mmol/L Final    Urea Nitrogen 01/14/2025 16.6  8.0 - 23.0 mg/dL Final    Creatinine 01/14/2025 1.15  0.67 - 1.17 mg/dL Final     GFR Estimate 01/14/2025 63  >60 mL/min/1.73m2 Final    Calcium 01/14/2025 9.8  8.8 - 10.4 mg/dL Final    Glucose 01/14/2025 86  70 - 99 mg/dL Final   Hospital Outpatient Visit on 11/06/2024   Component Date Value Ref Range Status    Creatinine POCT 11/06/2024 1.3  0.7 - 1.3 mg/dL Final    GFR, ESTIMATED POCT 11/06/2024 55 (L)  >60 mL/min/1.73m2 Final         Total time spent for face to face visit, reviewing labs/imaging studies, counseling and coordination of care was: 30 Minutes spent on the date of the encounter doing chart review, review of outside records, review of test results, interpretation of tests, patient visit, and documentation     The longitudinal plan of care for the diagnosis(es)/condition(s) as documented were addressed during this visit. Due to the added complexity in care, I will continue to support Michael in the subsequent management and with ongoing continuity of care.    This note was dictated using voice recognition software.  Any grammatical or context distortions are unintentional and inherent to the software.    Orders Placed This Encounter   Procedures    Keppra (Levetiracetam) Level    Basic metabolic panel      New Prescriptions    No medications on file     Modified Medications    Modified Medication Previous Medication    LEVETIRACETAM (KEPPRA) 500 MG TABLET levETIRAcetam (KEPPRA) 500 MG tablet       Take 1 tablet (500 mg) by mouth 2 times daily.    Take 1 tablet (500 mg) by mouth 2 times daily

## 2025-02-03 ENCOUNTER — OFFICE VISIT (OUTPATIENT)
Dept: OTOLARYNGOLOGY | Facility: CLINIC | Age: 85
End: 2025-02-03
Payer: COMMERCIAL

## 2025-02-03 VITALS
WEIGHT: 199.5 LBS | SYSTOLIC BLOOD PRESSURE: 155 MMHG | DIASTOLIC BLOOD PRESSURE: 88 MMHG | HEART RATE: 65 BPM | OXYGEN SATURATION: 99 % | BODY MASS INDEX: 29.55 KG/M2 | HEIGHT: 69 IN

## 2025-02-03 DIAGNOSIS — C44.42 SQUAMOUS CELL CARCINOMA OF SCALP: Primary | ICD-10-CM

## 2025-02-03 PROCEDURE — 99212 OFFICE O/P EST SF 10 MIN: CPT | Performed by: STUDENT IN AN ORGANIZED HEALTH CARE EDUCATION/TRAINING PROGRAM

## 2025-02-03 ASSESSMENT — PAIN SCALES - GENERAL: PAINLEVEL_OUTOF10: NO PAIN (0)

## 2025-02-03 NOTE — PATIENT INSTRUCTIONS
You were seen in the ENT Clinic today by Dr. Chaves. If you have any questions or concerns after your appointment, please contact us (see below)    Please return to clinic in 1 month for follow up with Dr. Chaves     How to Contact Us:  Send a [employer] message to your provider. Our team will respond to you via [employer]. Occasionally, we will need to call you to get further information.  For urgent matters (Monday-Friday), call the ENT Clinic: [phone] and speak with a call center team member - they will route your call appropriately.   If you'd like to speak directly with a nurse, please find our contact information below. We do our best to check voicemail frequently throughout the day, and will work to call you back within 1-2 days. For urgent matters, please use the general clinic phone numbers listed above.    Jackie TAPIA RN, BSN  RN Care Coordinator, ENT Clinic  [facility]  Direct: [phone]

## 2025-02-03 NOTE — PROGRESS NOTES
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 Initial diagnosis SCC. Final margins clear.     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  We reviewed this at her multidisciplinary head neck conference.  Typically would recommend adjuvant radiation therapy.    I reviewed this with the patient and his son today.  Due to his other medical comorbidities I think he would be very high risk.  I would anticipate he would need free tissue reconstruction of the scalp defect to tolerate radiation therapy.     He has a hard time sitting still to remove the dressing and he tells me that he required general anesthesia for each radiation treatment for his parotid cancer.  I think this is too risky for an 84-year-old.    Him and his family will discuss this and will get back to us.    I will plan to see him back in 4 weeks to remove the silicone layer.    Lucas Chaves MD      30 minutes spent on the date of the encounter in chart review, patient visit, review of tests, documentation and/or discussion with other providers about the issues documented above.          [Headache] : headache [Feeling Fatigued] : feeling fatigued [Eyeglasses] : currently wearing eyeglasses [Sore Throat] : sore throat [Shortness Of Breath] : shortness of breath [Sinus Pressure] : sinus pressure [Chest  Pressure] : no chest pressure [Abdominal Pain] : abdominal pain [Urinary Frequency] : urinary frequency [Impotence] : impotence [Joint Pain] : joint pain [Lower Back Pain] : lower back pain [Skin: A Rash] : rash: [Itching] : itching [Negative] : Heme/Lymph

## 2025-02-03 NOTE — LETTER
2/3/2025       RE: Michael Maldonado  [address]  [address]  [place] [address]     Dear Colleague,    Thank you for referring your patient, Michael Maldonado, to the [facility] [place] at [facility]. Please see a copy of my visit note below.    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 Initial diagnosis SCC. Final margins clear.     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  We reviewed this at her multidisciplinary head neck conference.  Typically would recommend adjuvant radiation therapy.    I reviewed this with the patient and his son today.  Due to his other medical comorbidities I think he would be very high risk.  I would anticipate he would need free tissue reconstruction of the scalp defect to tolerate radiation therapy.     He has a hard time sitting still to remove the dressing and he tells me that he required general anesthesia for each radiation treatment for his parotid cancer.  I think this is too risky for an 84-year-old.    Him and his family will discuss this and will get back to us.    I will plan to see him back in 4 weeks to remove the silicone layer.    Lucas Chaves MD      30 minutes spent on the date of the encounter in chart review, patient visit, review of tests, documentation and/or discussion with other  providers about the issues documented above.           Again, thank you for allowing me to participate in the care of your patient.      Sincerely,    Lucas Chaves MD

## 2025-02-05 ENCOUNTER — OFFICE VISIT (OUTPATIENT)
Dept: NEUROLOGY | Facility: CLINIC | Age: 85
End: 2025-02-05
Payer: COMMERCIAL

## 2025-02-05 VITALS
BODY MASS INDEX: 29.47 KG/M2 | HEIGHT: 69 IN | SYSTOLIC BLOOD PRESSURE: 122 MMHG | WEIGHT: 199 LBS | HEART RATE: 78 BPM | DIASTOLIC BLOOD PRESSURE: 66 MMHG

## 2025-02-05 DIAGNOSIS — G40.209 PARTIAL COMPLEX SEIZURE DISORDER WITHOUT INTRACTABLE EPILEPSY (H): Primary | ICD-10-CM

## 2025-02-05 PROBLEM — H90.3 SENSORINEURAL HEARING LOSS, BILATERAL: Status: ACTIVE | Noted: 2025-02-05

## 2025-02-05 PROCEDURE — G2211 COMPLEX E/M VISIT ADD ON: HCPCS | Performed by: PSYCHIATRY & NEUROLOGY

## 2025-02-05 PROCEDURE — 99214 OFFICE O/P EST MOD 30 MIN: CPT | Performed by: PSYCHIATRY & NEUROLOGY

## 2025-02-05 RX ORDER — LEVETIRACETAM 500 MG/1
500 TABLET ORAL 2 TIMES DAILY
Qty: 180 TABLET | Refills: 3 | Status: SHIPPED | OUTPATIENT
Start: 2025-02-05

## 2025-02-05 NOTE — LETTER
2025      Michael Maldonado   [facility] [address] [place] [address]      Dear Colleague,    Thank you for referring your patient, Michael Maldonado, to the [facility] [place]. Please see a copy of my visit note below.    NEUROLOGY FOLLOW UP VISIT  NOTE       [facility] [place]  [address] [place] [address]  Tel: [phone]  Fax: [phone]  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  He has remained seizure-free.  I have recommended:    1.  Continue Keppra 500 mg twice daily.  Prescriptions refilled  2.  Check Keppra level and basic metabolic panel  3.  Follow-up in 1 year    Thank you again for this referral, please feel free to contact me if you have any questions.    Carlitos Finch MD  [facility], [place]     HISTORY OF PRESENT ILLNESS     Patient is a pleasant 84-year-old gentleman with history of HTN, HLD, alcohol abuse, hypothyroidism, SNHL, cerebral AV malformation s/p craniotomy in , parotid cancer last seen on 2024 for complex partial seizures who returns for follow-up.  Previous workup included an EEG that showed left parasagittal sharp activity and was started on Keppra with good control of his symptoms.  During his last visit he was continued on Keppra 500 mg twice daily and anticonvulsant level were checked that was therapeutic at 17.9.Since his last visit he was admitted to the hospital for excision of cutaneous squamous cell carcinoma.  He had wide excision of  scalp lesion right anterior measuring 4 x 4 cm right posterior measuring 6 x 6 cm.  He denies any seizures.    [place] patient is a male with history of HTN, HLD, EtOH abuse, SNHL, cerebral AV malformation s/p craniotomy in 1970, parotid gland cancer who was initially seen on 11/15/2022 after an evaluation in the ER for an episode of confusion. According to wife he had acute onset when he was asking questions repetitively initially when he presented to the emergency room he had a CT of the head and CTA that was unremarkable aside from some chronic changes.  Although admission was recommended he wanted to go home.  He was started on aspirin and simvastatin and subsequently had an EEG that was abnormal showing left parasagittal sharp discharges suggesting low threshold for seizure.  30-day event monitor and echocardiogram were normal.  He was started on Keppra.     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    Surgical History:  He  has a past surgical history that includes Craniotomy For Avm (1970); Total Knee Arthroplasty (Left, 2013); Salivary gland surgery (Right, 2011); joint replacement; TOTAL KNEE ARTHROPLASTY (Right,  "1/7/2019); and Excise lesion head (Right, 1/21/2025).     SOCIAL HISTORY     Reviewed, and he  reports that he quit smoking about 25 years ago. His smoking use included cigars. He has never been exposed to tobacco smoke. He has never used smokeless tobacco. He reports current alcohol use of about 1.0 standard drink of alcohol per week. He reports that he does not use drugs.     FAMILY HISTORY     Reviewed, and family history includes Cirrhosis in his father; Diabetes in his father.     ALLERGIES     No Known Allergies      REVIEW OF SYSTEMS     A 12 point review of system was performed and was negative except as outlined in the history of present illness.     HOME MEDICATIONS     Current Outpatient Rx   Medication Sig Dispense Refill     amLODIPine (NORVASC) 5 MG tablet TAKE 1 TABLET BY MOUTH EVERY DAY 90 tablet 1     aspirin (ASA) 81 MG EC tablet Take 1 tablet by mouth every other day       fluorouracil (EFUDEX) 5 % external cream APPLY 1 APPLICATION TOPICALLY TO SCALP AND FOREHEAD       levETIRAcetam (KEPPRA) 500 MG tablet Take 1 tablet (500 mg) by mouth 2 times daily. 180 tablet 3     levothyroxine (SYNTHROID/LEVOTHROID) 88 MCG tablet Take 1 tablet (88 mcg) by mouth daily. 90 tablet 3     losartan (COZAAR) 50 MG tablet Take 1 tablet (50 mg) by mouth daily. 90 tablet 3     omeprazole (PRILOSEC) 20 MG DR capsule TAKE 1 CAPSULE BY MOUTH TWICE DAILY BEFORE MEALS 180 capsule 2     oxyCODONE (ROXICODONE) 5 MG tablet Take 1-2 tablets (5-10 mg) by mouth every 4 hours as needed for moderate to severe pain. 10 tablet 0     simvastatin (ZOCOR) 80 MG tablet TAKE 1/2 TABLET BY MOUTH EVERY DAY 45 tablet 2         PHYSICAL EXAM     Vital signs  /66 (BP Location: Left arm, Patient Position: Sitting)   Pulse 78   Ht 1.753 m (5' 9\")   Wt 90.3 kg (199 lb)   BMI 29.39 kg/m      Weight:   199 lbs 0 oz    Elderly gentleman who is alert and oriented vital signs were reviewed and documented in electronic medical record.  Neck " was supple.  He has facial deformity due to previous surgery cranial nerves are intact except for sensorineural hearing loss.  He has normal mass and tone with 5/5 strength reflexes 1+ toes equivocal.  He has a wide-based gait and has trouble tandem walking.  Sensation intact with no double simultaneous extinction     PERTINENT DIAGNOSTIC STUDIES     Following studies were reviewed:     CT BRAIN 5/17/2023  1.  Stable head CT compared with 11/10/2022, without acute intracranial abnormality.  2.  Stable postoperative changes related to prior left parietal craniotomy, with underlying broad encephalomalacia and gliosis and partial resection of the posterior body and splenium of the corpus callosum.  3.  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    CTA HEAD & NECK 11/10/2022  HEAD CT:  1.  No finding for intracranial hemorrhage or mass or convincing finding for acute infarct.     2.  Postsurgical changes relating to prior left parietal craniotomy. Broad area of encephalomalacic change gliosis is seen within the subjacent left frontal lobe and surgical clips are seen in the region of the pineal gland.     3.  There is marked dilatation of the atrium of the left lateral ventricle with an apparent arachnoid cyst bowing the septum pellucidum to the right. There is prominent enlargement of the left temporal horn and occipital horn without enlargement of the   left frontal horn suggesting postobstructive dilatation of the temporal and occipital horns.     4.  If prior studies can be provided for comparison, a comparison will be made and an addendum issued. If no prior studies are available for comparison, consider correlation with MRI for further assessment.     5.  Mild volume loss and presumed sequela chronic microvascular ischemic change.     HEAD CTA:    1. No finding for vascular cutoff, aneurysm, or flow-limiting stenosis.  2. Coarse atherosclerotic plaque is seen within both carotid siphons without significant associated luminal stenosis.     NECK CTA:  1.  Moderate atherosclerotic plaque right carotid bulb/bifurcation with mild associated luminal narrowing.     2.  Coarse atherosclerotic plaque is seen within the proximal right vertebral artery with more mild atherosclerotic plaque on the left. There is moderate or high-grade narrowing of the proximal right vertebral artery which, where visualized, is similar   to the 1/22/2019 CTA chest.     EEG 11/23/2022  This is an abnormal EEG due to asymmetric slowing of the background that is maximally expressed on the left hemisphere. Asymmetry of the amplitude likely is due to breach effect. Additionally polyspike discharges are noted in the left parasagittal head region with phase reversal at C3 that suggest a low threshold for seizures.      30-DAY EVENT MONITOR 12/28/2022  Cardiac event monitoring from 12/28/2022 to 1/26/2023 (monitored duration 16d 19h 17m).  Baseline rhythm was sinus rhythm.    Reported heart rate range 50 to 120bpm, average 71bpm.  No symptom triggers.  Seven automated recordings included sinus rhythm with 1st degree AV delay and rare PACs and PVCs.  No sustained tachyarrhythmias.  No atrial fibrillation.  There were no pauses noted.  Supraventricular and ventricular ectopic beat frequency are not reported on this monitoring modality.       ECHOCARDIOGRAM 12/21/2022  The left ventricle is normal in size with mild concentric left ventricular  hypertrophy.  Left ventricular function is normal.The ejection fraction is 60-65%.  Normal right ventricle size and systolic function.  A contrast injection (Bubble Study) was performed that was negative for flow  across the interatrial septum.  Mild age-related valve disease is present without significant stenosis or  regurgitation.  There is no comparison  study available.     PERTINENT LABS  Following labs were reviewed:  Admission on 01/21/2025, Discharged on 01/21/2025   Component Date Value Ref Range Status     GLUCOSE BY METER POCT 01/21/2025 106 (H)  70 - 99 mg/dL Final     Case Report 01/21/2025    Final                    Value:Surgical Pathology Report                         Case: [id number]                                  Authorizing Provider:  Lucas Chaves MD  Collected:           01/21/2025 08:44 AM          Ordering Location:     [facility] OR                 Received:            01/21/2025 03:41 PM          Pathologist:           Linwood Cunningham MD                                                         Intraop:               Valentino Cardoza MD                                                             Specimens:   A) - Other, Wide Local excision Right Posterior Scalp Lesion - Single Stitch                        ANTERIOR, Double Stitch LATERAL                                                                     B) - Other, Right Posterior Scalp lesion - Posterior Lateral Margin                                 C) - Other, RIght Posterior Scalp Lesion - Posterior Medial Margin                                  D) - Other, Right Posterior Scalp Lesion - Anterior Lateral Margin                                                            E) - Other, Right Posterior Scalp Lesion - Anterior Medial Margin                                   F) - Other, Right Anterior Scalp Lesion - Single Stitch ANTERIOR, Double Stitch                     LATERAL                                                                                             G) - Other, Right Anterior Scalp Lesion - Posterior Lateral Margin                                  H) - Other, Right Anterior Scalp Lesion - Posterior Medial Margin                                   I) - Other, Right Anterior Scalp Lesion - Anterior Lateral Margin                                   J) - Other, Right  Anterior Scalp Lesion - Anterior Medial Margin                                    K) - Other, Right Anterior Scalp Lesion - Revision Resection of Posterior Medial                    Margin                                                                                              L) - Other, Right Anterior Scalp Lesion - Revision Resection of Posterior Medial                                              Margin                                                                                      Final Diagnosis 01/21/2025    Final                    Value:A. Wide local excision right posterior scalp lesion - single stitch anterior, double stitch lateral:  - Scar from the prior surgical procedure, with no evidence of residual lesion - (see description)    B. Right posterior scalp lesion - posterior lateral margin:  - Negative for malignancy - (see description)    C. RIght posterior scalp lesion - posterior medial margin:  - Negative for malignancy - (see description)    D. Right posterior scalp lesion - anterior lateral margin:  - Negative for malignancy - (see description)    E. Right posterior scalp lesion - anterior medial margin:  - Negative for malignancy - (see description)    F. Right anterior scalp lesion - single stitch anterior, double stitch lateral:  - Atypical spindle cell proliferation, consistent with pleomorphic dermal sarcoma - (see description)  - Tumor is present at the 6:00-9:00 and 9:00-12:00 inked margins    G. Right anterior scalp lesion - posterior lateral margin:  - Negative for malignancy - (see                           description)    H. 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 Right anterior scalp lesion - anterior lateral margin:  - Negative for malignancy - (see description)    J. Right anterior scalp lesion - anterior medial margin:  - Negative for malignancy - (see description)     K. 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 Right anterior scalp lesion - revision resection of posterior medial margin:  - Negative for malignancy - (see description)       Comment 01/21/2025    Final                    Value:This case was reviewed at Dermatopathology consensus conference on 1/29/2025.  Intradepartmental consultation with Soft Tissue pathology was also obtained.       Clinical Information 01/21/2025    Final                    Value:The patient is an 84 year old male.         Intraoperative Consultation 01/21/2025    Final                    Value:B(2). Other, Right Posterior Scalp lesion - Posterior Lateral Margin:  BFS1:  - Negative for carcinoma    Valentino Cardoza MD on 1/21/2025 at 9:55 AM  Intra op performed at: LABORATORY, [facility], [address] [facility], [address] [place] [id number]  Intra-op Dx verbally delivered to Dr. Anastacio RAINEY(3). Other, RIght Posterior Scalp Lesion - Posterior Medial Margin:  CFS1:  - Negative for carcinoma    Valentino Cardoza MD on 1/21/2025 at 9:55 AM  Intra op performed at: LABORATORY, [facility], [address] [facility], [facility], [address], [facility] [address]  Intra-op Dx verbally delivered to Dr. Anastacio IVAN(4). Other, Right Posterior Scalp Lesion - Anterior Lateral Margin:  DFS1:  - Negative for carcinoma    Valentino Cardoza MD on 1/21/2025 at 9:55 AM  Intra op performed at: LABORATORY, [facility], [address] [facility], [facility], [address], [facility] [address]  Intra-op Dx verbally delivered to Dr. Anastacio REINA(5).                           Other, Right Posterior Scalp Lesion - Anterior Medial Margin:  EFS1:  - Suspicious for high-grade dysplasia    Valentino Cardoza MD on 1/21/2025 at 9:55 AM  Intra op performed at: LABORATORY, [facility], [address] [facility]. , [address]  Building, [address],  [facility] [address]  Intra-op Dx verbally delivered to Dr. Anastacio ESQUIVEL(7). Other, Right Anterior Scalp Lesion - Posterior Lateral Margin:  GFS1:  - Negative for carcinoma    Valentino Cardoza MD on 1/21/2025 at 9:55 AM  Intra op performed at: LABORATORY, [facility], [address] [facility], [address], [facility] [address]  Intra-op Dx verbally delivered to Dr. Anastacio CANTOR(8). Other, Right Anterior Scalp Lesion - Posterior Medial Margin:  HFS1:  - Positive for malignancy    Valentino Cardoza MD on 1/21/2025 at 9:55 AM  Intra op performed at: LABORATORY, [facility], [address] [facility], [facility], [address], [facility] [address]  Intra-op Dx verbally delivered to Dr. Anastacio SOLER(9). Other, Right Anterior Scalp Lesion - Anterior Lateral Margin:  IFS1:  - Negative for carcinoma    Valentino Cardoza MD on 1/21/2025 at 9:55 AM  Intra op performed at: LABORATORY, [facility], [address] [facility], [address]  [address], [address], [facility] [address]  Intra-op Dx verbally delivered to Dr. Anastacio GARCIA(10). Other, Right Anterior Scalp Lesion - Anterior Medial Margin:  JFS1:  - Negative for carcinoma    Valentino Cardoza MD on 1/21/2025 at 9:55 AM  Intra op performed at: LABORATORY, [facility], [address] [facility], [address], [address], [facility] [address]  Intra-op Dx verbally delivered to Dr. Anastacio BIRMINGHAM(12). Other, Right Anterior Scalp Lesion - Revision Resection of Posterior Medial Margin:  LFS1:  - Negative for carcinoma    Valentino Cardoza MD on 1/21/2025 at 10:47 AM  Intra op performed at: LABORATORY, [facility], [address] [facility]. , [address], [address], [facility] [address]  Intra-op Dx verbally delivered                           to Dr. Anastacio Ramírez Description 01/21/2025    Final                    Value:A(1). Other, Wide Local excision Right Posterior Scalp Lesion - Single Stitch  "ANTERIOR, Double Stitch LATERAL:  The specimen is received in formalin with proper patient identification, labeled \"wide local excision right posterior scalp lesion-single stitch anterior, double stitch lateral\".  The specimen consists of a 4.6 cm 12:00 to 6:00 by 4.3 cm 3:00 to 9:00 ovoid skin excision excised to a depth of 0.6 cm.  There is a single stitch designated as anterior, and a double stitch designated as a lateral.  Anterior is redesignated as 12:00.  Lateral is redesignated as 3:00.  The specimen is inked as follows:  12:00 to 3:00-blue  3:00 to 6:00-orange  6:00 to 9:00-green  9:00 to 12:00-yellow  Deep-black  The periosteum is retracted, and the true deep margin is inked black.  There is a 4.0 x 3.5 x 0.4 cm irregularly shaped, white-tan, friable skin lesion.  The lesion abuts 6:00 and 9:00.  The lesion is 1.0 cm to 3:00 and 1.0 cm 12:00.  The lesion is 0.5 cm to the deep margin.  A gross                           photograph is taken.  Representative sections are submitted in A1-A10, including all peripheral margins.    Summary of sections:  A1.  Lesion with closest 12:00 to 3:00  A2.  Remainder of 12:00 to 3:00, en face  A3.  Lesion with closest 3:00 to 6:00   A4.  Remainder of 3:00 to 6:00, en face  A5-A6.  Lesion with entire perpendicular 6:00 to 9:00  A7-A9.  Lesion with entire perpendicular 9:00 to 12:00  A10.  Representative lesion within 12:00 to 3:00 quadrant  B(2). Other, Right Posterior Scalp lesion - Posterior Lateral Margin:  The specimen is received fresh for frozen section, with proper patient identification, labeled \"right posterior scalp lesion-posterior lateral margin\".  It consists of a 4.0 x 0.2 cm strip of skin excised to a depth of 0.6 cm.  The specimen is bisected.  The adjoining segments are inked blue.  The specimen is entirely submitted in B1, frozen section remnant.  C(3). Other, RIght Posterior Scalp Lesion - Posterior Medial Margin:  The specimen is received fresh for        " "                   frozen section, with proper patient identification, labeled \"right posterior scalp lesion-posterior medial margin\".  It consists of a 3.0 x 0.5 cm strip of skin excised to a depth of 0.3 cm.  The specimen is submitted entirely for frozen section.  The remainder of the frozen section block is submitted as C1 FS.    D(4). Other, Right Posterior Scalp Lesion - Anterior Lateral Margin:  The specimen is received fresh for frozen section, with proper patient identification, labeled \"right posterior scalp lesion-anterior lateral margin\".  It consists of a 3.0 x 0.2 cm strip of skin excised to a depth of 0.5 cm.  The specimen is bisected.  The adjoining segments are inked blue.  The specimen is entirely submitted in D1, frozen section remnant.  E(5). Other, Right Posterior Scalp Lesion - Anterior Medial Margin:  The specimen is received fresh for frozen section, with proper patient identification, labeled \"right posterior scalp lesion-anterior medial margin\".  It consists of a 1.7 x 0.4 x 0.3                           cm strip of skin and subcutaneous tissue.  The specimen is submitted entirely for frozen section.  The remainder of the frozen section block is submitted as E1FS.    F(6). Other, Right Anterior Scalp Lesion - Single Stitch ANTERIOR, Double Stitch LATERAL:  The specimen is received in formalin with proper patient identification, labeled \"right anterior scalp lesion-single stitch anterior, double stitch lateral\".  The specimen consists of a 3.0 cm 3:00 to 9:00 by 2.5 cm 12:00 to 6:00 ovoid skin excision excised to a depth of 0.7 cm.  There is a single stitch designated as anterior, and a double stitch designated lateral.  Anterior is redesignated as 12:00.  Lateral is redesignated as 3:00.  The specimen is inked as follows:  12:00 to 3:00-blue  3:00 to 6:00-orange  6:00 to 9:00-green  9:00 to 12:00-yellow  Deep-black  The periosteum is retracted, and only the deep margin is inked black.  There " "is a 1.7 x 1.1 x 0.2 cm irregularly shaped, brown, friable skin lesion.  The lesion is 0.4 cm                           to 9:00, 0.7 cm to 6:00, 0.7 cm to 12:00, and 0.9 cm to 3:00,.  The lesion is 0.7 cm to the deep margin.  The remaining skin is white-tan and hairbearing.  The specimen is serially sectioned from 12:00 to 6:00.  The specimen is entirely submitted in F1-F8.    Summary of sections:  F1.  12:00 edge  F2.  6:00 edge  F3-F8.  Remainder of specimen, sequentially submitted from 12:00 towards 6:00  G(7). Other, Right Anterior Scalp Lesion - Posterior Lateral Margin:  The specimen is received fresh for frozen section, with proper patient identification, labeled \"right anterior scalp lesion-posterior lateral margin\".  It consists of a 2.3 x 0.2 cm strip of skin and subcutaneous tissue excised to a depth of 0.5 cm.  The specimen is submitted entirely for frozen section.  The remainder of the frozen section block is submitted as G1 FS.    H(8). Other, Right Anterior Scalp Lesion - Posterior Medial Margin:  The specimen is received fresh for frozen section, with proper patient identification,                           labeled \"right anterior scalp lesion-posterior medial margin\".  It consists of a 2.5 x 0.2 x 0.3 cm strip of skin and subcutaneous tissue.  The specimen is submitted entirely for frozen section.  The remainder of the frozen section block is submitted as H1 FS.    I(9). Other, Right Anterior Scalp Lesion - Anterior Lateral Margin:  The specimen is received fresh for frozen section, with proper patient identification, labeled \"right anterior scalp lesion-anterior lateral margin\".  It consists of a 2.2 x 0.2 cm strip of skin and subcutaneous tissue excised to a depth of 0.3 cm.  The specimen is submitted entirely for frozen section.  The remainder of the frozen section block is submitted as I1 FS.    J(10). Other, Right Anterior Scalp Lesion - Anterior Medial Margin:  The specimen is received fresh " "for frozen section, with proper patient identification, labeled \"right anterior scalp lesion-anterior medial margin\".  It consists of a 2.5 x 0.5 cm strip of skin and subcutaneous tissue                           excised to a depth of 0.2 cm.  The specimen is submitted entirely for frozen section.  The remainder of the frozen section block is submitted as J1 FS.    K(11). Other, Right Anterior Scalp Lesion - Revision Resection of Posterior Medial Margin:  The specimen is received in formalin with proper patient identification, labeled \"right anterior scalp lesion-revision resection of posterior medial margin\".  The specimen consists of a 3.0 x 0.5 cm strip of white-tan skin excised to a depth of 0.5 cm.  The specimen is inked black, serially sectioned, and entirely submitted in K1-K3.   L(12). Other, Right Anterior Scalp Lesion - Revision Resection of Posterior Medial Margin:  The specimen is received fresh for frozen section, with proper patient identification, labeled \"right anterior scalp lesion-revision resection of posterior medial margin\".  It consists of a 2.7 x 0.7 x 0.2 cm strip of skin and subcutaneous tissue.  The specimen is bisected.  The adjoining segments are inked blue.  The                           specimen is entirely submitted in L1, frozen section remnant.       Microscopic Description 01/21/2025    Final                    Value:A. The specimen exhibits flattened epidermis, dermal fibrosis and lymphohistiocytic inflammation consistent with scar from the prior procedure.  Changes of actinic keratosis are present throughout this excision specimen, however no residual squamous cell carcinoma is identified.    B.  The specimen exhibits a normal-appearing epidermis above solar elastosis and papillary dermal fibrosis.  There is no evidence of malignancy.    C.  The specimen exhibits changes of actinic keratosis, but no features of residual squamous cell carcinoma.    D.  The specimen exhibits changes " of actinic keratosis, but no features of residual squamous cell carcinoma.    E.  The specimen exhibits epidermal hyperplasia and keratinocyte atypia consistent with hypertrophic actinic keratosis (as well as cautery artifact-type changes), but fails to demonstrate definite features of carcinoma in situ or invasive squamous cell carcinoma.    F.  The specimen exhibits epidermal changes of actinic keratosis (without                           evidence of squamous cell carcinoma), above an expanded dermis which contains a population of pleomorphic, atypical spindled, epithelioid and occasionally bizarre cells.  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    G.  The specimen exhibits mild epidermal hyperplasia, solar elastosis and papillary dermal fibrosis.  No residual squamous cell carcinoma is identified.    H.  The specimen exhibits epidermal changes of actinic keratosis and scar from the prior surgical procedure, as well as a proliferation of atypical, pleomorphic spindled cells within the dermis which label with CD10 immunostain and rarely with p63, and are negative on cytokeratin AE1/AE3, cytokeratin 5 and SOX10 immunostains.    I. The specimen exhibits changes of actinic keratosis, but no features of                           residual squamous cell carcinoma or pleomorphic dermal sarcoma.    J.  The specimen exhibits epidermal changes of actinic keratosis, prominent solar elastosis and deep dermal fibrosis and cautery artifact.  There is no evidence of malignancy on multiple level sections or on CD10, p63, cytokeratin AE1/AE3, CK5 or SOX10 immunostains.    K.  The specimen exhibits epidermal changes of actinic keratosis (with no definite features of residual squamous cell carcinoma), above  dermal fibrosis consistent with scar, and an atypical spindle cell proliferation with pleomorphism and hyperchromasia within the deep dermis and subcutis.  Lesional spindled cells label with CD10 and are negative on cytokeratin AE1/AE3, cytokeratin 5, p63 and SOX10 immunostains.  The lesion is present at the lateral and deep inked margins.    L. The specimen exhibits changes of actinic keratosis and scar, but no features of residual squamous cell carcinoma or pleomorphic dermal sarcoma.    Case was reviewed by the                           following:  Intraoperative Resident/Fellow: Seth Bowman MD  Resident Pathologist: Charis Torres MD  A resident or fellow in a training program was involved in the initial review, preparation, and/or interpretation of this case.  I, as the senior physician, attest that I have personally reviewed all specimens and or slides, including the listed special stains, and used them with my medical judgement to determine the final diagnosis.               MCRS 01/21/2025 Yes (A)  N/A Final     Performing Labs 01/21/2025    Final                    Value:The technical component of this testing was completed at [facility].    Stain controls for all stains resulted within this report have been reviewed and show appropriate reactivity.      Office Visit on 01/14/2025   Component Date Value Ref Range Status     WBC Count 01/14/2025 4.5  4.0 - 11.0 10e3/uL Final     RBC Count 01/14/2025 4.91  4.40 - 5.90 10e6/uL Final     Hemoglobin 01/14/2025 15.2  13.3 - 17.7 g/dL Final     Hematocrit 01/14/2025 46.1  40.0 - 53.0 % Final     MCV 01/14/2025 94  78 - 100 fL Final     MCH 01/14/2025 31.0  26.5 - 33.0 pg Final     MCHC 01/14/2025 33.0  31.5 - 36.5 g/dL Final     RDW 01/14/2025 13.8  10.0 - 15.0 % Final     Platelet Count 01/14/2025 128 (L)  150 - 450 10e3/uL Final     Sodium 01/14/2025 142  135 - 145 mmol/L Final     Potassium  01/14/2025 4.3  3.4 - 5.3 mmol/L Final     Chloride 01/14/2025 105  98 - 107 mmol/L Final     Carbon Dioxide (CO2) 01/14/2025 27  22 - 29 mmol/L Final     Anion Gap 01/14/2025 10  7 - 15 mmol/L Final     Urea Nitrogen 01/14/2025 16.6  8.0 - 23.0 mg/dL Final     Creatinine 01/14/2025 1.15  0.67 - 1.17 mg/dL Final     GFR Estimate 01/14/2025 63  >60 mL/min/1.73m2 Final     Calcium 01/14/2025 9.8  8.8 - 10.4 mg/dL Final     Glucose 01/14/2025 86  70 - 99 mg/dL Final   Hospital Outpatient Visit on 11/06/2024   Component Date Value Ref Range Status     Creatinine POCT 11/06/2024 1.3  0.7 - 1.3 mg/dL Final     GFR, ESTIMATED POCT 11/06/2024 55 (L)  >60 mL/min/1.73m2 Final         Total time spent for face to face visit, reviewing labs/imaging studies, counseling and coordination of care was: 30 Minutes spent on the date of the encounter doing chart review, review of outside records, review of test results, interpretation of tests, patient visit, and documentation     The longitudinal plan of care for the diagnosis(es)/condition(s) as documented were addressed during this visit. Due to the added complexity in care, I will continue to support Michael in the subsequent management and with ongoing continuity of care.    This note was dictated using voice recognition software.  Any grammatical or context distortions are unintentional and inherent to the software.    Orders Placed This Encounter   Procedures     Keppra (Levetiracetam) Level     Basic metabolic panel      New Prescriptions    No medications on file     Modified Medications    Modified Medication Previous Medication    LEVETIRACETAM (KEPPRA) 500 MG TABLET levETIRAcetam (KEPPRA) 500 MG tablet       Take 1 tablet (500 mg) by mouth 2 times daily.    Take 1 tablet (500 mg) by mouth 2 times daily                 Again, thank you for allowing me to participate in the care of your patient.        Sincerely,        Carlitos Finch MD    Electronically signed

## 2025-02-05 NOTE — NURSING NOTE
Chief Complaint   Patient presents with    Seizures     Annual follow up- He feels like he is slower than usual but does not have memory concerns      Nilda DIAMOND CMA on 2/5/2025 at 10:04 AM  [facility]

## 2025-02-06 ENCOUNTER — LAB (OUTPATIENT)
Dept: LAB | Facility: CLINIC | Age: 85
End: 2025-02-06
Payer: COMMERCIAL

## 2025-02-06 DIAGNOSIS — G40.209 PARTIAL COMPLEX SEIZURE DISORDER WITHOUT INTRACTABLE EPILEPSY (H): ICD-10-CM

## 2025-02-06 LAB
ANION GAP SERPL CALCULATED.3IONS-SCNC: 12 MMOL/L (ref 7–15)
BUN SERPL-MCNC: 14 MG/DL (ref 8–23)
CALCIUM SERPL-MCNC: 9.5 MG/DL (ref 8.8–10.4)
CHLORIDE SERPL-SCNC: 105 MMOL/L (ref 98–107)
CREAT SERPL-MCNC: 1.14 MG/DL (ref 0.67–1.17)
EGFRCR SERPLBLD CKD-EPI 2021: 64 ML/MIN/1.73M2
GLUCOSE SERPL-MCNC: 104 MG/DL (ref 70–99)
HCO3 SERPL-SCNC: 25 MMOL/L (ref 22–29)
LEVETIRACETAM SERPL-MCNC: 19.2 ÂΜG/ML (ref 10–40)
POTASSIUM SERPL-SCNC: 4.4 MMOL/L (ref 3.4–5.3)
SODIUM SERPL-SCNC: 142 MMOL/L (ref 135–145)

## 2025-02-12 ENCOUNTER — PATIENT OUTREACH (OUTPATIENT)
Dept: CARE COORDINATION | Facility: CLINIC | Age: 85
End: 2025-02-12
Payer: COMMERCIAL

## 2025-02-20 DIAGNOSIS — I10 PRIMARY HYPERTENSION: ICD-10-CM

## 2025-02-20 RX ORDER — AMLODIPINE BESYLATE 5 MG/1
TABLET ORAL
Qty: 90 TABLET | Refills: 1 | Status: SHIPPED | OUTPATIENT
Start: 2025-02-20

## 2025-03-10 ENCOUNTER — OFFICE VISIT (OUTPATIENT)
Dept: OTOLARYNGOLOGY | Facility: CLINIC | Age: 85
End: 2025-03-10
Payer: COMMERCIAL

## 2025-03-10 VITALS — WEIGHT: 199 LBS | HEIGHT: 69 IN | BODY MASS INDEX: 29.47 KG/M2

## 2025-03-10 DIAGNOSIS — C44.42 SQUAMOUS CELL CARCINOMA OF SCALP: Primary | ICD-10-CM

## 2025-03-10 ASSESSMENT — PAIN SCALES - GENERAL: PAINLEVEL_OUTOF10: NO PAIN (0)

## 2025-03-10 NOTE — PATIENT INSTRUCTIONS
You were seen in the ENT Clinic today by Dr. Chaves. If you have any questions or concerns after your appointment, please contact us (see below)    Please return to clinic in 3 months for follow up with Dr. Chaves and PET scan prior     How to Contact Us:  Send a Goko message to your provider. Our team will respond to you via Goko. Occasionally, we will need to call you to get further information.  For urgent matters (Monday-Friday), call the ENT Clinic: [phone] and speak with a call center team member - they will route your call appropriately.   If you'd like to speak directly with a nurse, please find our contact information below. We do our best to check voicemail frequently throughout the day, and will work to call you back within 1-2 days. For urgent matters, please use the general clinic phone numbers listed above.    Jackie TAPIA RN, BSN  RN Care Coordinator, ENT Clinic  [facility]  Direct: [phone]

## 2025-03-10 NOTE — LETTER
3/10/2025       RE: Michael Maldonado  [address]  [place] [address]     Dear Colleague,    Thank you for referring your patient, Michael Maldonado, to the [facility] [place] [facility]. Please see a copy of my visit note below.    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 Initial diagnosis SCC. Final margins clear.      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     We previously had extensive discussions regarding the role of adjuvant radiation therapy for his pleomorphic dermal sarcoma.  They understand this is the typical recommendation.    Due to his other medical comorbidities this would be very high risk for him.  He would have needed free tissue reconstruction of the scalp.  Additionally, for his prior radiation he required general anesthesia for each radiation treatment.  Given his age we all agree that this is not a good option for him and we will continue clinical observation.    I recommended that they schedule follow-up with her dermatologist for routine skin examinations.    I will see him back in 3 months with a postsurgery PET scan.    Lucas Chaves MD     25 minutes spent on the date of the encounter in chart review, patient visit, review of tests, documentation and/or  discussion with other providers about the issues documented above.     The longitudinal plan of care for the diagnosis(es)/condition(s) as documented were addressed during this visit. Due to the added complexity in care, I will continue to support Michael in the subsequent management and with ongoing continuity of care.      Again, thank you for allowing me to participate in the care of your patient.      Sincerely,    Lucas Chaves MD

## 2025-03-10 NOTE — PROGRESS NOTES
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 Initial diagnosis SCC. Final margins clear.      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     We previously had extensive discussions regarding the role of adjuvant radiation therapy for his pleomorphic dermal sarcoma.  They understand this is the typical recommendation.    Due to his other medical comorbidities this would be very high risk for him.  He would have needed free tissue reconstruction of the scalp.  Additionally, for his prior radiation he required general anesthesia for each radiation treatment.  Given his age we all agree that this is not a good option for him and we will continue clinical observation.    I recommended that they schedule follow-up with her dermatologist for routine skin examinations.    I will see him back in 3 months with a postsurgery PET scan.    Lucas Chaves MD     25 minutes spent on the date of the encounter in chart review, patient visit, review of tests, documentation and/or discussion with other providers about the issues documented above.     The longitudinal plan of care for the diagnosis(es)/condition(s) as documented were addressed during this visit. Due to the added complexity in care, I will continue to support Michael in the subsequent management and with ongoing continuity of care.

## 2025-03-23 ENCOUNTER — HEALTH MAINTENANCE LETTER (OUTPATIENT)
Age: 85
End: 2025-03-23

## 2025-04-01 ENCOUNTER — TELEPHONE (OUTPATIENT)
Dept: INTERNAL MEDICINE | Facility: CLINIC | Age: 85
End: 2025-04-01
Payer: COMMERCIAL

## 2025-04-01 NOTE — TELEPHONE ENCOUNTER
Patient Quality Outreach    Patient is due for the following  Physical Annual Wellness Visit    Action(s) Taken:   Patient to call back and schedule an AWV    Type of outreach:    Phone, left message for patient/parent to call back.    Questions for provider review:    None         Joy C Steinert, [facility]  Chart routed to None.

## 2025-05-03 NOTE — PROGRESS NOTES
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  On 2025 his Keppra level was therapeutic and no change was made.  I have recommended:    1.  Repeat CT brain with and without contrast.  He feels claustrophobic even with a CT scan and prescription for Ativan was sent.  2.  Sleep deprived EEG  3.  Increase Keppra to 750 mg twice daily and repeat a level today  4.  Follow-up the day he is scheduled for EEG and depending on his response I will make further adjustment in the dose of Keppra and also check a level.    Thank you again for this referral, please feel free to contact me if you have any questions.    Carlitos Finch MD  [facility] NEUROLOGY, [place]     HISTORY OF PRESENT ILLNESS     Patient is 84-year-old male with history of HTN, HLD, EtOH abuse, hypothyroidism, SNHL, cerebral AV malformation s/p craniotomy , parotid cancer who returns for yearly follow-up for follow-up.  He was last seen on 2025 and was continued on Keppra and was told to return for follow-up in 1 year.  He is here earlier with his daughter-in-law as few months ago he was having episodes  during which he would feel out of it and lose touch with his surrounding.  He denies any tonic-clonic activity and claims he is taking medication regularly.  He has stopped having those episodes.    BRIEFLY patient is a male with history of HTN, HLD, EtOH abuse, SNHL, cerebral AV malformation s/p craniotomy in 1970, parotid gland cancer who was initially seen on 11/15/2022 after an evaluation in the ER for an episode of confusion. According to wife he had acute onset when he was asking questions repetitively initially when he presented to the emergency room he had a CT of the head and CTA that was unremarkable aside from some chronic changes.  Although admission was recommended he wanted to go home.  He was started on aspirin and simvastatin and subsequently had an EEG that was abnormal showing left parasagittal sharp discharges suggesting low threshold for seizure.  30-day event monitor and echocardiogram were normal.  He was started on Keppra.     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    Surgical History:  He  has a past surgical history that includes Craniotomy For Avm (1970); Total Knee Arthroplasty  (Left, 2013); Salivary gland surgery (Right, 2011); joint replacement; TOTAL KNEE ARTHROPLASTY (Right, 1/7/2019); and Excise lesion head (Right, 1/21/2025).     SOCIAL HISTORY     Reviewed, and he  reports that he quit smoking about 25 years ago. His smoking use included cigars. He has never been exposed to tobacco smoke. He has never used smokeless tobacco. He reports current alcohol use of about 1.0 standard drink of alcohol per week. He reports that he does not use drugs.     FAMILY HISTORY     Reviewed, and family history includes Cirrhosis in his father; Diabetes in his father.     ALLERGIES     No Known Allergies      REVIEW OF SYSTEMS     A 12 point review of system was performed and was negative except as outlined in the history of present illness.     HOME MEDICATIONS     Current Outpatient Rx   Medication Sig Dispense Refill    amLODIPine (NORVASC) 5 MG tablet TAKE 1 TABLET BY MOUTH EVERY DAY 90 tablet 1    aspirin (ASA) 81 MG EC tablet Take 1 tablet by mouth every other day      fluorouracil (EFUDEX) 5 % external cream APPLY 1 APPLICATION TOPICALLY TO SCALP AND FOREHEAD      levETIRAcetam (KEPPRA) 750 MG tablet Take 1 tablet (750 mg) by mouth 2 times daily. 60 tablet 11    levothyroxine (SYNTHROID/LEVOTHROID) 88 MCG tablet Take 1 tablet (88 mcg) by mouth daily. 90 tablet 3    LORazepam (ATIVAN) 1 MG tablet Take 1 tablet 30 minutes before MRI scan 1 tablet 0    losartan (COZAAR) 50 MG tablet Take 1 tablet (50 mg) by mouth daily. 90 tablet 3    omeprazole (PRILOSEC) 20 MG DR capsule TAKE 1 CAPSULE BY MOUTH TWICE DAILY BEFORE MEALS 180 capsule 2    simvastatin (ZOCOR) 80 MG tablet TAKE 1/2 TABLET BY MOUTH EVERY DAY 45 tablet 2         PHYSICAL EXAM     Vital signs  /76 (BP Location: Right arm, Patient Position: Sitting, Cuff Size: Adult Regular)   Pulse 69     Weight:   0 lbs 0 oz    Elderly gentleman who is alert and oriented vital signs were reviewed and documented in electronic medical record.   Neck was supple.  He has facial deformity due to previous surgery cranial nerves are intact except for sensorineural hearing loss.  He has normal mass and tone with 5/5 strength reflexes 1+ toes equivocal.  He has a wide-based gait and has trouble tandem walking.  Sensation intact with no double simultaneous extinction     PERTINENT DIAGNOSTIC STUDIES     Following studies were reviewed:     CT BRAIN 5/17/2023  1.  Stable head CT compared with 11/10/2022, without acute intracranial abnormality.  2.  Stable postoperative changes related to prior left parietal craniotomy, with underlying broad encephalomalacia and gliosis and partial resection of the posterior body and splenium of the corpus callosum.  3.  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     CTA HEAD & NECK 11/10/2022  HEAD CT:  1.  No finding for intracranial hemorrhage or mass or convincing finding for acute infarct.     2.  Postsurgical changes relating to prior left parietal craniotomy. Broad area of encephalomalacic change gliosis is seen within the subjacent left frontal lobe and surgical clips are seen in the region of the pineal gland.     3.  There is marked dilatation of the atrium of the left lateral ventricle with an apparent arachnoid cyst bowing the septum pellucidum to the right. There is prominent enlargement of the left temporal horn and occipital horn without enlargement of the   left frontal horn suggesting postobstructive dilatation of the temporal and occipital horns.     4.  If prior studies can be provided for comparison, a comparison will be made and an addendum issued. If no prior studies are available for comparison, consider correlation with MRI for further assessment.     5.  Mild volume loss and presumed sequela chronic microvascular ischemic change.     HEAD  CTA:   1. No finding for vascular cutoff, aneurysm, or flow-limiting stenosis.  2. Coarse atherosclerotic plaque is seen within both carotid siphons without significant associated luminal stenosis.     NECK CTA:  1.  Moderate atherosclerotic plaque right carotid bulb/bifurcation with mild associated luminal narrowing.     2.  Coarse atherosclerotic plaque is seen within the proximal right vertebral artery with more mild atherosclerotic plaque on the left. There is moderate or high-grade narrowing of the proximal right vertebral artery which, where visualized, is similar   to the 1/22/2019 CTA chest.     EEG 11/23/2022  This is an abnormal EEG due to asymmetric slowing of the background that is maximally expressed on the left hemisphere. Asymmetry of the amplitude likely is due to breach effect. Additionally polyspike discharges are noted in the left parasagittal head region with phase reversal at C3 that suggest a low threshold for seizures.      30-DAY EVENT MONITOR 12/28/2022  Cardiac event monitoring from 12/28/2022 to 1/26/2023 (monitored duration 16d 19h 17m).  Baseline rhythm was sinus rhythm.    Reported heart rate range 50 to 120bpm, average 71bpm.  No symptom triggers.  Seven automated recordings included sinus rhythm with 1st degree AV delay and rare PACs and PVCs.  No sustained tachyarrhythmias.  No atrial fibrillation.  There were no pauses noted.  Supraventricular and ventricular ectopic beat frequency are not reported on this monitoring modality.       ECHOCARDIOGRAM 12/21/2022  The left ventricle is normal in size with mild concentric left ventricular  hypertrophy.  Left ventricular function is normal.The ejection fraction is 60-65%.  Normal right ventricle size and systolic function.  A contrast injection (Bubble Study) was performed that was negative for flow  across the interatrial septum.  Mild age-related valve disease is present without significant stenosis or  regurgitation.  There is no  comparison study available.     PERTINENT LABS  Following labs were reviewed:  No visits with results within 3 Month(s) from this visit.   Latest known visit with results is:   Lab on 02/06/2025   Component Date Value Ref Range Status    Keppra (Levetiracetam) Level 02/06/2025 19.2  10.0 - 40.0  g/mL Final    Sodium 02/06/2025 142  135 - 145 mmol/L Final    Potassium 02/06/2025 4.4  3.4 - 5.3 mmol/L Final    Chloride 02/06/2025 105  98 - 107 mmol/L Final    Carbon Dioxide (CO2) 02/06/2025 25  22 - 29 mmol/L Final    Anion Gap 02/06/2025 12  7 - 15 mmol/L Final    Urea Nitrogen 02/06/2025 14.0  8.0 - 23.0 mg/dL Final    Creatinine 02/06/2025 1.14  0.67 - 1.17 mg/dL Final    GFR Estimate 02/06/2025 64  >60 mL/min/1.73m2 Final    Calcium 02/06/2025 9.5  8.8 - 10.4 mg/dL Final    Glucose 02/06/2025 104 (H)  70 - 99 mg/dL Final         Total time spent for face to face visit, reviewing labs/imaging studies, counseling and coordination of care was: 45 Minutes spent on the date of the encounter doing chart review, review of outside records, review of test results, interpretation of tests, patient visit, and documentation     The longitudinal plan of care for the diagnosis(es)/condition(s) as documented were addressed during this visit. Due to the added complexity in care, I will continue to support Michael in the subsequent management and with ongoing continuity of care.    This note was dictated using voice recognition software.  Any grammatical or context distortions are unintentional and inherent to the software.    Orders Placed This Encounter   Procedures    CT Head w/o & w Contrast    Keppra (Levetiracetam) Level    EEG      New Prescriptions    LORAZEPAM (ATIVAN) 1 MG TABLET    Take 1 tablet 30 minutes before MRI scan     Modified Medications    Modified Medication Previous Medication    LEVETIRACETAM (KEPPRA) 750 MG TABLET levETIRAcetam (KEPPRA) 500 MG tablet       Take 1 tablet (750 mg) by mouth 2 times daily.     Take 1 tablet (500 mg) by mouth 2 times daily.

## 2025-05-07 ENCOUNTER — OFFICE VISIT (OUTPATIENT)
Dept: NEUROLOGY | Facility: CLINIC | Age: 85
End: 2025-05-07
Payer: COMMERCIAL

## 2025-05-07 VITALS — SYSTOLIC BLOOD PRESSURE: 137 MMHG | DIASTOLIC BLOOD PRESSURE: 76 MMHG | HEART RATE: 69 BPM

## 2025-05-07 DIAGNOSIS — G93.0 INTRACRANIAL ARACHNOID CYST: ICD-10-CM

## 2025-05-07 DIAGNOSIS — G40.209 PARTIAL COMPLEX SEIZURE DISORDER WITHOUT INTRACTABLE EPILEPSY (H): Primary | ICD-10-CM

## 2025-05-07 PROCEDURE — G2211 COMPLEX E/M VISIT ADD ON: HCPCS | Performed by: PSYCHIATRY & NEUROLOGY

## 2025-05-07 PROCEDURE — 3078F DIAST BP <80 MM HG: CPT | Performed by: PSYCHIATRY & NEUROLOGY

## 2025-05-07 PROCEDURE — 3075F SYST BP GE 130 - 139MM HG: CPT | Performed by: PSYCHIATRY & NEUROLOGY

## 2025-05-07 PROCEDURE — 99215 OFFICE O/P EST HI 40 MIN: CPT | Performed by: PSYCHIATRY & NEUROLOGY

## 2025-05-07 RX ORDER — LEVETIRACETAM 750 MG/1
750 TABLET ORAL 2 TIMES DAILY
Qty: 60 TABLET | Refills: 11 | Status: SHIPPED | OUTPATIENT
Start: 2025-05-07

## 2025-05-07 RX ORDER — LORAZEPAM 1 MG/1
TABLET ORAL
Qty: 1 TABLET | Refills: 0 | Status: SHIPPED | OUTPATIENT
Start: 2025-05-07

## 2025-06-03 ENCOUNTER — PATIENT OUTREACH (OUTPATIENT)
Dept: OTOLARYNGOLOGY | Facility: CLINIC | Age: 85
End: 2025-06-03
Payer: COMMERCIAL

## 2025-06-03 DIAGNOSIS — C44.42 SQUAMOUS CELL CARCINOMA OF SCALP: Primary | ICD-10-CM

## 2025-06-03 NOTE — TELEPHONE ENCOUNTER
Called and left voicemail for patient's son (Memo) regarding request to cancel/change PET scan and he does not believe patient will be able to tolerate this long of a scan. Dr. Chaves confirmed we can change imaging to CT head/neck/chest. Direct callback number left in voicemail.       Jackie Smith, RN, BSN  RN Care Coordinator, ENT Clinic

## 2025-06-30 ENCOUNTER — ANCILLARY PROCEDURE (OUTPATIENT)
Dept: CT IMAGING | Facility: CLINIC | Age: 85
End: 2025-06-30
Attending: STUDENT IN AN ORGANIZED HEALTH CARE EDUCATION/TRAINING PROGRAM
Payer: COMMERCIAL

## 2025-06-30 ENCOUNTER — OFFICE VISIT (OUTPATIENT)
Dept: OTOLARYNGOLOGY | Facility: CLINIC | Age: 85
End: 2025-06-30
Payer: COMMERCIAL

## 2025-06-30 VITALS
BODY MASS INDEX: 30.67 KG/M2 | HEIGHT: 69 IN | OXYGEN SATURATION: 97 % | WEIGHT: 207.1 LBS | HEART RATE: 69 BPM | SYSTOLIC BLOOD PRESSURE: 138 MMHG | DIASTOLIC BLOOD PRESSURE: 79 MMHG

## 2025-06-30 DIAGNOSIS — C44.42 SQUAMOUS CELL CARCINOMA OF SCALP: ICD-10-CM

## 2025-06-30 DIAGNOSIS — C44.42 SQUAMOUS CELL CARCINOMA OF SCALP: Primary | ICD-10-CM

## 2025-06-30 LAB
CREAT BLD-MCNC: 1.3 MG/DL (ref 0.7–1.2)
EGFRCR SERPLBLD CKD-EPI 2021: 55 ML/MIN/1.73M2

## 2025-06-30 PROCEDURE — 88305 TISSUE EXAM BY PATHOLOGIST: CPT | Mod: TC | Performed by: STUDENT IN AN ORGANIZED HEALTH CARE EDUCATION/TRAINING PROGRAM

## 2025-06-30 PROCEDURE — 70470 CT HEAD/BRAIN W/O & W/DYE: CPT | Performed by: RADIOLOGY

## 2025-06-30 PROCEDURE — 70491 CT SOFT TISSUE NECK W/DYE: CPT | Performed by: RADIOLOGY

## 2025-06-30 PROCEDURE — 88305 TISSUE EXAM BY PATHOLOGIST: CPT | Mod: 26 | Performed by: STUDENT IN AN ORGANIZED HEALTH CARE EDUCATION/TRAINING PROGRAM

## 2025-06-30 PROCEDURE — 82565 ASSAY OF CREATININE: CPT | Performed by: PATHOLOGY

## 2025-06-30 PROCEDURE — 71260 CT THORAX DX C+: CPT | Performed by: RADIOLOGY

## 2025-06-30 RX ORDER — IOPAMIDOL 755 MG/ML
97 INJECTION, SOLUTION INTRAVASCULAR ONCE
Status: COMPLETED | OUTPATIENT
Start: 2025-06-30 | End: 2025-06-30

## 2025-06-30 RX ADMIN — IOPAMIDOL 97 ML: 755 INJECTION, SOLUTION INTRAVASCULAR at 14:23

## 2025-06-30 ASSESSMENT — PAIN SCALES - GENERAL: PAINLEVEL_OUTOF10: NO PAIN (0)

## 2025-06-30 NOTE — LETTER
6/30/2025       RE: Michael Maldonado  [address]  [address]  [place] [address]     Dear Colleague,    Thank you for referring your patient, Michael Maldonado, to the [facility] [place] at [facility]. Please see a copy of my visit note below.    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 Initial diagnosis SCC. Final margins clear.      3) Right parotid carcinoma  --surgery and XRT at [facility]    Imaging:  3 month post treatment pet/ct 6/30/25: TRIXIE     Interval history: He has no complaints today     Physical examination:  Alert in no acute distress  Prior skin resections are well healed.  He has two new suspicious areas on the scalp. Right frontal and right vertex. Each about 1 cm and mobile.  Right parotidectomy defect from prior surgery  No adenopathy    Procedure: Punch biopsy was performed to both\ skin lesions.  1% lidocaine with 1: 100,000 epinephrine was used for local anesthesia.  3 mm punch was used on both sites to obtain specimens.  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     His recent PET scan is clear of disease.  On examination he has 2 suspicious areas on the scalp that were biopsied today.    Anticipate he will need surgery for these    He needs to continue seeing dermatology for full body skin exams and they will arrange this.     Lucas Chaves MD     25 minutes spent on the date of the encounter in chart review, patient visit, review of tests, documentation and/or discussion with other providers  about the issues documented above aside from time spent doing skin biopsy.       Again, thank you for allowing me to participate in the care of your patient.      Sincerely,    Lucas Chaves MD

## 2025-06-30 NOTE — DISCHARGE INSTRUCTIONS

## 2025-06-30 NOTE — DISCHARGE INSTRUCTIONS

## 2025-06-30 NOTE — PATIENT INSTRUCTIONS
1. We will call you with pathology results.   2. Please call the ENT clinic with any questions,concerns, new or worsening symptoms.    -Clinic number is [phone]

## 2025-07-01 ENCOUNTER — PATIENT OUTREACH (OUTPATIENT)
Dept: CARE COORDINATION | Facility: CLINIC | Age: 85
End: 2025-07-01
Payer: COMMERCIAL

## 2025-07-02 ENCOUNTER — PREP FOR PROCEDURE (OUTPATIENT)
Dept: OTOLARYNGOLOGY | Facility: CLINIC | Age: 85
End: 2025-07-02
Payer: COMMERCIAL

## 2025-07-02 ENCOUNTER — PATIENT OUTREACH (OUTPATIENT)
Dept: OTOLARYNGOLOGY | Facility: CLINIC | Age: 85
End: 2025-07-02
Payer: COMMERCIAL

## 2025-07-02 DIAGNOSIS — C44.42 SQUAMOUS CELL CARCINOMA OF SCALP: Primary | ICD-10-CM

## 2025-07-02 LAB
PATH REPORT.COMMENTS IMP SPEC: ABNORMAL
PATH REPORT.COMMENTS IMP SPEC: ABNORMAL
PATH REPORT.COMMENTS IMP SPEC: YES
PATH REPORT.FINAL DX SPEC: ABNORMAL
PATH REPORT.GROSS SPEC: ABNORMAL
PATH REPORT.MICROSCOPIC SPEC OTHER STN: ABNORMAL
PATH REPORT.RELEVANT HX SPEC: ABNORMAL
PHOTO IMAGE: ABNORMAL

## 2025-07-02 NOTE — TELEPHONE ENCOUNTER
Called and spoke with patient's son regarding the following CT neck and chest results:  Impression: Right parotidectomy changes with no evidence of  recurrence. No cervical lymphadenopathy.    IMPRESSION: No evidence metastatic disease in the chest.    IMPRESSION:  Prior surgical changes in the left parietotemporal region.  Unchanged asymmetric enlargement of left lateral ventricle atrium is  likely due to intraventricular cystic structure. No abnormal enhancing  lesions.     No concerns seen on imaging.     Reviewed the following biopsy results with patient's son:     Final Diagnosis   A. SKIN, VERTEX SCALP:  - INVASIVE SQUAMOUS CELL CARCINOMA, WELL DIFFERENTIATED  - Extending to tissue edges     B. SKIN, ANTERIOR SCALP:  - INVASIVE SQUAMOUS CELL CARCINOMA, WELL DIFFERENTIATED  - Extending to tissue edges        Reviewed that Dr. Chaves would like to plan for an excision of these 2 areas in the operating room. Reviewed general pre-op and post op care with patient's son. Advised that patient will require a  for day of procedure as well a pre-op physical with PCP within 30 days.     Surgery schedulers will call son to arrange date/ time of procedure. He verbalized understanding and will await call to arrange.     Asuncion Cameron, RN, BSN

## 2025-07-03 ENCOUNTER — TELEPHONE (OUTPATIENT)
Dept: OTOLARYNGOLOGY | Facility: CLINIC | Age: 85
End: 2025-07-03
Payer: COMMERCIAL

## 2025-07-03 DIAGNOSIS — I10 PRIMARY HYPERTENSION: ICD-10-CM

## 2025-07-03 RX ORDER — AMLODIPINE BESYLATE 5 MG/1
5 TABLET ORAL DAILY
Qty: 90 TABLET | Refills: 3 | Status: SHIPPED | OUTPATIENT
Start: 2025-07-03

## 2025-07-03 RX ORDER — AMLODIPINE BESYLATE 5 MG/1
5 TABLET ORAL DAILY
Qty: 90 TABLET | Refills: 1 | OUTPATIENT
Start: 2025-07-03

## 2025-07-03 NOTE — TELEPHONE ENCOUNTER
Called patient and patients spouse regarding scheduling surgery with Dr. Chaves. Per patient and spouse, they cannot hear or understand writer. They want a call back in 30-60 minutes.     Cheryle Rao on 7/3/2025 at 11:28 AM

## 2025-07-03 NOTE — PROGRESS NOTES
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 Initial diagnosis SCC. Final margins clear.      3) Right parotid carcinoma  --surgery and XRT at [facility]    Imaging:  3 month post treatment pet/ct 6/30/25: TRIXIE     Interval history: He has no complaints today     Physical examination:  Alert in no acute distress  Prior skin resections are well healed.  He has two new suspicious areas on the scalp. Right frontal and right vertex. Each about 1 cm and mobile.  Right parotidectomy defect from prior surgery  No adenopathy    Procedure: Punch biopsy was performed to both\ skin lesions.  1% lidocaine with 1: 100,000 epinephrine was used for local anesthesia.  3 mm punch was used on both sites to obtain specimens.  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     His recent PET scan is clear of disease.  On examination he has 2 suspicious areas on the scalp that were biopsied today.    Anticipate he will need surgery for these    He needs to continue seeing dermatology for full body skin exams and they will arrange this.     Lucas Chaves MD     25 minutes spent on the date of the encounter in chart review, patient visit, review of tests, documentation and/or discussion with other providers about the issues documented above aside from time spent doing skin biopsy.

## 2025-07-03 NOTE — TELEPHONE ENCOUNTER
Pt calling for refills. Can't fill at pharmacy.  Refill on file at pharmacy. Pt states they don't have anything for him.   Pharmacist contacted- rx that we have on file has been closed on pharmacy end.     Please send new rx to [facility] pharmacy. Order pending.

## 2025-07-03 NOTE — TELEPHONE ENCOUNTER
Called patient back and spoke with him regarding his upcoming surgery date.     Spoke with: Michael (patient)     Date of Surgery: 7/17/2025 - pt states he would talk to his [place], [place] and confirm with him     Estimated Arrival time Discussed with Patient:  Yes ~ 9am - 10am     Location of surgery: [facility]     Pre-Op H&P: PCP - Patients son is aware of this     Post-Op Appt Date w/ Surgeon:  Dr. Chaves     Discussed with patient pre-op RN will call 2-3 days prior to surgery with arrival time and instructions:  Yes       Standard Surgery Packet Sent: Yes to be sent Mail - Standard      Additional Comments: All patients questions were answered. Direct number provided. He states he will call back next week to confirm, [phone]      Cheryle Rao on 7/3/2025 at 2:23 PM

## 2025-07-09 ENCOUNTER — TELEPHONE (OUTPATIENT)
Dept: NEUROLOGY | Facility: CLINIC | Age: 85
End: 2025-07-09
Payer: COMMERCIAL

## 2025-07-09 NOTE — TELEPHONE ENCOUNTER
7/9//25 @3:10 called and spoke with patient and spouse in regards to rescheduling their EEG appointment along with F.U with Dr. Finch.They both don't recall making this appointment after explaining appointment and what it is for pt still doesn't want to res. They both were having a hard time understanding what was going on and I ask patient if there would be another person best to speak to get Pt. Rescheduled. Pt did mentioned daughter but we dont have a c2c on file to speak with the daughter. Pt got upset and hung up that he doesn't care .

## 2025-07-29 ENCOUNTER — PATIENT OUTREACH (OUTPATIENT)
Dept: CARE COORDINATION | Facility: CLINIC | Age: 85
End: 2025-07-29
Payer: COMMERCIAL

## 2025-08-25 ENCOUNTER — TRANSFERRED RECORDS (OUTPATIENT)
Dept: HEALTH INFORMATION MANAGEMENT | Facility: CLINIC | Age: 85
End: 2025-08-25
Payer: COMMERCIAL

## 2025-08-26 ENCOUNTER — LAB (OUTPATIENT)
Dept: LAB | Facility: CLINIC | Age: 85
End: 2025-08-26
Payer: COMMERCIAL

## 2025-08-26 ENCOUNTER — OFFICE VISIT (OUTPATIENT)
Dept: SURGERY | Facility: CLINIC | Age: 85
End: 2025-08-26
Payer: COMMERCIAL

## 2025-08-26 ENCOUNTER — PRE VISIT (OUTPATIENT)
Dept: SURGERY | Facility: CLINIC | Age: 85
End: 2025-08-26

## 2025-08-26 VITALS
HEART RATE: 72 BPM | OXYGEN SATURATION: 97 % | DIASTOLIC BLOOD PRESSURE: 83 MMHG | SYSTOLIC BLOOD PRESSURE: 126 MMHG | HEIGHT: 69 IN | TEMPERATURE: 98.1 F | WEIGHT: 204 LBS | BODY MASS INDEX: 30.21 KG/M2

## 2025-08-26 DIAGNOSIS — Z01.818 PREOP EXAMINATION: ICD-10-CM

## 2025-08-26 DIAGNOSIS — G40.209 PARTIAL COMPLEX SEIZURE DISORDER WITHOUT INTRACTABLE EPILEPSY (H): ICD-10-CM

## 2025-08-26 DIAGNOSIS — C44.42 SQUAMOUS CELL CARCINOMA OF SCALP: ICD-10-CM

## 2025-08-26 DIAGNOSIS — Z01.818 PREOP EXAMINATION: Primary | ICD-10-CM

## 2025-08-26 LAB
ANION GAP SERPL CALCULATED.3IONS-SCNC: 12 MMOL/L (ref 7–15)
BUN SERPL-MCNC: 12.9 MG/DL (ref 8–23)
CALCIUM SERPL-MCNC: 9.3 MG/DL (ref 8.8–10.4)
CHLORIDE SERPL-SCNC: 105 MMOL/L (ref 98–107)
CREAT SERPL-MCNC: 1.18 MG/DL (ref 0.67–1.17)
EGFRCR SERPLBLD CKD-EPI 2021: 61 ML/MIN/1.73M2
ERYTHROCYTE [DISTWIDTH] IN BLOOD BY AUTOMATED COUNT: 14 % (ref 10–15)
GLUCOSE SERPL-MCNC: 109 MG/DL (ref 70–99)
HCO3 SERPL-SCNC: 24 MMOL/L (ref 22–29)
HCT VFR BLD AUTO: 45.7 % (ref 40–53)
HGB BLD-MCNC: 15.1 G/DL (ref 13.3–17.7)
LEVETIRACETAM SERPL-MCNC: 7.1 ÂΜG/ML (ref 10–40)
MCH RBC QN AUTO: 30.1 PG (ref 26.5–33)
MCHC RBC AUTO-ENTMCNC: 33 G/DL (ref 31.5–36.5)
MCV RBC AUTO: 91 FL (ref 78–100)
PLATELET # BLD AUTO: 139 10E3/UL (ref 150–450)
POTASSIUM SERPL-SCNC: 4.1 MMOL/L (ref 3.4–5.3)
RBC # BLD AUTO: 5.02 10E6/UL (ref 4.4–5.9)
SODIUM SERPL-SCNC: 141 MMOL/L (ref 135–145)
WBC # BLD AUTO: 3.98 10E3/UL (ref 4–11)

## 2025-08-26 PROCEDURE — 99203 OFFICE O/P NEW LOW 30 MIN: CPT | Performed by: CLINICAL NURSE SPECIALIST

## 2025-08-26 ASSESSMENT — ENCOUNTER SYMPTOMS: SEIZURES: 1

## 2025-08-26 ASSESSMENT — PAIN SCALES - GENERAL: PAINLEVEL_OUTOF10: NO PAIN (0)

## 2025-08-26 ASSESSMENT — LIFESTYLE VARIABLES: TOBACCO_USE: 1

## 2031-04-18 ENCOUNTER — RECORDS - HEALTHEAST (OUTPATIENT)
Dept: ADMINISTRATIVE | Facility: OTHER | Age: OVER 89
End: 2031-04-18

## (undated) DEVICE — DRSG TELFA 3X8" 1238

## (undated) DEVICE — SU MONOCRYL 3-0 SH 27" UND Y416H

## (undated) DEVICE — LINEN TOWEL PACK X6 WHITE 5487

## (undated) DEVICE — SUCTION MANIFOLD NEPTUNE 2 SYS 4 PORT 0702-020-000

## (undated) DEVICE — SPONGE KITTNER 30-101

## (undated) DEVICE — GLOVE BIOGEL PI MICRO SZ 7.5 48575

## (undated) DEVICE — SU ETHILON 3-0 PS-1 18" 1663H

## (undated) DEVICE — CLIP HORIZON MED BLUE 002200

## (undated) DEVICE — PREP POVIDONE-IODINE 10% SOLUTION 4OZ BOTTLE MDS093944

## (undated) DEVICE — LINEN TOWEL PACK X30 5481

## (undated) DEVICE — BUR STRK ROUND 6.3X21.8MM FAST CUT 8 FLUTE 1608-006-143

## (undated) DEVICE — SOL NACL 0.9% IRRIG 1000ML BOTTLE 2F7124

## (undated) DEVICE — SU SILK 3-0 TIE 12X30" A304H

## (undated) DEVICE — SU SILK 2-0 SH CR 8X18" C012D

## (undated) DEVICE — SU SILK 4-0 TIE 12X30" A303H

## (undated) DEVICE — PREP POVIDONE-IODINE 7.5% SCRUB 4OZ BOTTLE MDS093945

## (undated) DEVICE — DRAPE SHEET REV FOLD 3/4 9349

## (undated) DEVICE — LABEL MEDICATION SYSTEM 3303-P

## (undated) DEVICE — ESU GROUND PAD ADULT W/CORD E7507

## (undated) DEVICE — SU SILK 2-0 TIE 12X30" A305H

## (undated) DEVICE — DRAPE POUCH IRR 1016

## (undated) DEVICE — PACK NEURO MINOR UMMC SNE32MNMU4

## (undated) DEVICE — BLADE KNIFE SURG 15 371115

## (undated) DEVICE — SPONGE LAP 18X18" X8435

## (undated) DEVICE — CLIP HORIZON SM RED WIDE SLOT 001201

## (undated) DEVICE — PREP SKIN SCRUB TRAY 4461A

## (undated) DEVICE — DRSG XEROFORM 5X9" CUR253590W

## (undated) RX ORDER — FENTANYL CITRATE 50 UG/ML
INJECTION, SOLUTION INTRAMUSCULAR; INTRAVENOUS
Status: DISPENSED
Start: 2025-01-21

## (undated) RX ORDER — LIDOCAINE HYDROCHLORIDE AND EPINEPHRINE 10; 10 MG/ML; UG/ML
INJECTION, SOLUTION INFILTRATION; PERINEURAL
Status: DISPENSED
Start: 2025-01-21

## (undated) RX ORDER — FENTANYL CITRATE-0.9 % NACL/PF 10 MCG/ML
PLASTIC BAG, INJECTION (ML) INTRAVENOUS
Status: DISPENSED
Start: 2025-01-21

## (undated) RX ORDER — DEXAMETHASONE SODIUM PHOSPHATE 4 MG/ML
INJECTION, SOLUTION INTRA-ARTICULAR; INTRALESIONAL; INTRAMUSCULAR; INTRAVENOUS; SOFT TISSUE
Status: DISPENSED
Start: 2025-01-21

## (undated) RX ORDER — ONDANSETRON 2 MG/ML
INJECTION INTRAMUSCULAR; INTRAVENOUS
Status: DISPENSED
Start: 2025-01-21

## (undated) RX ORDER — CEFAZOLIN SODIUM/WATER 2 G/20 ML
SYRINGE (ML) INTRAVENOUS
Status: DISPENSED
Start: 2025-01-21

## (undated) RX ORDER — ACETAMINOPHEN 325 MG/1
TABLET ORAL
Status: DISPENSED
Start: 2025-01-21